# Patient Record
Sex: FEMALE | Race: WHITE | Employment: FULL TIME | URBAN - METROPOLITAN AREA
[De-identification: names, ages, dates, MRNs, and addresses within clinical notes are randomized per-mention and may not be internally consistent; named-entity substitution may affect disease eponyms.]

---

## 2017-04-26 PROBLEM — E66.01 MORBID OBESITY (HCC): Status: ACTIVE | Noted: 2017-04-26

## 2017-04-26 PROBLEM — M54.40 ACUTE LEFT-SIDED LOW BACK PAIN WITH SCIATICA: Status: ACTIVE | Noted: 2017-04-26

## 2017-04-27 PROBLEM — E55.9 VITAMIN D DEFICIENCY: Status: ACTIVE | Noted: 2017-04-27

## 2017-04-27 PROBLEM — E78.2 MIXED HYPERLIPIDEMIA: Status: ACTIVE | Noted: 2017-04-27

## 2017-08-29 PROBLEM — E16.1 HYPERINSULINEMIA: Status: ACTIVE | Noted: 2017-08-29

## 2018-03-22 ENCOUNTER — TELEPHONE (OUTPATIENT)
Dept: FAMILY MEDICINE CLINIC | Age: 38
End: 2018-03-22

## 2018-03-23 DIAGNOSIS — E16.1 HYPERINSULINEMIA: ICD-10-CM

## 2018-05-22 ENCOUNTER — OFFICE VISIT (OUTPATIENT)
Dept: FAMILY MEDICINE CLINIC | Age: 38
End: 2018-05-22
Payer: COMMERCIAL

## 2018-05-22 VITALS
TEMPERATURE: 98.5 F | HEART RATE: 68 BPM | HEIGHT: 72 IN | DIASTOLIC BLOOD PRESSURE: 84 MMHG | WEIGHT: 293 LBS | OXYGEN SATURATION: 99 % | SYSTOLIC BLOOD PRESSURE: 128 MMHG | BODY MASS INDEX: 39.68 KG/M2 | RESPIRATION RATE: 16 BRPM

## 2018-05-22 DIAGNOSIS — M79.89 LEG SWELLING: ICD-10-CM

## 2018-05-22 DIAGNOSIS — F32.A DEPRESSION, UNSPECIFIED DEPRESSION TYPE: Primary | ICD-10-CM

## 2018-05-22 DIAGNOSIS — M54.42 ACUTE LEFT-SIDED LOW BACK PAIN WITH LEFT-SIDED SCIATICA: ICD-10-CM

## 2018-05-22 DIAGNOSIS — F41.9 ANXIETY: ICD-10-CM

## 2018-05-22 PROCEDURE — G8427 DOCREV CUR MEDS BY ELIG CLIN: HCPCS | Performed by: NURSE PRACTITIONER

## 2018-05-22 PROCEDURE — 4004F PT TOBACCO SCREEN RCVD TLK: CPT | Performed by: NURSE PRACTITIONER

## 2018-05-22 PROCEDURE — 99213 OFFICE O/P EST LOW 20 MIN: CPT | Performed by: NURSE PRACTITIONER

## 2018-05-22 PROCEDURE — G8417 CALC BMI ABV UP PARAM F/U: HCPCS | Performed by: NURSE PRACTITIONER

## 2018-05-22 RX ORDER — BUSPIRONE HYDROCHLORIDE 5 MG/1
5 TABLET ORAL 3 TIMES DAILY PRN
Qty: 90 TABLET | Refills: 0 | Status: SHIPPED | OUTPATIENT
Start: 2018-05-22 | End: 2018-07-25 | Stop reason: SDUPTHER

## 2018-05-22 RX ORDER — FLUOXETINE HYDROCHLORIDE 20 MG/1
20 CAPSULE ORAL DAILY
COMMUNITY
End: 2018-05-22 | Stop reason: DRUGHIGH

## 2018-05-22 RX ORDER — FLUOXETINE HYDROCHLORIDE 40 MG/1
40 CAPSULE ORAL DAILY
Qty: 30 CAPSULE | Refills: 0 | Status: SHIPPED | OUTPATIENT
Start: 2018-05-22 | End: 2018-06-20 | Stop reason: SDUPTHER

## 2018-05-22 ASSESSMENT — ENCOUNTER SYMPTOMS
COUGH: 0
NAUSEA: 0
CONSTIPATION: 0
WHEEZING: 0
DIARRHEA: 0
VOMITING: 0
SHORTNESS OF BREATH: 0

## 2018-05-22 ASSESSMENT — PATIENT HEALTH QUESTIONNAIRE - PHQ9
SUM OF ALL RESPONSES TO PHQ QUESTIONS 1-9: 1
2. FEELING DOWN, DEPRESSED OR HOPELESS: 1
SUM OF ALL RESPONSES TO PHQ9 QUESTIONS 1 & 2: 1
1. LITTLE INTEREST OR PLEASURE IN DOING THINGS: 0

## 2018-07-25 DIAGNOSIS — F41.9 ANXIETY: ICD-10-CM

## 2018-07-26 RX ORDER — BUSPIRONE HYDROCHLORIDE 5 MG/1
TABLET ORAL
Qty: 90 TABLET | Refills: 0 | Status: SHIPPED | OUTPATIENT
Start: 2018-07-26 | End: 2018-09-06 | Stop reason: SDUPTHER

## 2018-09-06 DIAGNOSIS — F41.9 ANXIETY: ICD-10-CM

## 2018-09-07 RX ORDER — BUSPIRONE HYDROCHLORIDE 5 MG/1
TABLET ORAL
Qty: 90 TABLET | Refills: 0 | Status: SHIPPED | OUTPATIENT
Start: 2018-09-07 | End: 2018-11-14 | Stop reason: SDUPTHER

## 2018-11-06 DIAGNOSIS — F32.A DEPRESSION, UNSPECIFIED DEPRESSION TYPE: ICD-10-CM

## 2018-11-06 RX ORDER — FLUOXETINE HYDROCHLORIDE 40 MG/1
40 CAPSULE ORAL DAILY
Qty: 30 CAPSULE | Refills: 0 | Status: SHIPPED | OUTPATIENT
Start: 2018-11-06 | End: 2018-12-18 | Stop reason: SDUPTHER

## 2018-11-14 DIAGNOSIS — F41.9 ANXIETY: ICD-10-CM

## 2018-11-14 RX ORDER — BUSPIRONE HYDROCHLORIDE 5 MG/1
TABLET ORAL
Qty: 90 TABLET | Refills: 0 | Status: SHIPPED | OUTPATIENT
Start: 2018-11-14 | End: 2018-12-18 | Stop reason: SDUPTHER

## 2018-12-18 DIAGNOSIS — F41.9 ANXIETY: ICD-10-CM

## 2018-12-18 DIAGNOSIS — F32.A DEPRESSION, UNSPECIFIED DEPRESSION TYPE: ICD-10-CM

## 2018-12-18 RX ORDER — FLUOXETINE HYDROCHLORIDE 40 MG/1
40 CAPSULE ORAL DAILY
Qty: 30 CAPSULE | Refills: 0 | Status: SHIPPED | OUTPATIENT
Start: 2018-12-18 | End: 2019-01-16 | Stop reason: SDUPTHER

## 2018-12-18 RX ORDER — BUSPIRONE HYDROCHLORIDE 15 MG/1
TABLET ORAL
Qty: 30 TABLET | Refills: 0 | Status: SHIPPED | OUTPATIENT
Start: 2018-12-18 | End: 2019-01-14 | Stop reason: SDUPTHER

## 2018-12-19 ENCOUNTER — OFFICE VISIT (OUTPATIENT)
Dept: FAMILY MEDICINE CLINIC | Age: 38
End: 2018-12-19
Payer: COMMERCIAL

## 2018-12-19 VITALS
OXYGEN SATURATION: 99 % | HEART RATE: 81 BPM | BODY MASS INDEX: 39.68 KG/M2 | WEIGHT: 293 LBS | SYSTOLIC BLOOD PRESSURE: 126 MMHG | DIASTOLIC BLOOD PRESSURE: 74 MMHG | HEIGHT: 72 IN | RESPIRATION RATE: 16 BRPM | TEMPERATURE: 98 F

## 2018-12-19 DIAGNOSIS — B02.9 HERPES ZOSTER WITHOUT COMPLICATION: Primary | ICD-10-CM

## 2018-12-19 DIAGNOSIS — L30.9 ECZEMA, UNSPECIFIED TYPE: ICD-10-CM

## 2018-12-19 DIAGNOSIS — B36.9 FUNGAL DERMATITIS: ICD-10-CM

## 2018-12-19 PROCEDURE — G8484 FLU IMMUNIZE NO ADMIN: HCPCS | Performed by: NURSE PRACTITIONER

## 2018-12-19 PROCEDURE — G8417 CALC BMI ABV UP PARAM F/U: HCPCS | Performed by: NURSE PRACTITIONER

## 2018-12-19 PROCEDURE — 4004F PT TOBACCO SCREEN RCVD TLK: CPT | Performed by: NURSE PRACTITIONER

## 2018-12-19 PROCEDURE — G8427 DOCREV CUR MEDS BY ELIG CLIN: HCPCS | Performed by: NURSE PRACTITIONER

## 2018-12-19 PROCEDURE — 99214 OFFICE O/P EST MOD 30 MIN: CPT | Performed by: NURSE PRACTITIONER

## 2018-12-19 RX ORDER — METHYLPREDNISOLONE 4 MG/1
TABLET ORAL
Qty: 1 KIT | Refills: 0 | Status: SHIPPED | OUTPATIENT
Start: 2018-12-19 | End: 2019-04-23

## 2018-12-19 RX ORDER — KETOCONAZOLE 20 MG/G
CREAM TOPICAL
Qty: 30 G | Refills: 1 | Status: SHIPPED
Start: 2018-12-19 | End: 2020-03-10

## 2018-12-19 ASSESSMENT — ENCOUNTER SYMPTOMS
DIARRHEA: 0
SHORTNESS OF BREATH: 0
WHEEZING: 0
CONSTIPATION: 0
VOMITING: 0
NAUSEA: 0
COUGH: 0

## 2018-12-21 DIAGNOSIS — E16.1 HYPERINSULINEMIA: ICD-10-CM

## 2019-01-14 DIAGNOSIS — F41.9 ANXIETY: ICD-10-CM

## 2019-01-15 RX ORDER — BUSPIRONE HYDROCHLORIDE 15 MG/1
TABLET ORAL
Qty: 30 TABLET | Refills: 2 | Status: SHIPPED | OUTPATIENT
Start: 2019-01-15 | End: 2019-04-23 | Stop reason: SDUPTHER

## 2019-01-16 DIAGNOSIS — F32.A DEPRESSION, UNSPECIFIED DEPRESSION TYPE: ICD-10-CM

## 2019-01-17 RX ORDER — FLUOXETINE HYDROCHLORIDE 40 MG/1
40 CAPSULE ORAL DAILY
Qty: 30 CAPSULE | Refills: 0 | Status: SHIPPED | OUTPATIENT
Start: 2019-01-17 | End: 2019-02-19 | Stop reason: SDUPTHER

## 2019-02-06 ENCOUNTER — TELEPHONE (OUTPATIENT)
Dept: FAMILY MEDICINE CLINIC | Age: 39
End: 2019-02-06

## 2019-02-06 RX ORDER — FLUCONAZOLE 150 MG/1
150 TABLET ORAL ONCE
Qty: 1 TABLET | Refills: 1 | Status: SHIPPED | OUTPATIENT
Start: 2019-02-06 | End: 2019-02-06

## 2019-02-19 DIAGNOSIS — F32.A DEPRESSION, UNSPECIFIED DEPRESSION TYPE: ICD-10-CM

## 2019-02-19 RX ORDER — FLUOXETINE HYDROCHLORIDE 40 MG/1
40 CAPSULE ORAL DAILY
Qty: 30 CAPSULE | Refills: 2 | Status: SHIPPED | OUTPATIENT
Start: 2019-02-19 | End: 2019-07-05 | Stop reason: SDUPTHER

## 2019-04-23 ENCOUNTER — OFFICE VISIT (OUTPATIENT)
Dept: FAMILY MEDICINE CLINIC | Age: 39
End: 2019-04-23
Payer: COMMERCIAL

## 2019-04-23 VITALS
HEIGHT: 72 IN | HEART RATE: 71 BPM | SYSTOLIC BLOOD PRESSURE: 134 MMHG | DIASTOLIC BLOOD PRESSURE: 82 MMHG | TEMPERATURE: 97.9 F | OXYGEN SATURATION: 99 % | RESPIRATION RATE: 16 BRPM | WEIGHT: 293 LBS | BODY MASS INDEX: 39.68 KG/M2

## 2019-04-23 DIAGNOSIS — B02.9 HERPES ZOSTER WITHOUT COMPLICATION: Primary | ICD-10-CM

## 2019-04-23 DIAGNOSIS — F41.9 ANXIETY: ICD-10-CM

## 2019-04-23 PROCEDURE — 99213 OFFICE O/P EST LOW 20 MIN: CPT | Performed by: FAMILY MEDICINE

## 2019-04-23 PROCEDURE — G8417 CALC BMI ABV UP PARAM F/U: HCPCS | Performed by: FAMILY MEDICINE

## 2019-04-23 PROCEDURE — G8427 DOCREV CUR MEDS BY ELIG CLIN: HCPCS | Performed by: FAMILY MEDICINE

## 2019-04-23 PROCEDURE — 4004F PT TOBACCO SCREEN RCVD TLK: CPT | Performed by: FAMILY MEDICINE

## 2019-04-23 RX ORDER — PREDNISONE 5 MG/1
TABLET ORAL
Qty: 21 EACH | Refills: 0 | Status: SHIPPED | OUTPATIENT
Start: 2019-04-23 | End: 2019-05-14

## 2019-04-23 RX ORDER — BUSPIRONE HYDROCHLORIDE 15 MG/1
TABLET ORAL
Qty: 30 TABLET | Refills: 2 | Status: SHIPPED | OUTPATIENT
Start: 2019-04-23 | End: 2019-08-02 | Stop reason: SDUPTHER

## 2019-04-23 ASSESSMENT — ENCOUNTER SYMPTOMS
APNEA: 0
CHEST TIGHTNESS: 0
EYE ITCHING: 0
BACK PAIN: 0
EYE DISCHARGE: 0
EYE PAIN: 0

## 2019-04-23 ASSESSMENT — PATIENT HEALTH QUESTIONNAIRE - PHQ9
SUM OF ALL RESPONSES TO PHQ QUESTIONS 1-9: 0
2. FEELING DOWN, DEPRESSED OR HOPELESS: 0
1. LITTLE INTEREST OR PLEASURE IN DOING THINGS: 0
SUM OF ALL RESPONSES TO PHQ9 QUESTIONS 1 & 2: 0
SUM OF ALL RESPONSES TO PHQ QUESTIONS 1-9: 0

## 2019-05-14 ENCOUNTER — HOSPITAL ENCOUNTER (OUTPATIENT)
Age: 39
Discharge: HOME | End: 2019-05-16
Payer: COMMERCIAL

## 2019-05-14 ENCOUNTER — OFFICE VISIT (OUTPATIENT)
Dept: FAMILY MEDICINE CLINIC | Age: 39
End: 2019-05-14
Payer: COMMERCIAL

## 2019-05-14 VITALS
HEIGHT: 72 IN | HEART RATE: 68 BPM | BODY MASS INDEX: 39.68 KG/M2 | TEMPERATURE: 97.8 F | SYSTOLIC BLOOD PRESSURE: 138 MMHG | DIASTOLIC BLOOD PRESSURE: 82 MMHG | WEIGHT: 293 LBS | RESPIRATION RATE: 14 BRPM

## 2019-05-14 DIAGNOSIS — R79.89 ABNORMAL THYROID STIMULATING HORMONE LEVEL: Primary | ICD-10-CM

## 2019-05-14 DIAGNOSIS — R79.89 ABNORMAL THYROID STIMULATING HORMONE LEVEL: ICD-10-CM

## 2019-05-14 LAB
T4 FREE: 1.12 NG/DL (ref 0.93–1.7)
TSH SERPL DL<=0.05 MIU/L-ACNC: 4.23 UIU/ML (ref 0.27–4.2)

## 2019-05-14 PROCEDURE — 4004F PT TOBACCO SCREEN RCVD TLK: CPT | Performed by: NURSE PRACTITIONER

## 2019-05-14 PROCEDURE — 84443 ASSAY THYROID STIM HORMONE: CPT

## 2019-05-14 PROCEDURE — 86800 THYROGLOBULIN ANTIBODY: CPT

## 2019-05-14 PROCEDURE — G8417 CALC BMI ABV UP PARAM F/U: HCPCS | Performed by: NURSE PRACTITIONER

## 2019-05-14 PROCEDURE — 99213 OFFICE O/P EST LOW 20 MIN: CPT | Performed by: NURSE PRACTITIONER

## 2019-05-14 PROCEDURE — 86376 MICROSOMAL ANTIBODY EACH: CPT

## 2019-05-14 PROCEDURE — 84439 ASSAY OF FREE THYROXINE: CPT

## 2019-05-14 PROCEDURE — G8427 DOCREV CUR MEDS BY ELIG CLIN: HCPCS | Performed by: NURSE PRACTITIONER

## 2019-05-14 ASSESSMENT — ENCOUNTER SYMPTOMS
NAUSEA: 1
SHORTNESS OF BREATH: 0
WHEEZING: 0
COUGH: 0
DIARRHEA: 0
VOMITING: 0
CONSTIPATION: 0

## 2019-05-14 NOTE — PROGRESS NOTES
HPI:  Patient comes intoday for   Chief Complaint   Patient presents with    Results     here to go over thyroid labs   . TSH was 6.2 on labs checked by GYN at [facility].  Results are scanned under media    Prior to Visit Medications    Medication Sig Taking? Authorizing Provider   busPIRone (BUSPAR) 15 MG tablet TAKE 1/3 TABLET BY MOUTH THREE TIMES DAILY AS NEEDED FOR ANXIETY Yes Pito Avila,    FLUoxetine (PROZAC) 40 MG capsule TAKE 1 CAPSULE BY MOUTH DAILY Yes MARY Shukla CNP   metFORMIN (GLUCOPHAGE) 500 MG tablet TAKE 1 TABLET BY MOUTH DAILY WITH BREAKFAST Yes MARY Shukla CNP   ketoconazole (NIZORAL) 2 % cream Apply topically daily. Yes MARY Shukla CNP   Crisaborole (EUCRISA) 2 % OINT Apply 1 applicator topically 2 times daily Yes MARY Shukla CNP   Cholecalciferol (VITAMIN D3) 5000 units CAPS TAKE 1 CAPSULE BY MOUTH DAILY Yes MARY Shukla CNP   fluticasone (FLONASE) 50 MCG/ACT nasal spray 2 sprays by Nasal route daily Yes Pankaj Jackson   cetirizine (ZYRTEC ALLERGY) 10 MG tablet Take 1 tablet by mouth daily Yes MARY Shukla CNP   aspirin 81 MG tablet Take 81 mg by mouth daily Yes Historical Provider, MD         No Known Allergies      Review of Systems  Review of Systems   Constitutional: Positive for fatigue. Negative for activity change, appetite change and unexpected weight change (fluctuates). Respiratory: Negative for cough, shortness of breath and wheezing. Cardiovascular: Positive for palpitations. Negative for chest pain. Gastrointestinal: Positive for nausea (intermittently in the mornings). Negative for constipation, diarrhea and vomiting. Genitourinary: Positive for menstrual problem (break through bleeding). Neurological: Negative for weakness, light-headedness and headaches. Psychiatric/Behavioral: Positive for dysphoric mood. The patient is nervous/anxious.           VS:  /82   Pulse 68   Temp 97.8 °F

## 2019-05-16 LAB
THYROGLOBULIN AB: <0.9 IU/ML (ref 0–4)
THYROID PEROXIDASE (TPO) ABS: 15.7 IU/ML (ref 0–9)

## 2019-05-17 DIAGNOSIS — E06.3 HYPOTHYROIDISM DUE TO HASHIMOTO'S THYROIDITIS: Primary | ICD-10-CM

## 2019-05-17 DIAGNOSIS — E03.8 HYPOTHYROIDISM DUE TO HASHIMOTO'S THYROIDITIS: Primary | ICD-10-CM

## 2019-05-17 RX ORDER — LEVOTHYROXINE SODIUM 0.03 MG/1
25 TABLET ORAL DAILY
Qty: 90 TABLET | Refills: 1 | Status: SHIPPED | OUTPATIENT
Start: 2019-05-17 | End: 2019-12-19

## 2019-07-05 DIAGNOSIS — F32.A DEPRESSION, UNSPECIFIED DEPRESSION TYPE: ICD-10-CM

## 2019-07-06 RX ORDER — FLUOXETINE HYDROCHLORIDE 40 MG/1
40 CAPSULE ORAL DAILY
Qty: 30 CAPSULE | Refills: 0 | Status: SHIPPED | OUTPATIENT
Start: 2019-07-06 | End: 2019-08-02 | Stop reason: SDUPTHER

## 2019-08-02 DIAGNOSIS — F41.9 ANXIETY: ICD-10-CM

## 2019-08-02 DIAGNOSIS — F32.A DEPRESSION, UNSPECIFIED DEPRESSION TYPE: ICD-10-CM

## 2019-08-03 RX ORDER — FLUOXETINE HYDROCHLORIDE 40 MG/1
40 CAPSULE ORAL DAILY
Qty: 30 CAPSULE | Refills: 0 | Status: SHIPPED | OUTPATIENT
Start: 2019-08-03 | End: 2019-09-11 | Stop reason: SDUPTHER

## 2019-08-03 RX ORDER — BUSPIRONE HYDROCHLORIDE 15 MG/1
TABLET ORAL
Qty: 30 TABLET | Refills: 0 | Status: SHIPPED | OUTPATIENT
Start: 2019-08-03 | End: 2019-12-06 | Stop reason: SDUPTHER

## 2019-08-29 ENCOUNTER — TELEPHONE (OUTPATIENT)
Dept: FAMILY MEDICINE CLINIC | Age: 39
End: 2019-08-29

## 2019-09-11 DIAGNOSIS — F32.A DEPRESSION, UNSPECIFIED DEPRESSION TYPE: ICD-10-CM

## 2019-09-11 RX ORDER — FLUOXETINE HYDROCHLORIDE 40 MG/1
40 CAPSULE ORAL DAILY
Qty: 30 CAPSULE | Refills: 0 | Status: SHIPPED | OUTPATIENT
Start: 2019-09-11 | End: 2019-10-11 | Stop reason: SDUPTHER

## 2019-09-17 ENCOUNTER — TELEPHONE (OUTPATIENT)
Dept: FAMILY MEDICINE CLINIC | Age: 39
End: 2019-09-17

## 2019-09-24 ENCOUNTER — HOSPITAL ENCOUNTER (OUTPATIENT)
Age: 39
Discharge: HOME | End: 2019-09-24
Payer: COMMERCIAL

## 2019-09-24 DIAGNOSIS — E03.8 HYPOTHYROIDISM DUE TO HASHIMOTO'S THYROIDITIS: ICD-10-CM

## 2019-09-24 DIAGNOSIS — E06.3 HYPOTHYROIDISM DUE TO HASHIMOTO'S THYROIDITIS: ICD-10-CM

## 2019-09-24 LAB
T4 FREE: 1.14 NG/DL (ref 0.93–1.7)
TSH SERPL DL<=0.05 MIU/L-ACNC: 2.64 UIU/ML (ref 0.27–4.2)

## 2019-09-24 PROCEDURE — 84439 ASSAY OF FREE THYROXINE: CPT

## 2019-09-24 PROCEDURE — 84443 ASSAY THYROID STIM HORMONE: CPT

## 2019-09-24 PROCEDURE — 36415 COLL VENOUS BLD VENIPUNCTURE: CPT

## 2019-10-11 DIAGNOSIS — F32.A DEPRESSION, UNSPECIFIED DEPRESSION TYPE: ICD-10-CM

## 2019-10-14 RX ORDER — FLUOXETINE HYDROCHLORIDE 40 MG/1
40 CAPSULE ORAL DAILY
Qty: 30 CAPSULE | Refills: 0 | Status: SHIPPED | OUTPATIENT
Start: 2019-10-14 | End: 2019-11-12 | Stop reason: SDUPTHER

## 2019-11-12 DIAGNOSIS — F32.A DEPRESSION, UNSPECIFIED DEPRESSION TYPE: ICD-10-CM

## 2019-11-12 RX ORDER — FLUOXETINE HYDROCHLORIDE 40 MG/1
40 CAPSULE ORAL DAILY
Qty: 30 CAPSULE | Refills: 0 | Status: SHIPPED | OUTPATIENT
Start: 2019-11-12 | End: 2020-01-05 | Stop reason: DRUGHIGH

## 2019-12-06 DIAGNOSIS — F41.9 ANXIETY: ICD-10-CM

## 2019-12-06 RX ORDER — BUSPIRONE HYDROCHLORIDE 15 MG/1
15 TABLET ORAL DAILY
Qty: 14 TABLET | Refills: 0 | Status: SHIPPED
Start: 2019-12-06 | End: 2020-09-11 | Stop reason: SDUPTHER

## 2019-12-18 DIAGNOSIS — E06.3 HYPOTHYROIDISM DUE TO HASHIMOTO'S THYROIDITIS: ICD-10-CM

## 2019-12-18 DIAGNOSIS — E03.8 HYPOTHYROIDISM DUE TO HASHIMOTO'S THYROIDITIS: ICD-10-CM

## 2019-12-19 RX ORDER — LEVOTHYROXINE SODIUM 0.03 MG/1
TABLET ORAL
Qty: 14 TABLET | Refills: 0 | Status: SHIPPED | OUTPATIENT
Start: 2019-12-19 | End: 2019-12-30 | Stop reason: DRUGHIGH

## 2019-12-30 ENCOUNTER — TELEPHONE (OUTPATIENT)
Dept: FAMILY MEDICINE CLINIC | Age: 39
End: 2019-12-30

## 2019-12-30 RX ORDER — LEVOTHYROXINE SODIUM 0.05 MG/1
50 TABLET ORAL DAILY
Qty: 30 TABLET | Refills: 0 | Status: SHIPPED | OUTPATIENT
Start: 2019-12-30 | End: 2020-01-27

## 2020-01-05 RX ORDER — FLUOXETINE HYDROCHLORIDE 20 MG/1
20 CAPSULE ORAL DAILY
Qty: 30 CAPSULE | Refills: 0 | Status: SHIPPED
Start: 2020-01-05 | End: 2020-02-06 | Stop reason: SDUPTHER

## 2020-01-08 LAB — TSH SERPL DL<=0.05 MIU/L-ACNC: 4.32 UIU/ML

## 2020-01-27 RX ORDER — LEVOTHYROXINE SODIUM 0.05 MG/1
50 TABLET ORAL DAILY
Qty: 30 TABLET | Refills: 0 | Status: SHIPPED
Start: 2020-01-27 | End: 2020-02-06 | Stop reason: SDUPTHER

## 2020-02-06 RX ORDER — LEVOTHYROXINE SODIUM 0.05 MG/1
50 TABLET ORAL DAILY
Qty: 30 TABLET | Refills: 0 | Status: SHIPPED
Start: 2020-02-06 | End: 2020-02-25 | Stop reason: SDUPTHER

## 2020-02-06 RX ORDER — FLUOXETINE HYDROCHLORIDE 20 MG/1
20 CAPSULE ORAL DAILY
Qty: 30 CAPSULE | Refills: 0 | Status: SHIPPED
Start: 2020-02-06 | End: 2020-03-10

## 2020-02-08 ENCOUNTER — HOSPITAL ENCOUNTER (OUTPATIENT)
Dept: ULTRASOUND IMAGING | Age: 40
Discharge: HOME | End: 2020-02-08
Payer: COMMERCIAL

## 2020-02-08 ENCOUNTER — HOSPITAL ENCOUNTER (OUTPATIENT)
Dept: ULTRASOUND IMAGING | Age: 40
End: 2020-02-08
Payer: COMMERCIAL

## 2020-02-08 PROCEDURE — 76770 US EXAM ABDO BACK WALL COMP: CPT

## 2020-02-25 RX ORDER — LEVOTHYROXINE SODIUM 0.05 MG/1
50 TABLET ORAL DAILY
Qty: 10 TABLET | Refills: 0 | Status: SHIPPED
Start: 2020-02-25 | End: 2020-03-10 | Stop reason: SDUPTHER

## 2020-03-10 ENCOUNTER — OFFICE VISIT (OUTPATIENT)
Dept: FAMILY MEDICINE CLINIC | Age: 40
End: 2020-03-10
Payer: COMMERCIAL

## 2020-03-10 ENCOUNTER — HOSPITAL ENCOUNTER (OUTPATIENT)
Age: 40
Discharge: HOME | End: 2020-03-12
Payer: COMMERCIAL

## 2020-03-10 VITALS
HEIGHT: 72 IN | RESPIRATION RATE: 14 BRPM | HEART RATE: 88 BPM | TEMPERATURE: 98 F | OXYGEN SATURATION: 95 % | BODY MASS INDEX: 39.68 KG/M2 | WEIGHT: 293 LBS | DIASTOLIC BLOOD PRESSURE: 92 MMHG | SYSTOLIC BLOOD PRESSURE: 134 MMHG

## 2020-03-10 PROCEDURE — 4004F PT TOBACCO SCREEN RCVD TLK: CPT | Performed by: NURSE PRACTITIONER

## 2020-03-10 PROCEDURE — 99214 OFFICE O/P EST MOD 30 MIN: CPT | Performed by: NURSE PRACTITIONER

## 2020-03-10 PROCEDURE — G8427 DOCREV CUR MEDS BY ELIG CLIN: HCPCS | Performed by: NURSE PRACTITIONER

## 2020-03-10 PROCEDURE — 84439 ASSAY OF FREE THYROXINE: CPT

## 2020-03-10 PROCEDURE — G8484 FLU IMMUNIZE NO ADMIN: HCPCS | Performed by: NURSE PRACTITIONER

## 2020-03-10 PROCEDURE — G8417 CALC BMI ABV UP PARAM F/U: HCPCS | Performed by: NURSE PRACTITIONER

## 2020-03-10 PROCEDURE — 84443 ASSAY THYROID STIM HORMONE: CPT

## 2020-03-10 RX ORDER — LEVOTHYROXINE SODIUM 0.05 MG/1
50 TABLET ORAL DAILY
Qty: 30 TABLET | Refills: 2 | Status: SHIPPED
Start: 2020-03-10 | End: 2020-07-07

## 2020-03-10 RX ORDER — FLUOXETINE 10 MG/1
10 CAPSULE ORAL DAILY
Qty: 21 CAPSULE | Refills: 0 | Status: SHIPPED
Start: 2020-03-10 | End: 2020-06-10

## 2020-03-10 RX ORDER — CRISABOROLE 20 MG/G
1 OINTMENT TOPICAL 2 TIMES DAILY
Qty: 1 TUBE | Refills: 0 | Status: SHIPPED
Start: 2020-03-10 | End: 2022-04-19 | Stop reason: ALTCHOICE

## 2020-03-10 ASSESSMENT — PATIENT HEALTH QUESTIONNAIRE - PHQ9
SUM OF ALL RESPONSES TO PHQ9 QUESTIONS 1 & 2: 1
1. LITTLE INTEREST OR PLEASURE IN DOING THINGS: 1
SUM OF ALL RESPONSES TO PHQ QUESTIONS 1-9: 1
2. FEELING DOWN, DEPRESSED OR HOPELESS: 0
SUM OF ALL RESPONSES TO PHQ QUESTIONS 1-9: 1

## 2020-03-10 ASSESSMENT — ENCOUNTER SYMPTOMS
VOMITING: 0
NAUSEA: 0
COUGH: 0
WHEEZING: 0
CONSTIPATION: 0
SHORTNESS OF BREATH: 1
DIARRHEA: 0

## 2020-03-10 NOTE — PROGRESS NOTES
Head: Normocephalic and atraumatic. Neck:      Thyroid: No thyromegaly. Trachea: No tracheal deviation. Cardiovascular:      Rate and Rhythm: Normal rate and regular rhythm. Heart sounds: No murmur. Pulmonary:      Effort: Pulmonary effort is normal.      Breath sounds: Normal breath sounds. No wheezing or rales. Chest:      Chest wall: No tenderness. Abdominal:      General: Bowel sounds are normal.      Palpations: Abdomen is soft. Tenderness: There is no abdominal tenderness. Lymphadenopathy:      Cervical: No cervical adenopathy. Skin:     General: Skin is warm and dry. Findings: Rash (erythematous maculopapular rash to face) present. Neurological:      Mental Status: She is alert and oriented to person, place, and time. Psychiatric:         Behavior: Behavior normal.           Assessment/Plan:  Jimi was seen today for thyroid problem and hypertension. Diagnoses and all orders for this visit:    Hypothyroidism due to Hashimoto's thyroiditis  -     levothyroxine (SYNTHROID) 50 MCG tablet; Take 1 tablet by mouth daily  -     TSH without Reflex; Future  -     T4, Free; Future    Eczema, unspecified type  -     Crisaborole (EUCRISA) 2 % OINT; Apply 1 applicator topically 2 times daily  -The current medical regimen is effective;  continue present plan and medications. Anxiety  -     FLUoxetine (PROZAC) 10 MG capsule; Take 1 capsule by mouth daily  -will wean to one capsule daily for 2 weeks, then one every other day for one week, then D/C  -contact office with any difficulty weaning    Depression, unspecified depression type  -     FLUoxetine (PROZAC) 10 MG capsule;  Take 1 capsule by mouth daily  -as above    Pregnant state, incidental  -continue prenatal care, monitor BP and thyroid levels closely        Greater than 25  Minutes was spent with patient and more than 50% of the time was spent face to facecounseling and educating regarding diagnoses

## 2020-03-11 LAB
T4 FREE: 1.17 NG/DL (ref 0.93–1.7)
TSH SERPL DL<=0.05 MIU/L-ACNC: 3.33 UIU/ML (ref 0.27–4.2)

## 2020-04-10 ENCOUNTER — TELEPHONE (OUTPATIENT)
Dept: OBGYN CLINIC | Age: 40
End: 2020-04-10

## 2020-04-10 ENCOUNTER — INITIAL PRENATAL (OUTPATIENT)
Dept: OBGYN CLINIC | Age: 40
End: 2020-04-10
Payer: COMMERCIAL

## 2020-04-10 VITALS
SYSTOLIC BLOOD PRESSURE: 103 MMHG | DIASTOLIC BLOOD PRESSURE: 66 MMHG | WEIGHT: 293 LBS | BODY MASS INDEX: 52.66 KG/M2 | TEMPERATURE: 98.2 F | HEART RATE: 82 BPM

## 2020-04-10 PROBLEM — O09.529 AMA (ADVANCED MATERNAL AGE) MULTIGRAVIDA 35+: Status: ACTIVE | Noted: 2020-04-10

## 2020-04-10 PROBLEM — Z95.828 STATUS POST INSERTION OF ILIAC ARTERY STENT: Status: ACTIVE | Noted: 2020-04-10

## 2020-04-10 PROBLEM — Z3A.21 21 WEEKS GESTATION OF PREGNANCY: Status: ACTIVE | Noted: 2020-04-10

## 2020-04-10 PROBLEM — O99.280 HYPOTHYROID IN PREGNANCY, ANTEPARTUM: Status: ACTIVE | Noted: 2020-04-10

## 2020-04-10 PROBLEM — O99.210 OBESITY AFFECTING PREGNANCY: Status: ACTIVE | Noted: 2020-04-10

## 2020-04-10 PROBLEM — O16.9 HYPERTENSION AFFECTING PREGNANCY, ANTEPARTUM: Status: ACTIVE | Noted: 2020-04-10

## 2020-04-10 PROBLEM — F17.200 SMOKING: Status: ACTIVE | Noted: 2020-04-10

## 2020-04-10 PROBLEM — D72.829 ELEVATED WHITE BLOOD CELL COUNT: Status: ACTIVE | Noted: 2020-04-10

## 2020-04-10 PROBLEM — O12.10 PROTEINURIA AFFECTING PREGNANCY: Status: ACTIVE | Noted: 2020-04-10

## 2020-04-10 PROBLEM — E66.01 MORBID OBESITY (HCC): Status: RESOLVED | Noted: 2017-04-26 | Resolved: 2020-04-10

## 2020-04-10 PROBLEM — E03.9 HYPOTHYROID IN PREGNANCY, ANTEPARTUM: Status: ACTIVE | Noted: 2020-04-10

## 2020-04-10 PROCEDURE — 76805 OB US >/= 14 WKS SNGL FETUS: CPT | Performed by: OBSTETRICS & GYNECOLOGY

## 2020-04-10 PROCEDURE — 99205 OFFICE O/P NEW HI 60 MIN: CPT | Performed by: OBSTETRICS & GYNECOLOGY

## 2020-04-10 PROCEDURE — 99203 OFFICE O/P NEW LOW 30 MIN: CPT

## 2020-04-10 RX ORDER — LABETALOL 100 MG/1
100 TABLET, FILM COATED ORAL DAILY
COMMUNITY
End: 2021-03-25 | Stop reason: SDUPTHER

## 2020-04-10 NOTE — PATIENT INSTRUCTIONS
Version: 12.4  © 4894-5366 Healthwise, Incorporated. Care instructions adapted under license by [facility] ([facility]). If you have questions about a medical condition or this instruction, always ask your healthcare professional. [address] any warranty or liability for your use of this information.

## 2020-04-10 NOTE — LETTER
 Proteinuria affecting pregnancy 04/10/2020     Overview Note:     24 hour urine protein performed- 474 mg. She has recurrent UTI- normal renal ultrasound  Cr 1.14  The increased risks were discussed including worsening renal function, need for dialysis, hypertensive disorders. Nephrology consultation is recommended.  Elevated white blood cell count 04/10/2020     Overview Note:     Patient had recent elevated WBC count of 17.1. She reports that this was  Not during UTI or other infection. Dr. Jason Méndez has referred to Hematology. Await their recommendations.  Hypertension affecting pregnancy, antepartum 04/10/2020     Overview Note:     I discussed with the patient concerns regarding hypertension in pregnancy and the increased risk of adverse pregnancy outcomes including but not limited to fetal growth restriction, placental abruption, prematurity and increased risk of stillbirth. I discussed with her that there is also an increased risk of developing superimposed preeclampsia. I also discussed with the patient the goals of hypertension control during pregnancy and medications that can be safely used during pregnancy. We recommend increased monitoring with more frequent visits, serial ultrasounds to monitor growth and  testing starting no later than 32 weeks gestation. I also recommended that she have baseline urine protein creatinine ratio to evaluate her renal status as well as CBC and chemistry panel. These labs should be repeated each trimester or more frequently as clinically indicated. She should also have an baseline EKG. If there are abnormalities in the EKG she may need to have an echo of her heart.  In addition, the [place] International Paper (2014) has recommended that low-dose aspirin 81 mg be initiated between 12 and 28 weeks of gestation to reduce the occurrence of preeclampsia,  birth, and fetal growth restriction in women at increased risk for

## 2020-04-10 NOTE — PROGRESS NOTES
Hyperinsulemia was elevated few years ago, was on metformin, currently HbgA1C  All normal  Compressed iliac veins, currently stents bilateral, swell always, worst currently  Denies headache or blurred vision  Denies bleeding, cramping or leakage of fluid  States baby is moving, little, \"sleeps a lot\",m should notice movements do start to increase, moves more at night  Ketone=negative  Call your obstetrician for:    Bleeding, leakage of fluid, abdominal tenderness, headaches, burred vision or  epigastric pain or decreased fetal movement or increased in urinary frequency.    Call if any questions or concerns  Prior to discharge from office patient given information as to how to give lovenox sub-cutaneously  Patient placed on medication at today's vivit

## 2020-04-13 NOTE — PROGRESS NOTES
OINT Apply 1 applicator topically 2 times daily 1 Tube 0    FLUoxetine (PROZAC) 10 MG capsule Take 1 capsule by mouth daily 21 capsule 0    busPIRone (BUSPAR) 15 MG tablet Take 15 mg by mouth daily 14 tablet 0    aspirin 81 MG tablet Take 81 mg by mouth daily         ALLERGY:   No Known Allergies    REVIEW OF SYSTEMS:     Review of Systems   All other systems reviewed and are negative. PHYSICAL EXAM:  VITAL SIGNS: /66   Pulse 82   Temp 98.2 °F (36.8 °C) (Oral)   Wt (!) 388 lb 4 oz (176.1 kg)   BMI 52.66 kg/m²     Physical Exam  Constitutional:       Appearance: She is obese. Musculoskeletal:         General: No tenderness. Right lower leg: Edema present. Left lower leg: Edema present. Vitals signs and nursing note reviewed. IMAGING:  Please see ultrasound report for full details. LABS:    Hospital Outpatient Visit on 03/10/2020   Component Date Value Ref Range Status    T4 Free 03/10/2020 1.17  0.93 - 1.70 ng/dL Final    TSH 03/10/2020 3.330  0.270 - 4.200 uIU/mL Final   Abstract on 03/10/2020   Component Date Value Ref Range Status    TSH 2020 4.320  uIU/mL Final   Hospital Outpatient Visit on 2019   Component Date Value Ref Range Status    T4 Free 2019 1.14  0.93 - 1.70 ng/dL Final    TSH 2019 2.640  0.270 - 4.200 uIU/mL Final   Hospital Outpatient Visit on 2019   Component Date Value Ref Range Status    T4 Free 2019 1.12  0.93 - 1.70 ng/dL Final    TSH 2019 4.230* 0.270 - 4.200 uIU/mL Final    THYROID PEROXIDASE (TPO) ABS 2019 15.7* 0.0 - 9.0 IU/mL Final    Thyroglobulin Ab 2019 <0.9  0.0 - 4.0 IU/mL Final         IMPRESSION:   Alek Jacobson is a 42 y.o. female  with the following concerns:  .      RECOMMENDATIONS:  Patient Active Problem List    Diagnosis Date Noted    21 weeks gestation of pregnancy 04/10/2020    AMA (advanced maternal age) multigravida 35+ 04/10/2020     Overview Note:     At this visit, we discussed that there is an approximately 3% background chance of having a child with a major birth defect, intellectual disability, or genetic disorder. The increasing chance of numerical chromosomal abnormalities with increasing maternal age was reviewed. Based on her age at delivery, there is an approximately 1% chance for this pregnancy to have a numerical chromosome abnormality. Low risk cell free DNA aneuploidy screening. Declined invasive testing  Genetic counseling is available should she desire.  Obesity affecting pregnancy 04/10/2020     Overview Note:       We reviewed that obesity complicating pregnancy is associated with increased maternal and fetal risks including: gestational diabetes, hypertensive disorders, iatrogenic  delivery, dysfunctional labor, postterm pregnancy, large for gestational age infant, shoulder dystocia, obstructive sleep apnea, postpartum hemorrhage, stillbirth, fetal anomalies,  delivery and postcesarean complications. RECOMMENDATIONS:   Dating and viability ultrasound to establish gestational age, level 2 anatomy ultrasound around 20 weeks, and serial growth every 4 weeks starting at 23 weeks   First trimester screening for diabetes with a 1 hour GCT. If < 140 mg/dL, repeat glucola screening at 24-28 weeks   Nutrition consultation is recommended.  Reviewed diet, exercise, and weight gain. [facility] of medicine recommend weight gain in pregnancy: Obese (all classes) > 30:  total weight gain 11-20 lbs, 0.5 lb per week in the second and third trimester.  Low dose aspirin is recommended.     Anesthesia consultation   Plan for adequate equipment for delivery that can accommodate maternal weight    After  delivery, provide adequate thromboprophylaxis    If  delivery, surgical technique needs to be modified for adequate exposure and postpartum care should be modified to reduce the risk of obesity-associated 04/10/2020     Overview Note:     I discussed with the patient concerns regarding hypertension in pregnancy and the increased risk of adverse pregnancy outcomes including but not limited to fetal growth restriction, placental abruption, prematurity and increased risk of stillbirth. I discussed with her that there is also an increased risk of developing superimposed preeclampsia. I also discussed with the patient the goals of hypertension control during pregnancy and medications that can be safely used during pregnancy. We recommend increased monitoring with more frequent visits, serial ultrasounds to monitor growth and  testing starting no later than 32 weeks gestation. I also recommended that she have baseline urine protein creatinine ratio to evaluate her renal status as well as CBC and chemistry panel. These labs should be repeated each trimester or more frequently as clinically indicated. She should also have an baseline EKG. If there are abnormalities in the EKG she may need to have an echo of her heart. In addition, the [place] International Paper (2014) has recommended that low-dose aspirin 81 mg be initiated between 12 and 28 weeks of gestation to reduce the occurrence of preeclampsia,  birth, and fetal growth restriction in women at increased risk for preeclampsia. A thorough discussion of risk benefits and alternatives of management was performed with the patient. She stated understanding. BP normal today. Continue labetalol 100 mg daily      Hypothyroid in pregnancy, antepartum 04/10/2020     Overview Note:     I discussed with the patient that poorly controlled hypothyroidism can lead to increased risk for  morbidity and mortality. There can be increased risk for the development of gestational hypertension and preeclampsia. There can be increased risk for placental abruption, fetal distress, low birth weight, and postpartum hemorrhage.   Poorly controlled hypothyroidism can also lead to neurologic and cognitive impairment in the baby. There is also risk of fetal hypothyroidism with resulting fetal goiter. Treatment goals are to adjust thyroid replacement to maintain the maternal serum TSH within the trimester-specific reference ranges. Anatomical survey with MFM is recommended. In addition,  I would recommend serial ultrasounds to monitor for growth restriction.  surveillance would be as clinically indicated. Continue Synthroid 50 mcg daily.  Smoking 04/10/2020     Overview Note:     Strongly recommended smoking cessation. Discussed risks including death.  Hyperinsulinemia 2017    Vitamin D deficiency 2017    Mixed hyperlipidemia 2017    Acute left-sided low back pain with sciatica 2017            The total patient time of the visit was 60 minutes, of which was greater than 50% of the time was spent counseling and coordinating care.

## 2020-05-11 ENCOUNTER — ROUTINE PRENATAL (OUTPATIENT)
Dept: OBGYN CLINIC | Age: 40
End: 2020-05-11
Payer: COMMERCIAL

## 2020-05-11 VITALS
HEART RATE: 86 BPM | SYSTOLIC BLOOD PRESSURE: 118 MMHG | WEIGHT: 293 LBS | TEMPERATURE: 98.2 F | DIASTOLIC BLOOD PRESSURE: 74 MMHG | BODY MASS INDEX: 54.71 KG/M2

## 2020-05-11 PROBLEM — Z3A.26 26 WEEKS GESTATION OF PREGNANCY: Status: ACTIVE | Noted: 2020-05-11

## 2020-05-11 PROBLEM — Z3A.21 21 WEEKS GESTATION OF PREGNANCY: Status: RESOLVED | Noted: 2020-04-10 | Resolved: 2020-05-11

## 2020-05-11 LAB
GLUCOSE URINE, POC: NEGATIVE
PROTEIN UA: NEGATIVE

## 2020-05-11 PROCEDURE — 99213 OFFICE O/P EST LOW 20 MIN: CPT | Performed by: OBSTETRICS & GYNECOLOGY

## 2020-05-11 PROCEDURE — 81002 URINALYSIS NONAUTO W/O SCOPE: CPT | Performed by: OBSTETRICS & GYNECOLOGY

## 2020-05-11 PROCEDURE — 76816 OB US FOLLOW-UP PER FETUS: CPT | Performed by: OBSTETRICS & GYNECOLOGY

## 2020-05-11 NOTE — PATIENT INSTRUCTIONS
sometimes follow orgasm. Learn about  labor  · Watch for signs of  labor. You may be going into labor if:  ? You have menstrual-like cramps, with or without nausea. ? You have about 6 or more contractions in 1 hour, even after you have had a glass of water and are resting. ? You have a low, dull backache that does not go away when you change your position. ? You have pain or pressure in your pelvis that comes and goes in a pattern. ? You have intestinal cramping or flu-like symptoms, with or without diarrhea.  ? You notice an increase or change in your vaginal discharge. Discharge may be heavy, mucus-like, watery, or streaked with blood. ? Your water breaks. · If you think you have  labor:  ? Drink 2 or 3 glasses of water or juice. Not drinking enough fluids can cause contractions. ? Stop what you are doing, and empty your bladder. Then lie down on your left side for at least 1 hour. ? While lying on your side, find your breast bone. Put your fingers in the soft spot just below it. Move your fingers down toward your belly button to find the top of your uterus. Check to see if it is tight. ? Contractions can be weak or strong. Record your contractions for an hour. Time a contraction from the start of one contraction to the start of the next one.  ? Single or several strong contractions without a pattern are called Horton-Rene contractions. They are practice contractions but not the start of labor. They often stop if you change what you are doing. ? Call your doctor if you have regular contractions. Where can you learn more? Go to [email or web address] and sign in to your Glanse account. Enter Z323 in the [facility] box to learn more about \"Weeks 26 to 30 of Your Pregnancy: Care Instructions. \"     If you do not have an account, please click on the \"Sign Up Now\" link.   Current as of: May 29, 2019Content Version: 12.4  © 7458-4139 Healthwise, the rib cage and above the waist on either side of the back. ? Blood in your urine. Watch closely for changes in your health, and be sure to contact your doctor if:  · You have vaginal discharge that smells bad. · You have skin changes, such as:  ? A rash. ? Itching. ? Yellow color to your skin. · You have other concerns about your pregnancy. If you have labor signs at 37 weeks or more  If you have signs of labor at 37 weeks or more, your doctor may tell you to call when your labor becomes more active. Symptoms of active labor include:  · Contractions that are regular. · Contractions that are less than 5 minutes apart. · Contractions that are hard to talk through. Follow-up care is a key part of your treatment and safety. Be sure to make and go to all appointments, and call your doctor if you are having problems. It's also a good idea to know your test results and keep a list of the medicines you take. Where can you learn more? Go to [email or web address]://chpereyna.[email or web address] org and sign in to your Livelens account. Enter  in the [facility] box to learn more about \"Learning About When to Call Your Doctor During Pregnancy (After 20 Weeks). \"     If you do not have an account, please click on the \"Sign Up Now\" link. Current as of: May 29, 2019Content Version: 12.4  © 4059-6589 Healthwise, Incorporated. Care instructions adapted under license by [facility] ([facility]). If you have questions about a medical condition or this instruction, always ask your healthcare professional. [place] [address] any warranty or liability for your use of this information. Patient Education        Counting Your Baby's Kicks: Care Instructions  Your Care Instructions    Counting your baby's kicks is one way your doctor can tell that your baby is healthy. Most women--especially in a first pregnancy--feel their baby move for the first time between 16 and 22 weeks.  The movement may feel like flutters ask your healthcare professional. [place] [address] any warranty or liability for your use of this information.

## 2020-05-25 ENCOUNTER — HOSPITAL ENCOUNTER (OUTPATIENT)
Age: 40
Discharge: HOME | End: 2020-05-25
Payer: COMMERCIAL

## 2020-05-25 LAB
ABO/RH: NORMAL
ANTIBODY SCREEN: NORMAL
RHIG LOT NUMBER: NORMAL

## 2020-05-25 PROCEDURE — 96372 THER/PROPH/DIAG INJ SC/IM: CPT

## 2020-05-25 PROCEDURE — 86900 BLOOD TYPING SEROLOGIC ABO: CPT

## 2020-05-25 PROCEDURE — 86850 RBC ANTIBODY SCREEN: CPT

## 2020-05-25 PROCEDURE — 6360000002 HC RX W HCPCS: Performed by: LEGAL MEDICINE

## 2020-05-25 PROCEDURE — 86901 BLOOD TYPING SEROLOGIC RH(D): CPT

## 2020-05-25 PROCEDURE — 36415 COLL VENOUS BLD VENIPUNCTURE: CPT

## 2020-05-25 RX ADMIN — HUMAN RHO(D) IMMUNE GLOBULIN 300 MCG: 300 INJECTION, SOLUTION INTRAMUSCULAR at 12:32

## 2020-06-10 ENCOUNTER — ROUTINE PRENATAL (OUTPATIENT)
Dept: OBGYN CLINIC | Age: 40
End: 2020-06-10
Payer: COMMERCIAL

## 2020-06-10 VITALS
DIASTOLIC BLOOD PRESSURE: 60 MMHG | BODY MASS INDEX: 54.59 KG/M2 | WEIGHT: 293 LBS | HEART RATE: 98 BPM | TEMPERATURE: 97.6 F | SYSTOLIC BLOOD PRESSURE: 92 MMHG

## 2020-06-10 PROBLEM — Z3A.30 30 WEEKS GESTATION OF PREGNANCY: Status: ACTIVE | Noted: 2020-06-10

## 2020-06-10 PROBLEM — E55.9 VITAMIN D DEFICIENCY: Status: RESOLVED | Noted: 2017-04-27 | Resolved: 2020-06-10

## 2020-06-10 PROBLEM — Z3A.26 26 WEEKS GESTATION OF PREGNANCY: Status: RESOLVED | Noted: 2020-05-11 | Resolved: 2020-06-10

## 2020-06-10 PROBLEM — E16.1 HYPERINSULINEMIA: Status: RESOLVED | Noted: 2017-08-29 | Resolved: 2020-06-10

## 2020-06-10 PROBLEM — M54.40 ACUTE LEFT-SIDED LOW BACK PAIN WITH SCIATICA: Status: RESOLVED | Noted: 2017-04-26 | Resolved: 2020-06-10

## 2020-06-10 PROBLEM — E78.2 MIXED HYPERLIPIDEMIA: Status: RESOLVED | Noted: 2017-04-27 | Resolved: 2020-06-10

## 2020-06-10 PROBLEM — O24.415 GESTATIONAL DIABETES MELLITUS (GDM) IN THIRD TRIMESTER CONTROLLED ON ORAL HYPOGLYCEMIC DRUG: Status: ACTIVE | Noted: 2020-06-10

## 2020-06-10 PROCEDURE — 99211 OFF/OP EST MAY X REQ PHY/QHP: CPT

## 2020-06-10 PROCEDURE — 81002 URINALYSIS NONAUTO W/O SCOPE: CPT | Performed by: OBSTETRICS & GYNECOLOGY

## 2020-06-10 PROCEDURE — 99213 OFFICE O/P EST LOW 20 MIN: CPT | Performed by: OBSTETRICS & GYNECOLOGY

## 2020-06-10 PROCEDURE — 76816 OB US FOLLOW-UP PER FETUS: CPT | Performed by: OBSTETRICS & GYNECOLOGY

## 2020-06-10 PROCEDURE — 99213 OFFICE O/P EST LOW 20 MIN: CPT

## 2020-06-10 NOTE — PROGRESS NOTES
uIU/mL Final         IMPRESSION:   Tahir Charles is a 47 y.o. female :     RECOMMENDATIONS:  Patient Active Problem List    Diagnosis Date Noted    30 weeks gestation of pregnancy 06/10/2020    Gestational diabetes mellitus (GDM) in third trimester controlled on oral hypoglycemic drug 06/10/2020     Overview Note:     6/10/1010:  - Reviewed her blood glucose levels:   Fasting levels at target 20% of the time   2 hour PP are 80% of the time at target  - The risks associated with GDM were explained   - Maternal: preeclampsia.  delivery, polyhydramnios,  and/or operative delivery   - Fetal: Macrosomia, birth trauma, and minor  symptom  - I increased her Metformin to 1000 mg twice daily.  - She will be back after 1 week to reevaluate he blood glucose levels  - Post partum OGTT is recommended at 6 weeks PP  - Recommend:    1. US every 4 weeks for growth   2. BPP twice weekly after 32 weeks   3. Deliver at 40 to 38 weeks secondary to chronic hypertension  on Labetalol weeks        AMA (advanced maternal age) multigravida 35+ 04/10/2020     Overview Note:     At this visit, we discussed that there is an approximately 3% background chance of having a child with a major birth defect, intellectual disability, or genetic disorder. The increasing chance of numerical chromosomal abnormalities with increasing maternal age was reviewed. Based on her age at delivery, there is an approximately 1% chance for this pregnancy to have a numerical chromosome abnormality. Low risk cell free DNA aneuploidy screening. Declined invasive testing  Genetic counseling is available should she desire.      6/10/2020:  Reviewed      Obesity affecting pregnancy 04/10/2020     Overview Note:       We reviewed that obesity complicating pregnancy is associated with increased maternal and fetal risks including: gestational diabetes, hypertensive disorders, iatrogenic  delivery, dysfunctional labor, postterm pregnancy, large including death. The total patient time of the visit was 20 minutes, of which was greater than 50% of the time was spent counseling and coordinating care.     Margot Knutson MD

## 2020-06-10 NOTE — LETTER
[address] [facility] FETAL MEDICINE  [address] [address] [address]   Dept: [phone]  Loc: [phone]  Alina Mahan MD                                           [facility] Consultation Letter     6/10/20     Sharon Brown MD     Re: Patient: Micah Tuttle  YOB: 1980    MR Number: [id number] Date of Visit: 6/10/2020     Dear Dr. Jose E Bonilla had the pleasure of seeing your patient, Micah Tuttle, in consultation in the [facility] Fetal Medicine Department of [facility] ([facility]). Abnormal GTT, reviewed diabetic education with patient  States baby is moving, denies bleeding, leakage of fluid or contractions  Legs swollen but has vascular issues  Denies headaches or blurred vision  Metformin changed to 2000mg day, 1000mg am and pm  Will see vascular doctor as previously ordered. Call your obstetrician for:    Bleeding, leakage of fluid, abdominal tenderness, headaches, burred vision or  epigastric pain or decreased fetal movement or increased in urinary frequency.    Call if any questions or concerns    DOS: 6/10/20     [facility] MATERNAL FETAL MEDICINE      MATERNAL-FETAL MEDICINE CONSULT    Referring/Requesting Provider: Sarmad Aparicio MD  [facility], [address] [place] [address]       CHIEF COMPLAINT:     HISTORY OF PRESENT ILLNESS:   Maureen Edwards is a 47 y.o. female  at 27w4d who presents for ultrasound and consultation regarding  Gestational Diabetes  Chronic hypertension  Iliac arterial stents    OB HISTORY:  OB History    Para Term  AB Living   3 2 2     2   SAB TAB Ectopic Molar Multiple Live Births             2      # Outcome Date GA Lbr [place]/2nd Weight Sex Delivery Anes PTL Lv   3 Current            2 Term 08 40w0d  6 lb 10 oz (3.005 kg) M Vag-Spont  N SHEILA   1 Term 03 40w0d  7 lb 10 oz (3.459 kg) M Vag-Spont  N SHEILA       PAST MEDICAL HISTORY:  Past Medical History:   Diagnosis Date    Abnormal Pap smear of cervix Vitals signs and nursing note reviewed. BP 92/60   Pulse 98   Temp 97.6 °F (36.4 °C) (Temporal)   Wt (!) 402 lb 8 oz (182.6 kg)   BMI 54.59 kg/m²     FHT: Positive    IMAGING:  Please see ultrasound report for full details. LABS:    Hospital Outpatient Visit on 2020   Component Date Value Ref Range Status    ABO/Rh 2020 O NEG   Final    Antibody Screen 2020 NEG   Final    RHIG LOT NUMBER 2020 RhImmuneGlobulin    [id number]          issued       1 vial  20 12:15   Final   Routine Prenatal on 2020   Component Date Value Ref Range Status    Glucose, UA POC 2020 negative   Final    Protein, UA 2020 Negative  Negative Final   Hospital Outpatient Visit on 03/10/2020   Component Date Value Ref Range Status    T4 Free 03/10/2020 1.17  0.93 - 1.70 ng/dL Final    TSH 03/10/2020 3.330  0.270 - 4.200 uIU/mL Final   Abstract on 03/10/2020   Component Date Value Ref Range Status    TSH 2020 4.320  uIU/mL Final   Hospital Outpatient Visit on 2019   Component Date Value Ref Range Status    T4 Free 2019 1.14  0.93 - 1.70 ng/dL Final    TSH 2019 2.640  0.270 - 4.200 uIU/mL Final         IMPRESSION:   Arlin Nunez is a 47 y.o. female :     RECOMMENDATIONS:  Patient Active Problem List    Diagnosis Date Noted    30 weeks gestation of pregnancy 06/10/2020    Gestational diabetes mellitus (GDM) in third trimester controlled on oral hypoglycemic drug 06/10/2020     Overview Note:     6/10/1010:  - Reviewed her blood glucose levels:   Fasting levels at target 20% of the time   2 hour PP are 80% of the time at target  - The risks associated with GDM were explained   - Maternal: preeclampsia.   delivery, polyhydramnios,  and/or operative delivery   - Fetal: Macrosomia, birth trauma, and minor  symptom  - I increased her Metformin to 1000 mg twice daily.  - She will be back after 1 week to reevaluate he blood glucose levels ? Anesthesia consultation  ? Plan for adequate equipment for delivery that can accommodate maternal weight   ? After  delivery, provide adequate thromboprophylaxis   ? If  delivery, surgical technique needs to be modified for adequate exposure and postpartum care should be modified to reduce the risk of obesity-associated complications  ? Lactation consultation and encourage breastfeeding  ? I recommended an intrauterine contraception (Mirena) due to efficacy and protective affect against uterine cancer    Gastric bypass should be considered postpartum for this patient with multiple comorbidities     6/10/1010:  Reviewed        Status post insertion of iliac artery stent 04/10/2020     Overview Note:     Patient reports that she had bilateral iliac stents placed due to peripheral vascular disease in 2016. She has not followed with Vascular Surgery and records are not available for review. She reports these were placed after significant swelling, pain and a rash were present in both lower extremitites  She currently takes low dose aspirin  There is a paucity of data regarding bilateral stents and pregnancy. Pregnancy is a time of increased propensity for clotting, and in combination with obesity and theoretic clot at the stent, Lovenox 40 mg BID was ordered. Referral to Vascular Surgery for urgent appointment to discuss risks, management during pregnancy. Maternal echocardiogram is also recommended given significant peripheral vascular disease, obesity and longstanding hypertension. Interruption of pregnancy is not an option for this patient. She plans to pursue pregnancy despite signficant maternal and fetal risks. 6/10/2020: She will see vascular  On baby aspirin and on prophylactic Heparin  Recommend PP heparin x 6 weeks          Proteinuria affecting pregnancy 04/10/2020     Overview Note:     24 hour urine protein performed- 474 mg.

## 2020-06-16 ENCOUNTER — OFFICE VISIT (OUTPATIENT)
Dept: VASCULAR SURGERY | Age: 40
End: 2020-06-16
Payer: COMMERCIAL

## 2020-06-16 VITALS
RESPIRATION RATE: 18 BRPM | HEIGHT: 72 IN | DIASTOLIC BLOOD PRESSURE: 86 MMHG | HEART RATE: 80 BPM | WEIGHT: 293 LBS | BODY MASS INDEX: 39.68 KG/M2 | SYSTOLIC BLOOD PRESSURE: 150 MMHG

## 2020-06-16 PROBLEM — R60.0 BILATERAL EDEMA OF LOWER EXTREMITY: Status: ACTIVE | Noted: 2020-06-16

## 2020-06-16 PROCEDURE — G8417 CALC BMI ABV UP PARAM F/U: HCPCS | Performed by: SURGERY

## 2020-06-16 PROCEDURE — 99213 OFFICE O/P EST LOW 20 MIN: CPT | Performed by: SURGERY

## 2020-06-16 PROCEDURE — G8427 DOCREV CUR MEDS BY ELIG CLIN: HCPCS | Performed by: SURGERY

## 2020-06-16 PROCEDURE — 4004F PT TOBACCO SCREEN RCVD TLK: CPT | Performed by: SURGERY

## 2020-06-16 NOTE — PROGRESS NOTES
Vascular Surgery Outpatient Consultation      Chief Complaint   Patient presents with   [facility] Consultation     new pt. leg swelling  21 wks. gestation       Reason for Consult: Leg swelling    Requesting Physician:  Dr. Sangeeta Fernandez:                The patient is a 47 y.o. female who is referred for evaluation of leg swelling. She states a history of chronic leg swelling and underwent bilateral iliac vein stenting by Dr. Gilmar boyle at [facility] in 2016. She was not sure why she had the procedures. She states that her \"veins were compressed\". She denies diagnosis of may Thurner's syndrome. She denies history of DVT, venous insufficiency, or venous ulcerations. She is currently 21 weeks pregnant. Her weight has significantly increased. Past Medical History:        Diagnosis Date    Abnormal Pap smear of cervix     CAD (coronary artery disease)     SVT at time    Chronic back pain     Degenerative disc disease at L5-S1 level     Depression     Edema     Gestational diabetes mellitus (GDM) in third trimester controlled on oral hypoglycemic drug 6/10/2020    Hypertension     with just this pregnancies    Rh incompatibility     Rh sensitized     Thyroid disease      Past Surgical History:        Procedure Laterality Date    BACK SURGERY      LEEP      VEIN SURGERY Bilateral     stents     Current Medications:   Prior to Admission medications    Medication Sig Start Date End Date Taking?  Authorizing Provider   metFORMIN (GLUCOPHAGE) 500 MG tablet Take 500 mg by mouth 2 times daily (with meals) With meals and a snacks   Today change to 2000mg daily  1000mg am and pm   Yes Historical Provider, MD   Prenatal Vit-Fe Fumarate-FA (PRENATAL VITAMINS PO) Take 1 tablet by mouth daily   Yes Historical Provider, MD   labetalol (NORMODYNE) 100 MG tablet Take 100 mg by mouth daily   Yes Historical Provider, MD   enoxaparin (LOVENOX) 40 MG/0.4ML injection Inject 40 mg

## 2020-06-18 ENCOUNTER — ROUTINE PRENATAL (OUTPATIENT)
Dept: OBGYN CLINIC | Age: 40
End: 2020-06-18
Payer: COMMERCIAL

## 2020-06-18 VITALS
HEART RATE: 101 BPM | TEMPERATURE: 96.9 F | BODY MASS INDEX: 54.11 KG/M2 | DIASTOLIC BLOOD PRESSURE: 78 MMHG | WEIGHT: 293 LBS | SYSTOLIC BLOOD PRESSURE: 120 MMHG

## 2020-06-18 PROBLEM — Z3A.30 30 WEEKS GESTATION OF PREGNANCY: Status: RESOLVED | Noted: 2020-06-10 | Resolved: 2020-06-18

## 2020-06-18 PROCEDURE — 99213 OFFICE O/P EST LOW 20 MIN: CPT | Performed by: OBSTETRICS & GYNECOLOGY

## 2020-06-18 PROCEDURE — 76819 FETAL BIOPHYS PROFIL W/O NST: CPT | Performed by: OBSTETRICS & GYNECOLOGY

## 2020-06-18 PROCEDURE — 99213 OFFICE O/P EST LOW 20 MIN: CPT

## 2020-06-18 NOTE — PROGRESS NOTES
DIABETES AND PREGNANCY PROGRAM COMANAGEMENT    Referring/Requesting Provider: Jacinta Rankin MD   PCP: Fransisco Arroyo, APRN - CNP    CHIEF COMPLAINT: GDMA  T2DM  T1DM    HISTORY OF PRESENT ILLNESS:   Deysi Ren is a 47 y.o. Derryl Jason at [address] with [address] on Metformin. Blood glucose record was reviewed. Hyperglycemia is present fasting. She has rare postprandial excursions due to food choices. Jimi denies headache, visual changes, abdominal pain, nausea, vomiting, swelling. She has no OB complaints. Her leg swelling is unchanged. She has not completed a maternal echo and declines one at this time. She denies SOB, palpitations, chest pain/pressure.        OB History    Para Term  AB Living   3 2 2     2   SAB TAB Ectopic Molar Multiple Live Births             2      # Outcome Date GA Lbr Orlando/2nd Weight Sex Delivery Anes PTL Lv   3 Current            2 Term 08 40w0d  6 lb 10 oz (3.005 kg) M Vag-Spont  N SHEILA   1 Term 03 40w0d  7 lb 10 oz (3.459 kg) M Vag-Spont  N SHEILA     a  Past Medical History:   Diagnosis Date    Abnormal Pap smear of cervix     CAD (coronary artery disease)     SVT at time    Chronic back pain     Degenerative disc disease at L5-S1 level     Depression     Edema     Gestational diabetes mellitus (GDM) in third trimester controlled on oral hypoglycemic drug 6/10/2020    Hypertension     with just this pregnancies    Rh incompatibility     Rh sensitized     Thyroid disease        Past Surgical History:   Procedure Laterality Date    BACK SURGERY      LEEP      VEIN SURGERY Bilateral     stents       Current Outpatient Medications   Medication Sig Dispense Refill    metFORMIN (GLUCOPHAGE) 500 MG tablet Take 1 tablet by mouth 4 times daily (before meals and nightly) 500 mg with meals tid  Then 1000 mg @ night 120 tablet 0    Prenatal Vit-Fe Fumarate-FA (PRENATAL VITAMINS PO) Take 1 tablet by mouth daily      labetalol (NORMODYNE) 100 MG tablet Take 100 mg by mouth lower extremity 2020    AMA (advanced maternal age) multigravida 35+ 04/10/2020     Overview Note:     There is an approximately 3% background chance of having a child with a major birth defect, intellectual disability, or genetic disorder. The increasing chance of numerical chromosomal abnormalities with increasing maternal age was reviewed. Based on her age at delivery, there is an approximately 1% chance for this pregnancy to have a numerical chromosome abnormality. Low risk cell free DNA aneuploidy screening. Declined invasive testing and additional genetic counseling.  Obesity affecting pregnancy 04/10/2020     Overview Note:     Obesity complicating pregnancy is associated with increased maternal and fetal risks including: gestational diabetes, hypertensive disorders, iatrogenic  delivery, dysfunctional labor, postterm pregnancy, large for gestational age infant, shoulder dystocia, obstructive sleep apnea, postpartum hemorrhage, stillbirth, fetal anomalies,  delivery and postcesarean complications.  Nutrition consultation is recommended.  Reviewed diet, exercise, and weight gain. [place] of medicine recommend weight gain in pregnancy: Obese (all classes) > 30:  total weight gain 11-20 lbs, 0.5 lb per week in the second and third trimester.  Low dose aspirin is recommended.     Anesthesia consultation   Plan for adequate equipment for delivery that can accommodate maternal weight    After  delivery, provide adequate thromboprophylaxis    If  delivery, surgical technique needs to be modified for adequate exposure and postpartum care should be modified to reduce the risk of obesity-associated complications   Lactation consultation and encourage breastfeeding   I recommended an intrauterine contraception (Mirena) due to efficacy and protective affect against uterine cancer   Gastric bypass should be considered postpartum for this patient with testing starting no later than 32 weeks gestation. I also recommended that she have baseline urine protein creatinine ratio to evaluate her renal status as well as CBC and chemistry panel. These labs should be repeated each trimester or more frequently as clinically indicated. She should also have an baseline EKG. If there are abnormalities in the EKG she may need to have an echo of her heart. In addition, the [place] International Paper (2014) has recommended that low-dose aspirin 81 mg be initiated between 12 and 28 weeks of gestation to reduce the occurrence of preeclampsia,  birth, and fetal growth restriction in women at increased risk for preeclampsia. A thorough discussion of risk benefits and alternatives of management was performed with the patient. She stated understanding. BP normal today. Continue labetalol 100 mg daily          Hypothyroid in pregnancy, antepartum 04/10/2020     Overview Note:     - MFM consult completed  - Continue Synthroid 50 mcg daily.    - OB to order TSH every trimester, titrate synthroid to maintain normal TSH      Smoking 04/10/2020     Overview Note:     Strongly recommended smoking cessation. Discussed risks including death. The total patient time of the visit was 15 minutes, of which was greater than 50% of the time was spent counseling and coordinating care.

## 2020-06-18 NOTE — LETTER
[address]   Dept: [address] [place] [address] Street: [phone]  Darcie Bernheim, DO                                           [facility] Consultation Letter     20     Elizabeth Levine MD     Re: Patient: Hilario Acosta  YOB: 1980    MR Number: [id number] Date of Visit: 2020     Dear Dr. Teresita Ulrich had the pleasure of seeing your patient, Hilario Acosta, in consultation in the [facility] Fetal Medicine Department of [facility]). DIABETES AND PREGNANCY PROGRAM COMANAGEMENT    Referring/Requesting Provider: Elizabeth Levine MD   PCP: MARY Fierro CNP    CHIEF COMPLAINT: GDMA  T2DM  T1DM    HISTORY OF PRESENT ILLNESS:   Dyan Dale is a 47 y.o.  at [address] with [address] on Metformin. Blood glucose record was reviewed. Hyperglycemia is present fasting. She has rare postprandial excursions due to food choices. Jimi denies headache, visual changes, abdominal pain, nausea, vomiting, swelling. She has no OB complaints. Her leg swelling is unchanged. She has not completed a maternal echo and declines one at this time. She denies SOB, palpitations, chest pain/pressure.        OB History    Para Term  AB Living   3 2 2     2   SAB TAB Ectopic Molar Multiple Live Births             2      # Outcome Date GA Lbr Orlando/2nd Weight Sex Delivery Anes PTL Lv   3 Current            2 Term 08 40w0d  6 lb 10 oz (3.005 kg) M Vag-Spont  N SHEILA   1 Term 03 40w0d  7 lb 10 oz (3.459 kg) M Vag-Spont  N SHEILA     a  Past Medical History:   Diagnosis Date    Abnormal Pap smear of cervix     CAD (coronary artery disease)     SVT at time    Chronic back pain     Degenerative disc disease at L5-S1 level     Depression     Edema     Gestational diabetes mellitus (GDM) in third trimester controlled on oral hypoglycemic drug 6/10/2020    Hypertension     with just this pregnancies    Rh incompatibility     Rh sensitized  Thyroid disease        Past Surgical History:   Procedure Laterality Date    BACK SURGERY      LEEP      VEIN SURGERY Bilateral     stents       Current Outpatient Medications   Medication Sig Dispense Refill    metFORMIN (GLUCOPHAGE) 500 MG tablet Take 1 tablet by mouth 4 times daily (before meals and nightly) 500 mg with meals tid  Then 1000 mg @ night 120 tablet 0    Prenatal Vit-Fe Fumarate-FA (PRENATAL VITAMINS PO) Take 1 tablet by mouth daily      labetalol (NORMODYNE) 100 MG tablet Take 100 mg by mouth daily      enoxaparin (LOVENOX) 40 MG/0.4ML injection Inject 40 mg subcutaneous BID 30 Syringe 3    levothyroxine (SYNTHROID) 50 MCG tablet Take 1 tablet by mouth daily 30 tablet 2    Crisaborole (EUCRISA) 2 % OINT Apply 1 applicator topically 2 times daily 1 Tube 0    busPIRone (BUSPAR) 15 MG tablet Take 15 mg by mouth daily 14 tablet 0    aspirin 81 MG tablet Take 81 mg by mouth daily       No current facility-administered medications for this visit. No Known Allergies    PHYSICAL EXAM:  VITAL SIGNS: /78   Pulse 101   Temp 96.9 °F (36.1 °C) (Temporal)   Wt (!) 399 lb (181 kg)   BMI 54.11 kg/m²    AAOx3, NAD  Resp effort normal  LE swelling bilaterally especially below the knees, negative Abeba's    IMAGING:  See report    IMPRESSION AND RECOMMENDATIONS:  Jimi is a   Patient Active Problem List    Diagnosis Date Noted    Gestational diabetes mellitus (GDM) in third trimester controlled on oral hypoglycemic drug 06/10/2020     Priority: High     Overview Note:     - Metformin taking irregularly. - We reviewed the option of discontinuing Meformin and switching to insulin. She would like to give Metformin another week to see if she can have controlled BG on Metformin.  - Her fasting BG are . - We reviewed the maximum dose of immediate release Metformin is 2550 mg per day. Her metformin is 500 mg.  We reviewed the importance of taking 5 ? Plan for adequate equipment for delivery that can accommodate maternal weight   ? After  delivery, provide adequate thromboprophylaxis   ? If  delivery, surgical technique needs to be modified for adequate exposure and postpartum care should be modified to reduce the risk of obesity-associated complications  ? Lactation consultation and encourage breastfeeding  ? I recommended an intrauterine contraception (Mirena) due to efficacy and protective affect against uterine cancer  ? Gastric bypass should be considered postpartum for this patient with multiple comorbidities           Status post insertion of iliac artery stent 04/10/2020     Overview Note:     Patient reports that she had bilateral iliac stents placed due to peripheral vascular disease in 2016. She currently takes low dose aspirin and Lovenox. Referred to Vascular Surgery and no additional recommendations at this time. Postpartum weight loss of 50 lbs and follow-up planned for December. Recommend PP heparin x 6 weeks  Maternal echocardiogram is also recommended given significant peripheral vascular disease, obesity and longstanding hypertension. Jimi reports she had a normal echo in the past and declines repeat echo this pregnancy.  Proteinuria affecting pregnancy 04/10/2020     Overview Note:     24 hour urine protein performed- 474 mg. She has recurrent UTI- normal renal ultrasound  Cr 1.14  The increased risks were discussed including worsening renal function, need for dialysis, hypertensive disorders. Nephrology consultation is recommended.  Elevated white blood cell count 04/10/2020     Overview Note:     Patient had recent elevated WBC count of 17.1. She reports that this was  Not during UTI or other infection. Dr. Alia Baumann has referred to Hematology. Await their recommendations.         Hypertension affecting pregnancy, antepartum 04/10/2020     Overview Note: I discussed with the patient concerns regarding hypertension in pregnancy and the increased risk of adverse pregnancy outcomes including but not limited to fetal growth restriction, placental abruption, prematurity and increased risk of stillbirth. I discussed with her that there is also an increased risk of developing superimposed preeclampsia. I also discussed with the patient the goals of hypertension control during pregnancy and medications that can be safely used during pregnancy. We recommend increased monitoring with more frequent visits, serial ultrasounds to monitor growth and  testing starting no later than 32 weeks gestation. I also recommended that she have baseline urine protein creatinine ratio to evaluate her renal status as well as CBC and chemistry panel. These labs should be repeated each trimester or more frequently as clinically indicated. She should also have an baseline EKG. If there are abnormalities in the EKG she may need to have an echo of her heart. In addition, the [place] International Paper (2014) has recommended that low-dose aspirin 81 mg be initiated between 12 and 28 weeks of gestation to reduce the occurrence of preeclampsia,  birth, and fetal growth restriction in women at increased risk for preeclampsia. A thorough discussion of risk benefits and alternatives of management was performed with the patient. She stated understanding. BP normal today. Continue labetalol 100 mg daily          Hypothyroid in pregnancy, antepartum 04/10/2020     Overview Note:     - MFM consult completed  - Continue Synthroid 50 mcg daily.    - OB to order TSH every trimester, titrate synthroid to maintain normal TSH      Smoking 04/10/2020     Overview Note:     Strongly recommended smoking cessation. Discussed risks including death.               The total patient time of the visit was 15 minutes, of which was greater

## 2020-06-18 NOTE — PROGRESS NOTES
Saw vascular, states everything  is ok, not concerning  States baby moving, doing daily kick counts  Denies bleeding, leakage of fluid or contractions  Diabetic log scan to chart  Call your obstetrician for:    Bleeding, leakage of fluid, abdominal tenderness, headaches, burred vision or  epigastric pain or decreased fetal movement or increased in urinary frequency.    Call if any questions or concerns

## 2020-06-30 ENCOUNTER — ROUTINE PRENATAL (OUTPATIENT)
Dept: OBGYN CLINIC | Age: 40
End: 2020-06-30
Payer: COMMERCIAL

## 2020-06-30 VITALS
TEMPERATURE: 97.1 F | WEIGHT: 293 LBS | DIASTOLIC BLOOD PRESSURE: 72 MMHG | HEART RATE: 106 BPM | SYSTOLIC BLOOD PRESSURE: 110 MMHG | BODY MASS INDEX: 54.71 KG/M2

## 2020-06-30 PROBLEM — Z3A.33 33 WEEKS GESTATION OF PREGNANCY: Status: ACTIVE | Noted: 2020-06-30

## 2020-06-30 LAB
GLUCOSE URINE, POC: NEGATIVE
PROTEIN UA: POSITIVE

## 2020-06-30 PROCEDURE — 81002 URINALYSIS NONAUTO W/O SCOPE: CPT | Performed by: OBSTETRICS & GYNECOLOGY

## 2020-06-30 PROCEDURE — 76819 FETAL BIOPHYS PROFIL W/O NST: CPT | Performed by: OBSTETRICS & GYNECOLOGY

## 2020-06-30 PROCEDURE — 99213 OFFICE O/P EST LOW 20 MIN: CPT | Performed by: OBSTETRICS & GYNECOLOGY

## 2020-06-30 NOTE — PROGRESS NOTES
Baby is moving, doing daily kick counts  Denies bleeding, leakage of fluid or contractions  Denies headaches or blurred vision  Legs swollen to PVD  Call your obstetrician for:    Bleeding, leakage of fluid, abdominal tenderness, headaches, burred vision or  epigastric pain or decreased fetal movement or increased in urinary frequency.    Call if any questions or concerns
(GLUCOPHAGE) 500 MG tablet Take 1 tablet by mouth 4 times daily (before meals and nightly) 500 mg with meals tid  Then 1000 mg @ night 120 tablet 0    Prenatal Vit-Fe Fumarate-FA (PRENATAL VITAMINS PO) Take 1 tablet by mouth daily      labetalol (NORMODYNE) 100 MG tablet Take 100 mg by mouth daily      enoxaparin (LOVENOX) 40 MG/0.4ML injection Inject 40 mg subcutaneous BID 30 Syringe 3    levothyroxine (SYNTHROID) 50 MCG tablet Take 1 tablet by mouth daily 30 tablet 2    Crisaborole (EUCRISA) 2 % OINT Apply 1 applicator topically 2 times daily 1 Tube 0    busPIRone (BUSPAR) 15 MG tablet Take 15 mg by mouth daily 14 tablet 0    aspirin 81 MG tablet Take 81 mg by mouth daily       No current facility-administered medications for this visit. No Known Allergies    PHYSICAL EXAM:  VITAL SIGNS: /72   Pulse 106   Temp 97.1 °F (36.2 °C) (Temporal)   Wt (!) 403 lb 6.4 oz (183 kg)   BMI 54.71 kg/m²   AAOx3, NAD    IMAGING: BPP 8/8       IMPRESSION AND RECOMMENDATIONS:  Lawrence Watson is a [id number]  Patient Active Problem List    Diagnosis Date Noted    33 weeks gestation of pregnancy 06/30/2020    Bilateral edema of lower extremity 06/16/2020    Gestational diabetes mellitus (GDM) in third trimester controlled on oral hypoglycemic drug 06/10/2020     Overview Note:     - Metformin taking irregularly. - We reviewed the option of discontinuing Meformin and switching to insulin. She would like to give Metformin another week to see if she can have controlled BG on Metformin.  - Her fasting BG are . - We reviewed the maximum dose of immediate release Metformin is 2550 mg per day. Her metformin is 500 mg. We reviewed the importance of taking 5 tabs per day and not to exceed this. She will take 2 tabs (1000 mg at bedtime) and 1 tab before breakfast, lunch and dinner for a total daily dose of 2500mg.   - If BG are not at goal next week, will switch to insulin for the remainder of pregnancy.   - Post partum

## 2020-06-30 NOTE — PATIENT INSTRUCTIONS
problems. It's also a good idea to know your test results and keep a list of the medicines you take. How do you count fetal kicks? · A common method of checking your baby's movement is to count the number of kicks or moves you feel in 1 hour. Ten movements (such as kicks, flutters, or rolls) in 1 hour are normal. Some doctors suggest that you count in the morning until you get to 10 movements. Then you can quit for that day and start again the next day. · Pick your baby's most active time of day to count. This may be any time from morning to evening. · If you do not feel 10 movements in an hour, your baby may be sleeping. Wait for the next hour and count again. When should you call for help? Call your doctor now or seek immediate medical care if:  · You noticed that your baby has stopped moving or is moving much less than normal.  Watch closely for changes in your health, and be sure to contact your doctor if you have any problems. Where can you learn more? Go to https://Estoreify.Investicare. org and sign in to your [employer] account. Enter T485 in the [employer] box to learn more about \"Counting Your Baby's Kicks: Care Instructions. \"     If you do not have an account, please click on the \"Sign Up Now\" link. Current as of: February 11, 2020               Content Version: 12.5  © 5460-9721 Healthwise, Incorporated. Care instructions adapted under license by [facility] [address] If you have questions about a medical condition or this instruction, always ask your healthcare professional. [place] [address] any warranty or liability for your use of this information.

## 2020-07-06 LAB
GLUCOSE URINE, POC: NEGATIVE
PROTEIN UA: POSITIVE

## 2020-07-07 ENCOUNTER — TELEPHONE (OUTPATIENT)
Dept: OBGYN CLINIC | Age: 40
End: 2020-07-07

## 2020-07-07 ENCOUNTER — ROUTINE PRENATAL (OUTPATIENT)
Dept: OBGYN CLINIC | Age: 40
End: 2020-07-07
Payer: COMMERCIAL

## 2020-07-07 VITALS
BODY MASS INDEX: 53.73 KG/M2 | SYSTOLIC BLOOD PRESSURE: 123 MMHG | DIASTOLIC BLOOD PRESSURE: 87 MMHG | WEIGHT: 293 LBS | TEMPERATURE: 97.1 F | HEART RATE: 110 BPM

## 2020-07-07 PROBLEM — Z3A.34 34 WEEKS GESTATION OF PREGNANCY: Status: ACTIVE | Noted: 2020-07-07

## 2020-07-07 PROBLEM — Z3A.33 33 WEEKS GESTATION OF PREGNANCY: Status: RESOLVED | Noted: 2020-06-30 | Resolved: 2020-07-07

## 2020-07-07 LAB
GLUCOSE URINE, POC: NEGATIVE
PROTEIN UA: NEGATIVE

## 2020-07-07 PROCEDURE — 81002 URINALYSIS NONAUTO W/O SCOPE: CPT | Performed by: OBSTETRICS & GYNECOLOGY

## 2020-07-07 PROCEDURE — 99213 OFFICE O/P EST LOW 20 MIN: CPT | Performed by: OBSTETRICS & GYNECOLOGY

## 2020-07-07 PROCEDURE — 76819 FETAL BIOPHYS PROFIL W/O NST: CPT | Performed by: OBSTETRICS & GYNECOLOGY

## 2020-07-07 RX ORDER — LEVOTHYROXINE SODIUM 0.05 MG/1
TABLET ORAL
Qty: 30 TABLET | Refills: 2 | Status: SHIPPED
Start: 2020-07-07 | End: 2020-10-05 | Stop reason: SDUPTHER

## 2020-07-07 NOTE — PROGRESS NOTES
States baby is active Denies bleeding leakage of fluid or contractions. Do kick counts daily  Good to do same time of day for the same amount of time  Should have 10 kick /1 hour, if less: decrease in fetal movement call OB. If not able to get OB, go directly to L&D due to decreased fetal movement  Reviewed diabetic education with patient as started. Discussed diet as well as testing blood sugars per doctors orders. Blood sugar levels to be maintained explained, fasting as well as post meals, discussed insulin administration,Patient doing very well! Pharmacy [facility] called aware of new orders. Questions answered and patient verbalized understanding. Call your obstetrician for:    Bleeding, leakage of fluid, abdominal tenderness, headaches, burred vision or  epigastric pain or decreased fetal movement or increased in urinary frequency.    Call if any questions or concerns

## 2020-07-07 NOTE — PROGRESS NOTES
DIABETES AND PREGNANCY PROGRAM COMANAGEMENT    Referring/Requesting Provider: Elaina Barrera MD   PCP: Lenore Tomlin, APRN - CNP    CHIEF COMPLAINT: GDMA2      HISTORY OF PRESENT ILLNESS:   Brandyn Payan is a 47 y.o.  at 34w1d with Metformin 500mg with meals and 1000mg at night.    Blood glucose record was reviewed. Continued elevated fastings open to insulin start at hs if needed today.   We again addressed delivery timing for 37-38 weeks given her chronic hypertension and multiple co-morbidities     OB History    Para Term  AB Living   3 2 2     2   SAB TAB Ectopic Molar Multiple Live Births             2      # Outcome Date GA Lbr [place]/2nd Weight Sex Delivery Anes PTL Lv   3 Current            2 Term 08 40w0d  6 lb 10 oz (3.005 kg) M Vag-Spont  N SHEILA   1 Term 03 40w0d  7 lb 10 oz (3.459 kg) M Vag-Spont  N SHEILA     a  Past Medical History:   Diagnosis Date    Abnormal Pap smear of cervix     CAD (coronary artery disease)     SVT at time    Chronic back pain     Degenerative disc disease at L5-S1 level     Depression     Edema     Gestational diabetes mellitus (GDM) in third trimester controlled on oral hypoglycemic drug 6/10/2020    Hypertension     with just this pregnancies    Rh incompatibility     Rh sensitized     Thyroid disease        Past Surgical History:   Procedure Laterality Date    BACK SURGERY      LEEP      VEIN SURGERY Bilateral     stents       Current Outpatient Medications   Medication Sig Dispense Refill    insulin NPH (HUMULIN N;NOVOLIN N) 100 UNIT/ML injection vial Inject 10 Units into the skin nightly      metFORMIN (GLUCOPHAGE) 500 MG tablet Take 1 tablet by mouth 4 times daily (before meals and nightly) 500 mg with meals tid  Then 1000 mg @ night 120 tablet 0    Prenatal Vit-Fe Fumarate-FA (PRENATAL VITAMINS PO) Take 1 tablet by mouth daily      labetalol (NORMODYNE) 100 MG tablet Take 100 mg by mouth daily      enoxaparin (LOVENOX) 40 MG/0.4ML injection Inject 40 mg subcutaneous BID 30 Syringe 3    levothyroxine (SYNTHROID) 50 MCG tablet Take 1 tablet by mouth daily 30 tablet 2    Crisaborole (EUCRISA) 2 % OINT Apply 1 applicator topically 2 times daily 1 Tube 0    busPIRone (BUSPAR) 15 MG tablet Take 15 mg by mouth daily 14 tablet 0    aspirin 81 MG tablet Take 81 mg by mouth daily       No current facility-administered medications for this visit. No Known Allergies    PHYSICAL EXAM:  VITAL SIGNS: /87   Pulse 110   Temp 97.1 °F (36.2 °C) (Temporal)   Wt (!) 396 lb 3.2 oz (179.7 kg)   BMI 53.73 kg/m²   AAOx3, NAD    IMAGING:  BPP 8/8 and BREECH! IMPRESSION AND RECOMMENDATIONS:  Kylah Zazueta is a   Patient Active Problem List    Diagnosis Date Noted    34 weeks gestation of pregnancy 07/07/2020    Bilateral edema of lower extremity 06/16/2020    Gestational diabetes mellitus (GDM) in third trimester controlled on oral hypoglycemic drug 06/10/2020     Overview Note:     - Metformin taking regularly on 7/7/20 and reports good compliance!   - We reviewed the option of discontinuing Meformin and switching to insulin. Very reluctant, but understands the rationale behind why insulin may control fastings   - Her fasting BG are . - We reviewed the maximum dose of immediate release Metformin is 2550 mg per day. Her metformin is 500 mg. We reviewed the importance of taking 5 tabs per day and not to exceed this.  She will take 2 tabs (1000 mg at bedtime) and 1 tab before breakfast, lunch and dinner for a total daily dose of 2500mg.   - FSBS of fasting continued elevation, reluctant to take insulin but was able to discuss NPH at night 10 units and she will call in the next 48 hours if continued elevation discussed increases, continue Metformin given her current GA of 34 1/7 weeks GA on 7/7/20   - Post partum OGTT is recommended at 4-6 weeks PP  - We reviewed the benefits of breastfeeding.  - Recommend:    1. US every 4 weeks for growth   2.  fetal testing twice weekly after 32 weeks   3. Deliver at 40 to 38 weeks secondary to chronic hypertension on Labetalol, with other multiple medical co-morbidities         AMA (advanced maternal age) multigravida 35+ 04/10/2020     Overview Note:     There is an approximately 3% background chance of having a child with a major birth defect, intellectual disability, or genetic disorder. The increasing chance of numerical chromosomal abnormalities with increasing maternal age was reviewed. Based on her age at delivery, there is an approximately 1% chance for this pregnancy to have a numerical chromosome abnormality. Low risk cell free DNA aneuploidy screening. Declined invasive testing and additional genetic counseling.  Obesity affecting pregnancy 04/10/2020     Overview Note:     Obesity complicating pregnancy is associated with increased maternal and fetal risks including: gestational diabetes, hypertensive disorders, iatrogenic  delivery, dysfunctional labor, postterm pregnancy, large for gestational age infant, shoulder dystocia, obstructive sleep apnea, postpartum hemorrhage, stillbirth, fetal anomalies,  delivery and postcesarean complications.  Nutrition consultation is recommended.  Reviewed diet, exercise, and weight gain. [place] of medicine recommend weight gain in pregnancy: Obese (all classes) > 30:  total weight gain 11-20 lbs, 0.5 lb per week in the second and third trimester.  Low dose aspirin is recommended.     Anesthesia consultation   Plan for adequate equipment for delivery that can accommodate maternal weight    After  delivery, provide adequate thromboprophylaxis    If  delivery, surgical technique needs to be modified for adequate exposure and postpartum care should be modified to reduce the risk of obesity-associated complications   Lactation consultation and encourage breastfeeding   I recommended an intrauterine contraception (Mirena) due to efficacy and protective affect against uterine cancer   Gastric bypass should be considered postpartum for this patient with multiple comorbidities           Status post insertion of iliac artery stent 04/10/2020     Overview Note:     Patient reports that she had bilateral iliac stents placed due to peripheral vascular disease in 2016. She currently takes low dose aspirin and Lovenox. Referred to Vascular Surgery and no additional recommendations at this time. Postpartum weight loss of 50 lbs and follow-up planned for December. Recommend PP heparin x 6 weeks  Maternal echocardiogram is also recommended given significant peripheral vascular disease, obesity and longstanding hypertension. Jimi reports she had a normal echo in the past and declines repeat echo this pregnancy. Open today to referral 6/30/20             Proteinuria affecting pregnancy 04/10/2020     Overview Note:     24 hour urine protein performed- 474 mg. She has recurrent UTI- normal renal ultrasound  Cr 1.14  The increased risks were discussed including worsening renal function, need for dialysis, hypertensive disorders. Nephrology consultation is recommended.  Elevated white blood cell count 04/10/2020     Overview Note:     Patient had recent elevated WBC count of 17.1. She reports that this was  Not during UTI or other infection. Dr. Mena Leader has referred to Hematology. Did not keep appointment   Await their recommendations.  Hypertension affecting pregnancy, antepartum 04/10/2020     Overview Note:     I discussed with the patient concerns regarding hypertension in pregnancy and the increased risk of adverse pregnancy outcomes including but not limited to fetal growth restriction, placental abruption, prematurity and increased risk of stillbirth. I discussed with her that there is also an increased risk of developing superimposed preeclampsia.  I also discussed with the patient

## 2020-07-07 NOTE — PATIENT INSTRUCTIONS
Patient Education        Weeks 34 to 39 of Your Pregnancy: Care Instructions  Your Care Instructions     By now, your baby and your belly have grown quite large. It is almost time to give birth. Your baby's lungs are almost ready to breathe air. The bones in your baby's head are now firm enough to protect it, but soft enough to move down through the birth canal.  You may feel excited, happy, anxious, or scared. You may wonder how you will know if you are in labor or what to expect during labor. Try to be flexible in your expectations of the birth. Because each birth is different, there is no way to know exactly what childbirth will be like for you. This care sheet will help you know what to expect and how to prepare. This may make your childbirth easier. If you haven't already had the Tdap shot during this pregnancy, talk to your doctor about getting it. It will help protect your  against pertussis infection. In the 36th week, most women have a test for group B streptococcus (GBS). GBS is a common bacteria that can live in the vagina and rectum. It can make your baby sick after birth. If you test positive, you will get antibiotics during labor. The medicine will keep your baby from getting the bacteria. Follow-up care is a key part of your treatment and safety. Be sure to make and go to all appointments, and call your doctor if you are having problems. It's also a good idea to know your test results and keep a list of the medicines you take. How can you care for yourself at home? Learn about pain relief choices  · Pain is different for every woman. Talk with your doctor about your feelings about pain. · You can choose from several types of pain relief. These include medicine or breathing techniques, as well as comfort measures. You can use more than one option. · If you choose to have pain medicine during labor, talk to your doctor about your options.  Some medicines lower anxiety and help with some of the pain. Others make your lower body numb so that you won't feel pain. · Be sure to tell your doctor about your pain medicine choice before you start labor or very early in your labor. You may be able to change your mind as labor progresses. · Rarely, a woman is put to sleep by medicine given through a mask or an IV. Labor and delivery  · The first stage of labor has three parts: early, active, and transition. ? Most women have early labor at home. You can stay busy or rest, eat light snacks, drink clear fluids, and start counting contractions. ? When talking during a contraction gets hard, you may be moving to active labor. During active labor, you should head for the hospital if you are not there already. ? You are in active labor when contractions come every 3 to 4 minutes and last about 60 seconds. Your cervix is opening more rapidly. ? If your water breaks, contractions will come faster and stronger. ? During transition, your cervix is stretching, and contractions are coming more rapidly. ? You may want to push, but your cervix might not be ready. Your doctor will tell you when to push. · The second stage starts when your cervix is completely opened and you are ready to push. ? Contractions are very strong to push the baby down the birth canal.  ? You will feel the urge to push. You may feel like you need to have a bowel movement. ? You may be coached to push with contractions. These contractions will be very strong, but you will not have them as often. You can get a little rest between contractions. ? You may be emotional and irritable. You may not be aware of what is going on around you.  ? One last push, and your baby is born. · The third stage is when a few more contractions push out the placenta. This may take 30 minutes or less. · The fourth stage is the welcome recovery. You may feel overwhelmed with emotions and exhausted but alert. This is a good time to start breastfeeding.   [facility] [place].)  · You think that you may be in labor. This means that you've had at least 6 contractions in an hour. · You notice that your baby has stopped moving or is moving much less than normal.  · You have symptoms of a urinary tract infection. These may include:  ? Pain or burning when you urinate. ? A frequent need to urinate without being able to pass much urine. ? Pain in the flank, which is just below the rib cage and above the waist on either side of the back. ? Blood in your urine. Watch closely for changes in your health, and be sure to contact your doctor if:  · You have vaginal discharge that smells bad. · You have skin changes, such as:  ? A rash. ? Itching. ? Yellow color to your skin. · You have other concerns about your pregnancy. If you have labor signs at 37 weeks or more  If you have signs of labor at 37 weeks or more, your doctor may tell you to call when your labor becomes more active. Symptoms of active labor include:  · Contractions that are regular. · Contractions that are less than 5 minutes apart. · Contractions that are hard to talk through. Follow-up care is a key part of your treatment and safety. Be sure to make and go to all appointments, and call your doctor if you are having problems. It's also a good idea to know your test results and keep a list of the medicines you take. Where can you learn more? Go to https://Supponormckay.RotoPop. org and sign in to your [employer] account. Enter  in the [facility] box to learn more about \"Learning About When to Call Your Doctor During Pregnancy (After 20 Weeks). \"     If you do not have an account, please click on the \"Sign Up Now\" link. Current as of: February 11, 2020               Content Version: 12.5  © 2684-5247 Healthwise, Incorporated. Care instructions adapted under license by [facility] [place] ([facility]).  If you have questions about a medical condition or this instruction, always ask your healthcare professional.

## 2020-07-07 NOTE — LETTER
[address] [facility] FETAL MEDICINE  [address] [address] [address]   Dept: [phone]  Loc: [phone]  Hamilton Elizabeth MD                                           [facility] Consultation Letter     20     Sara Hankins MD     Re: Patient: Sharee Bonilla  YOB: 1980    MR Number: [id number] Date of Visit: 2020     Dear Dr. Sukhwinder Manning had the pleasure of seeing your patient, Sharee Bonilla, in consultation in the [facility] Fetal Medicine Department of [facility]). DIABETES AND PREGNANCY PROGRAM COMANAGEMENT    Referring/Requesting Provider: Sara Hankins MD   PCP: MARY Shaffer - CNP    CHIEF COMPLAINT: GDMA2      HISTORY OF PRESENT ILLNESS:   Chris Lombardo is a 47 y.o.  at 34w1d with Metformin 500mg with meals and 1000mg at night.    Blood glucose record was reviewed. Continued elevated fastings open to insulin start at hs if needed today.   We again addressed delivery timing for 37-38 weeks given her chronic hypertension and multiple co-morbidities     OB History    Para Term  AB Living   3 2 2     2   SAB TAB Ectopic Molar Multiple Live Births             2      # Outcome Date GA Lbr [place]/2nd Weight Sex Delivery Anes PTL Lv   3 Current            2 Term 08 40w0d  6 lb 10 oz (3.005 kg) M Vag-Spont  N SHEILA   1 Term 03 40w0d  7 lb 10 oz (3.459 kg) M Vag-Spont  N SHEILA     a  Past Medical History:   Diagnosis Date    Abnormal Pap smear of cervix     CAD (coronary artery disease)     SVT at time    Chronic back pain     Degenerative disc disease at L5-S1 level     Depression     Edema     Gestational diabetes mellitus (GDM) in third trimester controlled on oral hypoglycemic drug 6/10/2020    Hypertension     with just this pregnancies    Rh incompatibility     Rh sensitized     Thyroid disease        Past Surgical History:   Procedure Laterality Date    BACK SURGERY      LEEP      VEIN SURGERY Bilateral     stents Current Outpatient Medications   Medication Sig Dispense Refill    insulin NPH (HUMULIN N;NOVOLIN N) 100 UNIT/ML injection vial Inject 10 Units into the skin nightly      metFORMIN (GLUCOPHAGE) 500 MG tablet Take 1 tablet by mouth 4 times daily (before meals and nightly) 500 mg with meals tid  Then 1000 mg @ night 120 tablet 0    Prenatal Vit-Fe Fumarate-FA (PRENATAL VITAMINS PO) Take 1 tablet by mouth daily      labetalol (NORMODYNE) 100 MG tablet Take 100 mg by mouth daily      enoxaparin (LOVENOX) 40 MG/0.4ML injection Inject 40 mg subcutaneous BID 30 Syringe 3    levothyroxine (SYNTHROID) 50 MCG tablet Take 1 tablet by mouth daily 30 tablet 2    Crisaborole (EUCRISA) 2 % OINT Apply 1 applicator topically 2 times daily 1 Tube 0    busPIRone (BUSPAR) 15 MG tablet Take 15 mg by mouth daily 14 tablet 0    aspirin 81 MG tablet Take 81 mg by mouth daily       No current facility-administered medications for this visit. No Known Allergies    PHYSICAL EXAM:  VITAL SIGNS: /87   Pulse 110   Temp 97.1 °F (36.2 °C) (Temporal)   Wt (!) 396 lb 3.2 oz (179.7 kg)   BMI 53.73 kg/m²    AAOx3, NAD    IMAGING:  BPP 8/8 and BREECH! IMPRESSION AND RECOMMENDATIONS:  Kandi Doss is a   Patient Active Problem List    Diagnosis Date Noted    34 weeks gestation of pregnancy 07/07/2020    Bilateral edema of lower extremity 06/16/2020    Gestational diabetes mellitus (GDM) in third trimester controlled on oral hypoglycemic drug 06/10/2020     Overview Note:     - Metformin taking regularly on 7/7/20 and reports good compliance!   - We reviewed the option of discontinuing Meformin and switching to insulin. Very reluctant, but understands the rationale behind why insulin may control fastings   - Her fasting BG are . - We reviewed the maximum dose of immediate release Metformin is 2550 mg per day. Her metformin is 500 mg.  We reviewed the importance of taking 5 tabs per day and not to exceed this. She will take 2 tabs (1000 mg at bedtime) and 1 tab before breakfast, lunch and dinner for a total daily dose of 2500mg.   - FSBS of fasting continued elevation, reluctant to take insulin but was able to discuss NPH at night 10 units and she will call in the next 48 hours if continued elevation discussed increases, continue Metformin given her current GA of 34 1/7 weeks GA on 20   - Post partum OGTT is recommended at 4-6 weeks PP  - We reviewed the benefits of breastfeeding.  - Recommend:    1. US every 4 weeks for growth   2.  fetal testing twice weekly after 32 weeks   3. Deliver at 40 to 38 weeks secondary to chronic hypertension on Labetalol, with other multiple medical co-morbidities         AMA (advanced maternal age) multigravida 35+ 04/10/2020     Overview Note:     There is an approximately 3% background chance of having a child with a major birth defect, intellectual disability, or genetic disorder. The increasing chance of numerical chromosomal abnormalities with increasing maternal age was reviewed. Based on her age at delivery, there is an approximately 1% chance for this pregnancy to have a numerical chromosome abnormality. Low risk cell free DNA aneuploidy screening. Declined invasive testing and additional genetic counseling.  Obesity affecting pregnancy 04/10/2020     Overview Note:     Obesity complicating pregnancy is associated with increased maternal and fetal risks including: gestational diabetes, hypertensive disorders, iatrogenic  delivery, dysfunctional labor, postterm pregnancy, large for gestational age infant, shoulder dystocia, obstructive sleep apnea, postpartum hemorrhage, stillbirth, fetal anomalies,  delivery and postcesarean complications. ? Nutrition consultation is recommended. ? Reviewed diet, exercise, and weight gain.   [place] of medicine - OB to order TSH every trimester, titrate synthroid to maintain normal TSH      Smoking 04/10/2020     Overview Note:     Strongly recommended smoking cessation. Discussed risks including death. The total patient time of the visit was 15 minutes, of which was greater than 50% of the time was spent counseling and coordinating care. Thank you for allowing us to participate in the care of your patient. Should you or the family have any questions or concerns, please do not hesitate to contact us.     Sincerely,    Petros Blackmon MD

## 2020-07-08 ENCOUNTER — TELEPHONE (OUTPATIENT)
Dept: OBGYN CLINIC | Age: 40
End: 2020-07-08

## 2020-07-08 NOTE — TELEPHONE ENCOUNTER
Talked with Jimi, she knows medication will be clear. She knows to take 10 units of lantus each night ( no more) she will call if fasting Blood sugars are too low. Fasting to be about 75-90 at this point running about 115 currently without insulin. Jimi repeated instructions back, verbalized understanding and will call Monday with blood sugar results. Instructed to call if any issues or concerns, verbalized understanding!

## 2020-07-13 ENCOUNTER — TELEPHONE (OUTPATIENT)
Dept: OBGYN CLINIC | Age: 40
End: 2020-07-13

## 2020-07-13 NOTE — TELEPHONE ENCOUNTER
Called with fasting blood sugars, which I scanned into chart and reported to DR Jen Escalante, who increased Lantus insulin at night to 14 units. Jimi called , informed and verbalized understanding.

## 2020-07-14 ENCOUNTER — TELEPHONE (OUTPATIENT)
Dept: OBGYN CLINIC | Age: 40
End: 2020-07-14

## 2020-07-16 ENCOUNTER — TELEPHONE (OUTPATIENT)
Dept: OBGYN CLINIC | Age: 40
End: 2020-07-16

## 2020-07-16 ENCOUNTER — ROUTINE PRENATAL (OUTPATIENT)
Dept: OBGYN CLINIC | Age: 40
End: 2020-07-16
Payer: COMMERCIAL

## 2020-07-16 VITALS
WEIGHT: 293 LBS | TEMPERATURE: 97 F | BODY MASS INDEX: 53.9 KG/M2 | DIASTOLIC BLOOD PRESSURE: 73 MMHG | SYSTOLIC BLOOD PRESSURE: 115 MMHG | HEART RATE: 89 BPM

## 2020-07-16 PROBLEM — Z3A.35 35 WEEKS GESTATION OF PREGNANCY: Status: ACTIVE | Noted: 2020-07-16

## 2020-07-16 LAB
ACETONE URINE: NORMAL
GLUCOSE URINE, POC: NEGATIVE
KETONES, POC: NORMAL
PROTEIN UA: ABNORMAL

## 2020-07-16 PROCEDURE — 81002 URINALYSIS NONAUTO W/O SCOPE: CPT | Performed by: OBSTETRICS & GYNECOLOGY

## 2020-07-16 PROCEDURE — 76819 FETAL BIOPHYS PROFIL W/O NST: CPT | Performed by: OBSTETRICS & GYNECOLOGY

## 2020-07-16 PROCEDURE — 99213 OFFICE O/P EST LOW 20 MIN: CPT | Performed by: OBSTETRICS & GYNECOLOGY

## 2020-07-16 PROCEDURE — 76816 OB US FOLLOW-UP PER FETUS: CPT | Performed by: OBSTETRICS & GYNECOLOGY

## 2020-07-16 NOTE — PROGRESS NOTES
Reviewed diabetic log up Lantus to 18 units  C section date of July 27th!!!  Ketone trace  States baby is moving, denies bleeding, leakage of fluid or contractions  Do kick counts daily  Good to do same time of day for the same amount of time  Should have 10 kick /1 hour, if less: decrease in fetal movement call OB. If not able to get OB, go directly to L&D due to decreased fetal movement  Call your obstetrician for:    Bleeding, leakage of fluid, abdominal tenderness, headaches, burred vision or  epigastric pain or decreased fetal movement or increased in urinary frequency.    Call if any questions or concerns

## 2020-07-16 NOTE — LETTER
[facility] FETAL MEDICINE  [address] [facility]. [address] [address]   Dept: [phone]  Loc: [phone]  Radha Calhoun MD                                           [facility] Consultation Letter     20     Stacey Kilgore MD     Re: Patient: Isidro Cash  YOB: 1980    MR Number: [id number] Date of Visit: 2020     Dear Dr. Christina Maldonado had the pleasure of seeing your patient, Isidro Cash, in consultation in the [facility] Fetal Medicine Department of [employer]. DIABETES AND PREGNANCY PROGRAM COMANAGEMENT    Referring/Requesting Provider: Stacey Kilgore MD   PCP: MARY Simms - RASHMI    CHIEF COMPLAINT: GDMA2    HISTORY OF PRESENT ILLNESS:   Jonatan Ross is a 47 y.o. Berniece Baldy at 35w3d with Bianca Harrison on Metformin and Insulin now at night, increased to 18 units today. Blood glucose record was reviewed. Hyperglycemia is present with about 50% of fastings, better since starting the insulin. She is in good spirits and excited for delivery which is scheduled for . FOB is here today as well no questions.      OB History    Para Term  AB Living   3 2 2     2   SAB TAB Ectopic Molar Multiple Live Births             2      # Outcome Date GA Lbr [place]/2nd Weight Sex Delivery Anes PTL Lv   3 Current            2 Term 08 40w0d  6 lb 10 oz (3.005 kg) M Vag-Spont  N SHEILA   1 Term 03 40w0d  7 lb 10 oz (3.459 kg) M Vag-Spont  N SHEILA     a  Past Medical History:   Diagnosis Date    Abnormal Pap smear of cervix     CAD (coronary artery disease)     SVT at time    Chronic back pain     Degenerative disc disease at L5-S1 level     Depression     Edema     Gestational diabetes mellitus (GDM) in third trimester controlled on oral hypoglycemic drug 6/10/2020    Hypertension     with just this pregnancies    Rh incompatibility     Rh negative      Thyroid disease        Past Surgical History:   Procedure Laterality Date    BACK SURGERY      LEEP  VEIN SURGERY Bilateral     stents       Current Outpatient Medications   Medication Sig Dispense Refill    enoxaparin (LOVENOX) 40 MG/0.4ML injection Inject 0.4 mLs into the skin 2 times daily for 30 doses 30 Syringe 3    levothyroxine (SYNTHROID) 50 MCG tablet take 1 tablet by mouth once daily 30 tablet 2    metFORMIN (GLUCOPHAGE) 500 MG tablet Take 1 tablet by mouth 4 times daily (before meals and nightly) 500 mg with meals tid  Then 1000 mg @ night 120 tablet 0    Prenatal Vit-Fe Fumarate-FA (PRENATAL VITAMINS PO) Take 1 tablet by mouth daily      labetalol (NORMODYNE) 100 MG tablet Take 100 mg by mouth daily      Crisaborole (EUCRISA) 2 % OINT Apply 1 applicator topically 2 times daily 1 Tube 0    busPIRone (BUSPAR) 15 MG tablet Take 15 mg by mouth daily 14 tablet 0    aspirin 81 MG tablet Take 81 mg by mouth daily      insulin NPH (HUMULIN N) 100 UNIT/ML injection vial Inject at hs prior to bedtime with snack 10-20 units (Patient taking differently: 14 Units nightly Inject at hs prior to bedtime with snack 10-20 units) 1 vial 2    Insulin Syringes, Disposable, U-100 1 ML MISC 4 times daily. 100 each 2     No current facility-administered medications for this visit.          No Known Allergies    PHYSICAL EXAM:  VITAL SIGNS: /73   Pulse 89   Temp 97 °F (36.1 °C) (Temporal)   Wt (!) 397 lb 6.4 oz (180.3 kg)   BMI 53.90 kg/m²   AAOx3, NAD    IMAGING: see report continued breech presentation, anterior placenta and BPP 8/8 with normal growth at the 81st percentile today       IMPRESSION AND RECOMMENDATIONS:  Dora Garcia is a 47 y.o.  at 30w2d with GDMA2  Patient Active Problem List    Diagnosis Date Noted    35 weeks gestation of pregnancy 2020    Normal growth and  testing today      Bilateral edema of lower extremity 2020    Gestational diabetes mellitus (GDM) in third trimester controlled on oral hypoglycemic drug 06/10/2020     Overview Note: Obesity complicating pregnancy is associated with increased maternal and fetal risks including: gestational diabetes, hypertensive disorders, iatrogenic  delivery, dysfunctional labor, postterm pregnancy, large for gestational age infant, shoulder dystocia, obstructive sleep apnea, postpartum hemorrhage, stillbirth, fetal anomalies,  delivery and postcesarean complications. ? Nutrition consultation is recommended. ? Reviewed diet, exercise, and weight gain. [place] of medicine recommend weight gain in pregnancy: Obese (all classes) > 30:  total weight gain 11-20 lbs, 0.5 lb per week in the second and third trimester. ? Low dose aspirin is recommended. ? Anesthesia consultation  ? Plan for adequate equipment for delivery that can accommodate maternal weight   ? After  delivery, provide adequate thromboprophylaxis   ? If  delivery, surgical technique needs to be modified for adequate exposure and postpartum care should be modified to reduce the risk of obesity-associated complications  ? Lactation consultation and encourage breastfeeding  ? I recommended an intrauterine contraception (Mirena) due to efficacy and protective affect against uterine cancer  ? Gastric bypass should be considered postpartum for this patient with multiple comorbidities           Status post insertion of iliac artery stent 04/10/2020     Overview Note:     Patient reports that she had bilateral iliac stents placed due to peripheral vascular disease in 2016. She currently takes low dose aspirin and Lovenox. Referred to Vascular Surgery and no additional recommendations at this time. Postpartum weight loss of 50 lbs and follow-up planned for December. Recommend PP heparin x 6 weeks  Maternal echocardiogram is also recommended given significant peripheral vascular disease, obesity and longstanding hypertension. Jimi reports she had a normal echo in the past and declines repeat echo this pregnancy. Open today to referral 20             Proteinuria affecting pregnancy 04/10/2020     Overview Note:     24 hour urine protein performed- 474 mg. She has recurrent UTI- normal renal ultrasound  Cr 1.14  The increased risks were discussed including worsening renal function, need for dialysis, hypertensive disorders. Nephrology consultation is recommended.  Elevated white blood cell count 04/10/2020     Overview Note:     Patient had recent elevated WBC count of 17.1. She reports that this was  Not during UTI or other infection. Dr. Enedina Swan has referred to Hematology. Did not keep appointment   Await their recommendations.  Hypertension affecting pregnancy, antepartum 04/10/2020     Overview Note:     I discussed with the patient concerns regarding hypertension in pregnancy and the increased risk of adverse pregnancy outcomes including but not limited to fetal growth restriction, placental abruption, prematurity and increased risk of stillbirth. I discussed with her that there is also an increased risk of developing superimposed preeclampsia. I also discussed with the patient the goals of hypertension control during pregnancy and medications that can be safely used during pregnancy. We recommend increased monitoring with more frequent visits, serial ultrasounds to monitor growth and  testing starting no later than 32 weeks gestation. I also recommended that she have baseline urine protein creatinine ratio to evaluate her renal status as well as CBC and chemistry panel. These labs should be repeated each trimester or more frequently as clinically indicated. She should also have an baseline EKG. If there are abnormalities in the EKG she may need to have an echo of her heart.  In addition, the [place] [employer] (2014) has recommended that low-dose aspirin 81 mg be initiated between 12 and 28 weeks of gestation to reduce the occurrence of preeclampsia,  birth, and fetal growth restriction in women at increased risk for preeclampsia. A thorough discussion of risk benefits and alternatives of management was performed with the patient. She stated understanding. BP normal today. Continue labetalol 100 mg daily          Hypothyroid in pregnancy, antepartum 04/10/2020     Overview Note:     - MFM consult completed  - Continue Synthroid 50 mcg daily.    - OB to order TSH every trimester, titrate synthroid to maintain normal TSH      Smoking 04/10/2020     Overview Note:     Strongly recommended smoking cessation. Discussed risks including death. 1. Scheduled  at 37 weeks GA reviewed when to take her last dose of Lovenox and ASA daily, stop 24 hour prior to scheduled case   2. Continued aggressive FSBS monitoring and would increase her night time insulin as needed prior to delivery. She will be returning in one week for review  3. Nicotine patch while inpatient given tobacco usage   4. SCD intraoperatively and postoperatively  5. Restart Lovenox 40mg daily postpartum within 12 hours of delivery if bleeding is stable for total 6 weeks postpartum and defer to Dr. Candida Lara to manage postpartum Lovenox. She was not on any medications for VTE prior to pregnancy. Consideration of hematology consult as previously recommended given strong family history of concerns, defer to Dr. Teena Fajardo regarding this as well. 6. Follow FSBS after delivery and check fasting and one hour postprandial if fasting > 120 or postprandial > 180 consideration of going back on Metformin 500mg BID   7. Will need 2 hour GTT if off all medications at 6 weeks postpartum       The total patient time of the visit was 15 minutes, of which was greater than 50% of the time was spent counseling and coordinating care. Thank you for allowing us to participate in the care of your patient.  Should you or the family have any questions or concerns, please do not hesitate to contact us.     Sincerely,    Dl Rollins MD

## 2020-07-16 NOTE — PATIENT INSTRUCTIONS
Baby[employer] K[employer]: Care Instructions. \"     If you do not have an account, please click on the \"Sign Up Now\" link. Current as of: 2020               Content Version: 12.5   [email or web address]. Care instructions adapted under license by [facility] ([facility]). If you have questions about a medical condition or this instruction, always ask your healthcare professional. [place] [address] any warranty or liability for your use of this information. Patient Education        Weeks 34 to 39 of Your Pregnancy: Care Instructions  Your Care Instructions     By now, your baby and your belly have grown quite large. It is almost time to give birth. Your baby's lungs are almost ready to breathe air. The bones in your baby's head are now firm enough to protect it, but soft enough to move down through the birth canal.  You may feel excited, happy, anxious, or scared. You may wonder how you will know if you are in labor or what to expect during labor. Try to be flexible in your expectations of the birth. Because each birth is different, there is no way to know exactly what childbirth will be like for you. This care sheet will help you know what to expect and how to prepare. This may make your childbirth easier. If you haven't already had the Tdap shot during this pregnancy, talk to your doctor about getting it. It will help protect your  against pertussis infection. In the 36th week, most women have a test for group B streptococcus (GBS). GBS is a common bacteria that can live in the vagina and rectum. It can make your baby sick after birth. If you test positive, you will get antibiotics during labor. The medicine will keep your baby from getting the bacteria. Follow-up care is a key part of your treatment and safety. Be sure to make and go to all appointments, and call your doctor if you are having problems.  It's also a good idea to know your test results and keep a list of the medicines you take. How can you care for yourself at home? Learn about pain relief choices  · Pain is different for every woman. Talk with your doctor about your feelings about pain. · You can choose from several types of pain relief. These include medicine or breathing techniques, as well as comfort measures. You can use more than one option. · If you choose to have pain medicine during labor, talk to your doctor about your options. Some medicines lower anxiety and help with some of the pain. Others make your lower body numb so that you won't feel pain. · Be sure to tell your doctor about your pain medicine choice before you start labor or very early in your labor. You may be able to change your mind as labor progresses. · Rarely, a woman is put to sleep by medicine given through a mask or an IV. Labor and delivery  · The first stage of labor has three parts: early, active, and transition. ? Most women have early labor at home. You can stay busy or rest, eat light snacks, drink clear fluids, and start counting contractions. ? When talking during a contraction gets hard, you may be moving to active labor. During active labor, you should head for the hospital if you are not there already. ? You are in active labor when contractions come every 3 to 4 minutes and last about 60 seconds. Your cervix is opening more rapidly. ? If your water breaks, contractions will come faster and stronger. ? During transition, your cervix is stretching, and contractions are coming more rapidly. ? You may want to push, but your cervix might not be ready. Your doctor will tell you when to push. · The second stage starts when your cervix is completely opened and you are ready to push. ? Contractions are very strong to push the baby down the birth canal.  ? You will feel the urge to push. You may feel like you need to have a bowel movement. ? You may be coached to push with contractions.  These contractions will be very strong, but you will not have them as often. You can get a little rest between contractions. ? You may be emotional and irritable. You may not be aware of what is going on around you.  ? One last push, and your baby is born. · The third stage is when a few more contractions push out the placenta. This may take 30 minutes or less. · The fourth stage is the welcome recovery. You may feel overwhelmed with emotions and exhausted but alert. This is a good time to start breastfeeding. Where can you learn more? Go to https://[employer] Solutionspepiceweb.[employer]. org and sign in to your [employer] account. Enter V924 in the [employer] box to learn more about \"Weeks 34 to 36 of Your Pregnancy: Care Instructions. \"     If you do not have an account, please click on the \"Sign Up Now\" link. Current as of: February 11, 2020               Content Version: 12.5  © 9951-4409 [employer]. Care instructions adapted under license by [facility] ([facility]). If you have questions about a medical condition or this instruction, always ask your healthcare professional. [place] [address] any warranty or liability for your use of this information. Patient Education        Learning About When to Call Your Doctor During Pregnancy (After 20 Weeks)  Your Care Instructions  It's common to have concerns about what might be a problem during pregnancy. Although most pregnant women don't have any serious problems, it's important to know when to call your doctor if you have certain symptoms or signs of labor. These are general suggestions. Your doctor may give you some more information about when to call. When to call your doctor (after 20 weeks)  Call 911 anytime you think you may need emergency care. For example, call if:  · You have severe vaginal bleeding. · You have sudden, severe pain in your belly. · You passed out (lost consciousness). · You have a seizure. · You see or feel the umbilical cord.   · You think you are about to deliver your baby and can't make it safely to the hospital.  Call your doctor now or seek immediate medical care if:  · You have vaginal bleeding. · You have belly pain. · You have a fever. · You have symptoms of preeclampsia, such as:  ? Sudden swelling of your face, hands, or feet. ? New vision problems (such as dimness, blurring, or seeing spots). ? A severe headache. · You have a sudden release of fluid from your vagina. (You think your water broke.)  · You think that you may be in labor. This means that you've had at least 6 contractions in an hour. · You notice that your baby has stopped moving or is moving much less than normal.  · You have symptoms of a urinary tract infection. These may include:  ? Pain or burning when you urinate. ? A frequent need to urinate without being able to pass much urine. ? Pain in the flank, which is just below the rib cage and above the waist on either side of the back. ? Blood in your urine. Watch closely for changes in your health, and be sure to contact your doctor if:  · You have vaginal discharge that smells bad. · You have skin changes, such as:  ? A rash. ? Itching. ? Yellow color to your skin. · You have other concerns about your pregnancy. If you have labor signs at 37 weeks or more  If you have signs of labor at 37 weeks or more, your doctor may tell you to call when your labor becomes more active. Symptoms of active labor include:  · Contractions that are regular. · Contractions that are less than 5 minutes apart. · Contractions that are hard to talk through. Follow-up care is a key part of your treatment and safety. Be sure to make and go to all appointments, and call your doctor if you are having problems. It's also a good idea to know your test results and keep a list of the medicines you take. Where can you learn more? Go to https://lamar.[employer]. org and sign in to your [employer] account.  Enter  in the Search Health Information box to learn more about \"Learning About When to Call Your Doctor During Pregnancy (After 20 Weeks). \"     If you do not have an account, please click on the \"Sign Up Now\" link. Current as of: February 11, 2020               Content Version: 12.5  © 3021-6274 [employer]. Care instructions adapted under license by [facility] ([facility]). If you have questions about a medical condition or this instruction, always ask your healthcare professional. [address] any warranty or liability for your use of this information.

## 2020-07-16 NOTE — PROGRESS NOTES
DIABETES AND PREGNANCY PROGRAM COMANAGEMENT    Referring/Requesting Provider: Conor Marrero MD   PCP: Nelly Rao, APRN - CNP    CHIEF COMPLAINT: GDMA2    HISTORY OF PRESENT ILLNESS:   Salma Long is a 47 y.o. Lyndsay Sam at 35w3d with Amaryllis Sermons on Metformin and Insulin now at night, increased to 18 units today. Blood glucose record was reviewed. Hyperglycemia is present with about 50% of fastings, better since starting the insulin. She is in good spirits and excited for delivery which is scheduled for . FOB is here today as well no questions.      OB History    Para Term  AB Living   3 2 2     2   SAB TAB Ectopic Molar Multiple Live Births             2      # Outcome Date GA Lbr [place]/2nd Weight Sex Delivery Anes PTL Lv   3 Current            2 Term 08 40w0d  6 lb 10 oz (3.005 kg) M Vag-Spont  N SHEILA   1 Term 03 40w0d  7 lb 10 oz (3.459 kg) M Vag-Spont  N SHEILA     a  Past Medical History:   Diagnosis Date    Abnormal Pap smear of cervix     CAD (coronary artery disease)     SVT at time    Chronic back pain     Degenerative disc disease at L5-S1 level     Depression     Edema     Gestational diabetes mellitus (GDM) in third trimester controlled on oral hypoglycemic drug 6/10/2020    Hypertension     with just this pregnancies    Rh incompatibility     Rh negative      Thyroid disease        Past Surgical History:   Procedure Laterality Date    BACK SURGERY      LEEP      VEIN SURGERY Bilateral     stents       Current Outpatient Medications   Medication Sig Dispense Refill    enoxaparin (LOVENOX) 40 MG/0.4ML injection Inject 0.4 mLs into the skin 2 times daily for 30 doses 30 Syringe 3    levothyroxine (SYNTHROID) 50 MCG tablet take 1 tablet by mouth once daily 30 tablet 2    metFORMIN (GLUCOPHAGE) 500 MG tablet Take 1 tablet by mouth 4 times daily (before meals and nightly) 500 mg with meals tid  Then 1000 mg @ night 120 tablet 0    Prenatal Vit-Fe Fumarate-FA (PRENATAL VITAMINS PO) Take 1 tablet by mouth daily      labetalol (NORMODYNE) 100 MG tablet Take 100 mg by mouth daily      Crisaborole (EUCRISA) 2 % OINT Apply 1 applicator topically 2 times daily 1 Tube 0    busPIRone (BUSPAR) 15 MG tablet Take 15 mg by mouth daily 14 tablet 0    aspirin 81 MG tablet Take 81 mg by mouth daily      insulin NPH (HUMULIN N) 100 UNIT/ML injection vial Inject at hs prior to bedtime with snack 10-20 units (Patient taking differently: 14 Units nightly Inject at hs prior to bedtime with snack 10-20 units) 1 vial 2    Insulin Syringes, Disposable, U-100 1 ML MISC 4 times daily. 100 each 2     No current facility-administered medications for this visit. No Known Allergies    PHYSICAL EXAM:  VITAL SIGNS: /73   Pulse 89   Temp 97 °F (36.1 °C) (Temporal)   Wt (!) 397 lb 6.4 oz (180.3 kg)   BMI 53.90 kg/m²   AAOx3, NAD    IMAGING: see report continued breech presentation, anterior placenta and BPP  with normal growth at the 81st percentile today       IMPRESSION AND RECOMMENDATIONS:  Kelly Domínguez is a 47 y.o.  at 30w2d with GDMA2  Patient Active Problem List    Diagnosis Date Noted    35 weeks gestation of pregnancy 2020    Normal growth and  testing today      Bilateral edema of lower extremity 2020    Gestational diabetes mellitus (GDM) in third trimester controlled on oral hypoglycemic drug 06/10/2020     Overview Note:     - Metformin taking regularly on 20 and reports good compliance!   - We reviewed the option of discontinuing Meformin and switching to insulin. Very reluctant, but understands the rationale behind why insulin may control fastings   - Her fasting BG are . - We reviewed the maximum dose of immediate release Metformin is 2550 mg per day. Her metformin is 500 mg. We reviewed the importance of taking 5 tabs per day and not to exceed this.  She will take 2 tabs (1000 mg at bedtime) and 1 tab before breakfast, lunch and dinner for a total daily dose of 2500mg.   - Insulin at night recommended to continue at 18 units at night, will call if continued fasting value elevation. continue Metformin given her current GA of 34 1/7 weeks GA on 20   - Post partum OGTT is recommended at 4-6 weeks PP and I also recommend checking fasting and one hour pp after  if fasting > 120 and overall postprandial > 180 needs to go back on Metformin 500mg BID   - We reviewed the benefits of breastfeeding.  - Recommend:    1. US every 4 weeks for growth   2.  fetal testing twice weekly after 32 weeks   3. Deliver at 40 to 38 weeks secondary to chronic hypertension on Labetalol, with other multiple medical co-morbidities         AMA (advanced maternal age) multigravida 35+ 04/10/2020     Overview Note:     There is an approximately 3% background chance of having a child with a major birth defect, intellectual disability, or genetic disorder. The increasing chance of numerical chromosomal abnormalities with increasing maternal age was reviewed. Based on her age at delivery, there is an approximately 1% chance for this pregnancy to have a numerical chromosome abnormality. Low risk cell free DNA aneuploidy screening. Declined invasive testing and additional genetic counseling.  Obesity affecting pregnancy 04/10/2020     Overview Note:     Obesity complicating pregnancy is associated with increased maternal and fetal risks including: gestational diabetes, hypertensive disorders, iatrogenic  delivery, dysfunctional labor, postterm pregnancy, large for gestational age infant, shoulder dystocia, obstructive sleep apnea, postpartum hemorrhage, stillbirth, fetal anomalies,  delivery and postcesarean complications.  Nutrition consultation is recommended.  Reviewed diet, exercise, and weight gain.   [place] of [facility] recommend weight gain in pregnancy: Obese (all classes) > 30:  total weight gain 11-20 lbs, 0.5 lb per week in the second and third trimester.  Low dose aspirin is recommended.  Anesthesia consultation   Plan for adequate equipment for delivery that can accommodate maternal weight    After  delivery, provide adequate thromboprophylaxis    If  delivery, surgical technique needs to be modified for adequate exposure and postpartum care should be modified to reduce the risk of obesity-associated complications   Lactation consultation and encourage breastfeeding   I recommended an intrauterine contraception (Mirena) due to efficacy and protective affect against uterine cancer   Gastric bypass should be considered postpartum for this patient with multiple comorbidities           Status post insertion of iliac artery stent 04/10/2020     Overview Note:     Patient reports that she had bilateral iliac stents placed due to peripheral vascular disease in 2016. She currently takes low dose aspirin and Lovenox. Referred to Vascular Surgery and no additional recommendations at this time. Postpartum weight loss of 50 lbs and follow-up planned for December. Recommend PP heparin x 6 weeks  Maternal echocardiogram is also recommended given significant peripheral vascular disease, obesity and longstanding hypertension. Jimi reports she had a normal echo in the past and declines repeat echo this pregnancy. Open today to referral 20             Proteinuria affecting pregnancy 04/10/2020     Overview Note:     24 hour urine protein performed- 474 mg. She has recurrent UTI- normal renal ultrasound  Cr 1.14  The increased risks were discussed including worsening renal function, need for dialysis, hypertensive disorders. Nephrology consultation is recommended.  Elevated white blood cell count 04/10/2020     Overview Note:     Patient had recent elevated WBC count of 17.1. She reports that this was  Not during UTI or other infection. Dr. Jorge Luis Wheeler has referred to Hematology.   Did not keep appointment   Await their recommendations.  Hypertension affecting pregnancy, antepartum 04/10/2020     Overview Note:     I discussed with the patient concerns regarding hypertension in pregnancy and the increased risk of adverse pregnancy outcomes including but not limited to fetal growth restriction, placental abruption, prematurity and increased risk of stillbirth. I discussed with her that there is also an increased risk of developing superimposed preeclampsia. I also discussed with the patient the goals of hypertension control during pregnancy and medications that can be safely used during pregnancy. We recommend increased monitoring with more frequent visits, serial ultrasounds to monitor growth and  testing starting no later than 32 weeks gestation. I also recommended that she have baseline urine protein creatinine ratio to evaluate her renal status as well as CBC and chemistry panel. These labs should be repeated each trimester or more frequently as clinically indicated. She should also have an baseline EKG. If there are abnormalities in the EKG she may need to have an echo of her heart. In addition, the [facility] International Paper (2014) has recommended that low-dose aspirin 81 mg be initiated between 12 and 28 weeks of gestation to reduce the occurrence of preeclampsia,  birth, and fetal growth restriction in women at increased risk for preeclampsia. A thorough discussion of risk benefits and alternatives of management was performed with the patient. She stated understanding. BP normal today. Continue labetalol 100 mg daily          Hypothyroid in pregnancy, antepartum 04/10/2020     Overview Note:     - MFM consult completed  - Continue Synthroid 50 mcg daily.    - OB to order TSH every trimester, titrate synthroid to maintain normal TSH      Smoking 04/10/2020     Overview Note:     Strongly recommended smoking cessation.   Discussed risks including death.        1. Scheduled  at 37 weeks GA reviewed when to take her last dose of Lovenox and ASA daily, stop 24 hour prior to scheduled case   2. Continued aggressive FSBS monitoring and would increase her night time insulin as needed prior to delivery. She will be returning in one week for review  3. Nicotine patch while inpatient given tobacco usage   4. SCD intraoperatively and postoperatively  5. Restart Lovenox 40mg daily postpartum within 12 hours of delivery if bleeding is stable for total 6 weeks postpartum and defer to Dr. Pollo Vasquez to manage postpartum Lovenox. She was not on any medications for VTE prior to pregnancy. Consideration of hematology consult as previously recommended given strong family history of concerns, defer to Dr. Eliz Armas regarding this as well. 6. Follow FSBS after delivery and check fasting and one hour postprandial if fasting > 120 or postprandial > 180 consideration of going back on Metformin 500mg BID   7. Will need 2 hour GTT if off all medications at 6 weeks postpartum       The total patient time of the visit was 15 minutes, of which was greater than 50% of the time was spent counseling and coordinating care.

## 2020-07-20 ENCOUNTER — ROUTINE PRENATAL (OUTPATIENT)
Dept: OBGYN CLINIC | Age: 40
End: 2020-07-20
Payer: COMMERCIAL

## 2020-07-20 VITALS
SYSTOLIC BLOOD PRESSURE: 115 MMHG | WEIGHT: 293 LBS | TEMPERATURE: 96.4 F | BODY MASS INDEX: 54.28 KG/M2 | HEART RATE: 92 BPM | DIASTOLIC BLOOD PRESSURE: 72 MMHG

## 2020-07-20 PROBLEM — Z3A.36 36 WEEKS GESTATION OF PREGNANCY: Status: ACTIVE | Noted: 2020-07-20

## 2020-07-20 LAB
ACETONE URINE: NORMAL
GLUCOSE URINE, POC: NEGATIVE
KETONES, POC: NEGATIVE
PROTEIN UA: NEGATIVE

## 2020-07-20 PROCEDURE — 81002 URINALYSIS NONAUTO W/O SCOPE: CPT | Performed by: OBSTETRICS & GYNECOLOGY

## 2020-07-20 PROCEDURE — 99213 OFFICE O/P EST LOW 20 MIN: CPT | Performed by: OBSTETRICS & GYNECOLOGY

## 2020-07-20 PROCEDURE — 76819 FETAL BIOPHYS PROFIL W/O NST: CPT | Performed by: OBSTETRICS & GYNECOLOGY

## 2020-07-20 NOTE — LETTER
[facility]  [address] [address]   Dept: [phone]  Loc: [phone]  Jacob Romano MD                                           [facility] Consultation Letter     20         Re: Patient: Reina Hartley  YOB: 1980    MR Number: [id number] Date of Visit: 2020     Dear Dr. Nely Montero,      I had the pleasure of seeing your patient, Reina Hartley, in consultation in the [facility] Fetal Medicine Department of [facility] ([facility]). DIABETES AND PREGNANCY PROGRAM COMANAGEMENT    Referring/Requesting Provider: Nely Montero  PCP: MARY Plummer - CNP    CHIEF COMPLAINT: GDMA2    HISTORY OF PRESENT ILLNESS:   Hank Vargas is a 47 y.o. Eran Sat at 36w0d with GDMA2 on NPH and Metformin. Blood glucose record was reviewed. Hyperglycemia is present > 50% of fasting values. Remainder of values are normal.  She denies complaints today and is overall feeling well. Fetus is active.       OB History    Para Term  AB Living   3 2 2     2   SAB TAB Ectopic Molar Multiple Live Births             2      # Outcome Date GA Lbr [place]/2nd Weight Sex Delivery Anes PTL Lv   3 Current            2 Term 08 40w0d  6 lb 10 oz (3.005 kg) M Vag-Spont  N SHEILA   1 Term 03 40w0d  7 lb 10 oz (3.459 kg) M Vag-Spont  N SHEILA       Past Medical History:   Diagnosis Date    Abnormal Pap smear of cervix     CAD (coronary artery disease)     SVT at time    Chronic back pain     Degenerative disc disease at L5-S1 level     Depression     Edema     Gestational diabetes mellitus (GDM) in third trimester controlled on oral hypoglycemic drug 6/10/2020    Hypertension     with just this pregnancies    Rh incompatibility     Rh sensitized     Thyroid disease        Past Surgical History:   Procedure Laterality Date    BACK SURGERY      LEEP      VEIN SURGERY Bilateral     stents       Medications:  See list    No Known Allergies    PHYSICAL EXAM: VITAL SIGNS: /72   Pulse 92   Temp 96.4 °F (35.8 °C) (Temporal)   Wt (!) 400 lb 3.2 oz (181.5 kg)   BMI 54.28 kg/m²   AAOx3, NAD    IMAGING:  BPP 8/8, Breech    IMPRESSION AND RECOMMENDATIONS:  Jenelle Hammans is a   Patient Active Problem List    Diagnosis Date Noted    36 weeks gestation of pregnancy 2020    Bilateral edema of lower extremity 2020    Gestational diabetes mellitus (GDM) in third trimester controlled on oral hypoglycemic drug 06/10/2020     Overview Note:     - Continue Metformin  - Increase NPH to 20 units qhs  - Post partum OGTT is recommended at 4-6 weeks PP  - We reviewed the benefits of breastfeeding.  - Recommend:    1. US every 4 weeks for growth   2.  fetal testing twice weekly after 32 weeks   3. Deliver at 37 weeks secondary to diabetes (improved but still suboptimal control), chronic hypertension on Labetalol, with other multiple medical co-morbidities         AMA (advanced maternal age) multigravida 35+ 04/10/2020     Overview Note:     There is an approximately 3% background chance of having a child with a major birth defect, intellectual disability, or genetic disorder. The increasing chance of numerical chromosomal abnormalities with increasing maternal age was reviewed. Based on her age at delivery, there is an approximately 1% chance for this pregnancy to have a numerical chromosome abnormality. Low risk cell free DNA aneuploidy screening. Declined invasive testing and additional genetic counseling.         Obesity affecting pregnancy 04/10/2020     Overview Note:     Obesity complicating pregnancy is associated with increased maternal and fetal risks including: gestational diabetes, hypertensive disorders, iatrogenic  delivery, dysfunctional labor, postterm pregnancy, large for gestational age infant, shoulder dystocia, obstructive sleep apnea, postpartum hemorrhage, stillbirth, fetal anomalies,  delivery and postcesarean complications. ? Nutrition consultation is recommended. ? Reviewed diet, exercise, and weight gain. [place] of medicine recommend weight gain in pregnancy: Obese (all classes) > 30:  total weight gain 11-20 lbs, 0.5 lb per week in the second and third trimester. ? Low dose aspirin is recommended. ? Anesthesia consultation  ? Plan for adequate equipment for delivery that can accommodate maternal weight   ? After  delivery, provide adequate thromboprophylaxis   ? If  delivery, surgical technique needs to be modified for adequate exposure and postpartum care should be modified to reduce the risk of obesity-associated complications  ? Lactation consultation and encourage breastfeeding  ? I recommended an intrauterine contraception (Mirena) due to efficacy and protective affect against uterine cancer  ? Gastric bypass should be considered postpartum for this patient with multiple comorbidities             Status post insertion of iliac artery stent 04/10/2020     Overview Note:     Patient reports that she had bilateral iliac stents placed due to peripheral vascular disease in . She currently takes low dose aspirin and Lovenox. Referred to Vascular Surgery and no additional recommendations at this time. Postpartum weight loss of 50 lbs and follow-up planned for December. Recommend PP heparin x 6 weeks  Maternal echocardiogram is also recommended given significant peripheral vascular disease, obesity and longstanding hypertension. Jimi reports she had a normal echo in the past and declines repeat echo this pregnancy. Open today to referral 20             Proteinuria affecting pregnancy 04/10/2020     Overview Note:     24 hour urine protein performed- 474 mg. She has recurrent UTI- normal renal ultrasound  Cr 1.14  The increased risks were discussed including worsening renal function, need for dialysis, hypertensive disorders. Nephrology consultation is recommended.  Elevated white blood cell count 04/10/2020     Overview Note:     Patient had recent elevated WBC count of 17.1. She reports that this was  Not during UTI or other infection. Dr. Vanita West has referred to Hematology. Did not keep appointment   Await their recommendations.  Hypertension affecting pregnancy, antepartum 04/10/2020     Overview Note:     I discussed with the patient concerns regarding hypertension in pregnancy and the increased risk of adverse pregnancy outcomes including but not limited to fetal growth restriction, placental abruption, prematurity and increased risk of stillbirth. I discussed with her that there is also an increased risk of developing superimposed preeclampsia. I also discussed with the patient the goals of hypertension control during pregnancy and medications that can be safely used during pregnancy. We recommend increased monitoring with more frequent visits, serial ultrasounds to monitor growth and  testing starting no later than 32 weeks gestation. I also recommended that she have baseline urine protein creatinine ratio to evaluate her renal status as well as CBC and chemistry panel. These labs should be repeated each trimester or more frequently as clinically indicated. She should also have an baseline EKG. If there are abnormalities in the EKG she may need to have an echo of her heart. In addition, the [place] [employer] Paper (2014) has recommended that low-dose aspirin 81 mg be initiated between 12 and 28 weeks of gestation to reduce the occurrence of preeclampsia,  birth, and fetal growth restriction in women at increased risk for preeclampsia. A thorough discussion of risk benefits and alternatives of management was performed with the patient. She stated understanding. BP normal today. Continue labetalol 100 mg daily    Preeclampsia precautions reviewed.  Hypothyroid in pregnancy, antepartum 04/10/2020     Overview Note:     - MFM consult completed  - Continue Synthroid 50 mcg daily.    - OB to order TSH every trimester, titrate synthroid to maintain normal TSH     Patient asymptomatic      Smoking 04/10/2020     Overview Note:     Strongly recommended smoking cessation. Discussed risks including death. The total patient time of the visit was 15 minutes, of which was greater than 50% of the time was spent counseling and coordinating care. Thank you for allowing us to participate in the care of your patient. Should you or the family have any questions or concerns, please do not hesitate to contact us.     Sincerely,    Keiko Shipman MD

## 2020-07-20 NOTE — PATIENT INSTRUCTIONS
Patient Education        Counting Your Baby's Kicks: Care Instructions  Your Care Instructions     Counting your baby's kicks is one way your doctor can tell that your baby is healthy. Most women--especially in a first pregnancy--feel their baby move for the first time between 16 and 22 weeks. The movement may feel like flutters rather than kicks. Your baby may move more at certain times of the day. When you are active, you may notice less kicking than when you are resting. At your prenatal visits, your doctor will ask whether the baby is active. In your last trimester, your doctor may ask you to count the number of times you feel your baby move. Follow-up care is a key part of your treatment and safety. Be sure to make and go to all appointments, and call your doctor if you are having problems. It's also a good idea to know your test results and keep a list of the medicines you take. How do you count fetal kicks? · A common method of checking your baby's movement is to count the number of kicks or moves you feel in 1 hour. Ten movements (such as kicks, flutters, or rolls) in 1 hour are normal. Some doctors suggest that you count in the morning until you get to 10 movements. Then you can quit for that day and start again the next day. · Pick your baby's most active time of day to count. This may be any time from morning to evening. · If you do not feel 10 movements in an hour, your baby may be sleeping. Wait for the next hour and count again. When should you call for help? Call your doctor now or seek immediate medical care if:  · You noticed that your baby has stopped moving or is moving much less than normal.  Watch closely for changes in your health, and be sure to contact your doctor if you have any problems. Where can you learn more? Go to https://chmckay.AppsFlyer. org and sign in to your [employer] account.  Enter F955 in the BiometryCloud box to learn more about \"Counting Your Baby's Kicks: Care Instructions. \"     If you do not have an account, please click on the \"Sign Up Now\" link. Current as of: February 11, 2020               Content Version: 12.5  © 2006-2020 Healthwise, Incorporated. Care instructions adapted under license by [facility] ([facility]). If you have questions about a medical condition or this instruction, always ask your healthcare professional. [place] [address] any warranty or liability for your use of this information. Patient Education        Week 37 of Your Pregnancy: Care Instructions  Your Care Instructions     You are near the end of your pregnancy--and you're probably pretty uncomfortable. It may be harder to walk around. Lying down probably isn't comfortable either. You may have trouble getting to sleep or staying asleep. Most women deliver their babies between 40 and 41 weeks. This is a good time to think about packing a bag for the hospital with items you'll need. Then you'll be ready when labor starts. Follow-up care is a key part of your treatment and safety. Be sure to make and go to all appointments, and call your doctor if you are having problems. It's also a good idea to know your test results and keep a list of the medicines you take. How can you care for yourself at home? Learn about breastfeeding  · Breastfeeding is best for your baby and good for you. · Breast milk has antibodies to help your baby fight infections. · Mothers who breastfeed often lose weight faster, because making milk burns calories. · Learning the best ways to hold your baby will make breastfeeding easier. · Let your partner bathe and diaper the baby to keep your partner from feeling left out. Snuggle together when you breastfeed. · You may want to learn how to use a breast pump and store your milk. · If you choose to bottle feed, make the feeding feel like breastfeeding so you can bond with your baby. Always hold your baby and the bottle.  Do not prop bottles or let your baby fall asleep with a bottle. Learn about crying  · It is common for babies to cry for 1 to 3 hours a day. Some cry more, some cry less. · Babies don't cry to make you upset or because you are a bad parent. · Crying is how your baby communicates. Your baby may be hungry; have gas; need a diaper change; or feel cold, warm, tired, lonely, or tense. Sometimes babies cry for unknown reasons. · If you respond to your baby's needs, he or she will learn to trust you. · Try to stay calm when your baby cries. Your baby may get more upset if he or she senses that you are upset. Know how to care for your   · Your baby's umbilical cord stump will drop off on its own, usually between 1 and 2 weeks. To care for your baby's umbilical cord area:  ? Clean the area at the bottom of the cord 2 or 3 times a day. ? Pay special attention to the area where the cord attaches to the skin. ? Keep the diaper folded below the cord. ? Use a damp washcloth or cotton ball to sponge bathe your baby until the stump has come off. · Your baby's first dark stool is called meconium. After the meconium is passed, your baby will develop his or her own bowel pattern. ? Some babies, especially  babies, have several bowel movements a day. Others have one or two a day, or one every 2 to 3 days. ?  babies often have loose, yellow stools. Formula-fed babies have more formed stools. ? If your baby's stools look like little pellets, he or she is constipated. After 2 days of constipation, call your baby's doctor. · If your baby will be circumcised, you can care for him at home. ? Gently rinse his penis with warm water after every diaper change. Do not try to remove the film that forms on the penis. This film will go away on its own. Pat dry. ? Put petroleum ointment, such as Vaseline, on the area of the diaper that will touch your baby's penis.  This will keep the diaper from sticking to your baby.  ? Ask the doctor about giving your baby acetaminophen (Tylenol) for pain. Where can you learn more? Go to https://chpepiceweb.EcoEridania. org and sign in to your [employer] account. Enter 68 [id number] in the StarNet Interactive box to learn more about \"Week 37 of Your Pregnancy: Care Instructions. \"     If you do not have an account, please click on the \"Sign Up Now\" link. Current as of: 2020               Content Version: 12.5   [employer]. Care instructions adapted under license by  [facility]. If you have questions about a medical condition or this instruction, always ask your healthcare professional. [place] [address] any warranty or liability for your use of this information. Patient Education        Weeks 34 to 39 of Your Pregnancy: Care Instructions  Your Care Instructions     By now, your baby and your belly have grown quite large. It is almost time to give birth. Your baby's lungs are almost ready to breathe air. The bones in your baby's head are now firm enough to protect it, but soft enough to move down through the birth canal.  You may feel excited, happy, anxious, or scared. You may wonder how you will know if you are in labor or what to expect during labor. Try to be flexible in your expectations of the birth. Because each birth is different, there is no way to know exactly what childbirth will be like for you. This care sheet will help you know what to expect and how to prepare. This may make your childbirth easier. If you haven't already had the Tdap shot during this pregnancy, talk to your doctor about getting it. It will help protect your  against pertussis infection. In the 36th week, most women have a test for group B streptococcus (GBS). GBS is a common bacteria that can live in the vagina and rectum. It can make your baby sick after birth. If you test positive, you will get antibiotics during labor.  The medicine will keep your baby from getting the bacteria. Follow-up care is a key part of your treatment and safety. Be sure to make and go to all appointments, and call your doctor if you are having problems. It's also a good idea to know your test results and keep a list of the medicines you take. How can you care for yourself at home? Learn about pain relief choices  · Pain is different for every woman. Talk with your doctor about your feelings about pain. · You can choose from several types of pain relief. These include medicine or breathing techniques, as well as comfort measures. You can use more than one option. · If you choose to have pain medicine during labor, talk to your doctor about your options. Some medicines lower anxiety and help with some of the pain. Others make your lower body numb so that you won't feel pain. · Be sure to tell your doctor about your pain medicine choice before you start labor or very early in your labor. You may be able to change your mind as labor progresses. · Rarely, a woman is put to sleep by medicine given through a mask or an IV. Labor and delivery  · The first stage of labor has three parts: early, active, and transition. ? Most women have early labor at home. You can stay busy or rest, eat light snacks, drink clear fluids, and start counting contractions. ? When talking during a contraction gets hard, you may be moving to active labor. During active labor, you should head for the hospital if you are not there already. ? You are in active labor when contractions come every 3 to 4 minutes and last about 60 seconds. Your cervix is opening more rapidly. ? If your water breaks, contractions will come faster and stronger. ? During transition, your cervix is stretching, and contractions are coming more rapidly. ? You may want to push, but your cervix might not be ready. Your doctor will tell you when to push.   · The second stage starts when your cervix is completely opened and you are ready to push. ? Contractions are very strong to push the baby down the birth canal.  ? You will feel the urge to push. You may feel like you need to have a bowel movement. ? You may be coached to push with contractions. These contractions will be very strong, but you will not have them as often. You can get a little rest between contractions. ? You may be emotional and irritable. You may not be aware of what is going on around you.  ? One last push, and your baby is born. · The third stage is when a few more contractions push out the placenta. This may take 30 minutes or less. · The fourth stage is the welcome recovery. You may feel overwhelmed with emotions and exhausted but alert. This is a good time to start breastfeeding. Where can you learn more? Go to https://chmckay.ClearSaleing. org and sign in to your [employer] account. Enter O039 in the Zoe Majeste box to learn more about \"Weeks 34 to 36 of Your Pregnancy: Care Instructions. \"     If you do not have an account, please click on the \"Sign Up Now\" link. Current as of: February 11, 2020               Content Version: 12.5  © 2261-3131 Healthwise, Synker. Care instructions adapted under license by [facility] ([facility]). If you have questions about a medical condition or this instruction, always ask your healthcare professional. [place] [address] any warranty or liability for your use of this information. Patient Education        Learning About When to Call Your Doctor During Pregnancy (After 20 Weeks)  Your Care Instructions  It's common to have concerns about what might be a problem during pregnancy. Although most pregnant women don't have any serious problems, it's important to know when to call your doctor if you have certain symptoms or signs of labor. These are general suggestions. Your doctor may give you some more information about when to call.   When to call your doctor (after 20 weeks)  Call 911 anytime you think you may need emergency care. For example, call if:  · You have severe vaginal bleeding. · You have sudden, severe pain in your belly. · You passed out (lost consciousness). · You have a seizure. · You see or feel the umbilical cord. · You think you are about to deliver your baby and can't make it safely to the hospital.  Call your doctor now or seek immediate medical care if:  · You have vaginal bleeding. · You have belly pain. · You have a fever. · You have symptoms of preeclampsia, such as:  ? Sudden swelling of your face, hands, or feet. ? New vision problems (such as dimness, blurring, or seeing spots). ? A severe headache. · You have a sudden release of fluid from your vagina. (You think your water broke.)  · You think that you may be in labor. This means that you've had at least 6 contractions in an hour. · You notice that your baby has stopped moving or is moving much less than normal.  · You have symptoms of a urinary tract infection. These may include:  ? Pain or burning when you urinate. ? A frequent need to urinate without being able to pass much urine. ? Pain in the flank, which is just below the rib cage and above the waist on either side of the back. ? Blood in your urine. Watch closely for changes in your health, and be sure to contact your doctor if:  · You have vaginal discharge that smells bad. · You have skin changes, such as:  ? A rash. ? Itching. ? Yellow color to your skin. · You have other concerns about your pregnancy. If you have labor signs at 37 weeks or more  If you have signs of labor at 37 weeks or more, your doctor may tell you to call when your labor becomes more active. Symptoms of active labor include:  · Contractions that are regular. · Contractions that are less than 5 minutes apart. · Contractions that are hard to talk through. Follow-up care is a key part of your treatment and safety.  Be sure to make and go to all appointments, and call your doctor if you are having problems. It's also a good idea to know your test results and keep a list of the medicines you take. Where can you learn more? Go to https://DataSiftpejoséeb.Green Vision Systems. org and sign in to your [email or web address] account. Enter  in the [facility] box to learn more about \"Learning About When to Call Your Doctor During Pregnancy (After 20 Weeks). \"     If you do not have an account, please click on the \"Sign Up Now\" link. Current as of: February 11, 2020               Content Version: 12.5  © 0239-0080 Healthwise, Incorporated. Care instructions adapted under license by [address] If you have questions about a medical condition or this instruction, always ask your healthcare professional. [address] any warranty or liability for your use of this information.

## 2020-07-20 NOTE — PROGRESS NOTES
States baby is active Denies bleeding leakage of fluid or contractions. Diabetic, with PVD  Ketone negative  Do kick counts daily  Good to do same time of day for the same amount of time  Should have 10 kick /1 hour, if less: decrease in fetal movement call OB. If not able to get OB, go directly to L&D due to decreased fetal movement  Call your obstetrician for:    Bleeding, leakage of fluid, abdominal tenderness, headaches, burred vision or  epigastric pain or decreased fetal movement or increased in urinary frequency.    Call if any questions or concerns
fetal growth restriction, placental abruption, prematurity and increased risk of stillbirth. I discussed with her that there is also an increased risk of developing superimposed preeclampsia. I also discussed with the patient the goals of hypertension control during pregnancy and medications that can be safely used during pregnancy. We recommend increased monitoring with more frequent visits, serial ultrasounds to monitor growth and  testing starting no later than 32 weeks gestation. I also recommended that she have baseline urine protein creatinine ratio to evaluate her renal status as well as CBC and chemistry panel. These labs should be repeated each trimester or more frequently as clinically indicated. She should also have an baseline EKG. If there are abnormalities in the EKG she may need to have an echo of her heart. In addition, the [place] International Paper (2014) has recommended that low-dose aspirin 81 mg be initiated between 12 and 28 weeks of gestation to reduce the occurrence of preeclampsia,  birth, and fetal growth restriction in women at increased risk for preeclampsia. A thorough discussion of risk benefits and alternatives of management was performed with the patient. She stated understanding. BP normal today. Continue labetalol 100 mg daily    Preeclampsia precautions reviewed.  Hypothyroid in pregnancy, antepartum 04/10/2020     Overview Note:     - MFM consult completed  - Continue Synthroid 50 mcg daily.    - OB to order TSH every trimester, titrate synthroid to maintain normal TSH     Patient asymptomatic      Smoking 04/10/2020     Overview Note:     Strongly recommended smoking cessation. Discussed risks including death. The total patient time of the visit was 15 minutes, of which was greater than 50% of the time was spent counseling and coordinating care.

## 2020-07-25 LAB

## 2020-08-10 ENCOUNTER — OFFICE VISIT (OUTPATIENT)
Dept: FAMILY MEDICINE CLINIC | Age: 40
End: 2020-08-10
Payer: COMMERCIAL

## 2020-08-10 VITALS
DIASTOLIC BLOOD PRESSURE: 84 MMHG | RESPIRATION RATE: 14 BRPM | SYSTOLIC BLOOD PRESSURE: 128 MMHG | BODY MASS INDEX: 39.68 KG/M2 | WEIGHT: 293 LBS | TEMPERATURE: 96.4 F | HEART RATE: 78 BPM | OXYGEN SATURATION: 99 % | HEIGHT: 72 IN

## 2020-08-10 PROCEDURE — 99214 OFFICE O/P EST MOD 30 MIN: CPT | Performed by: NURSE PRACTITIONER

## 2020-08-10 PROCEDURE — 4004F PT TOBACCO SCREEN RCVD TLK: CPT | Performed by: NURSE PRACTITIONER

## 2020-08-10 PROCEDURE — G8427 DOCREV CUR MEDS BY ELIG CLIN: HCPCS | Performed by: NURSE PRACTITIONER

## 2020-08-10 PROCEDURE — G8417 CALC BMI ABV UP PARAM F/U: HCPCS | Performed by: NURSE PRACTITIONER

## 2020-08-10 RX ORDER — FLUOXETINE 10 MG/1
10 CAPSULE ORAL DAILY
Qty: 30 CAPSULE | Refills: 0 | Status: SHIPPED
Start: 2020-08-10 | End: 2020-09-11 | Stop reason: DRUGHIGH

## 2020-08-10 RX ORDER — FUROSEMIDE 20 MG/1
20 TABLET ORAL EVERY OTHER DAY
Qty: 15 TABLET | Refills: 0 | Status: SHIPPED
Start: 2020-08-10 | End: 2020-11-20

## 2020-08-10 ASSESSMENT — ENCOUNTER SYMPTOMS
CONSTIPATION: 1
WHEEZING: 0
VOMITING: 0
DIARRHEA: 0
SHORTNESS OF BREATH: 0
NAUSEA: 0
COUGH: 0
CHEST TIGHTNESS: 0

## 2020-08-10 NOTE — PROGRESS NOTES
HPI:  Patient comes intoday for   Chief Complaint   Patient presents with    Rash     started with a rash on lower left leg which has spread all over leg. itches horribly. eucrisa not helping at all.  Swelling     Ceserean 2 weeks ago. both legs elevated Has been trying to keep feet elevated. is on doxycycline for incision infection and not sure if that caused swelling. Did see vascular at 7 months pregnant    . Complains of anxiety and depression. She was previously on prozac with good results but it was stopped during her last trimester of pregnancy. She would like to restart the prozac. Not breastfeeding. Complains of rash to left lower leg that is red and itchy. Rash started 2 months ago and is getting worse. Has used eucrisa and cortisone cream with no relief. Complains of swelling to lower legs. She does have stents in iliac veins. Did see vascular surgeon at 7 months pregnant. She did have a  2 weeks. Denies SOB. Prior to Visit Medications    Medication Sig Taking? Authorizing Provider   Enoxaparin Sodium (LOVENOX IJ) Inject as directed Yes Historical Provider, MD   mupirocin (BACTROBAN) 2 % ointment Apply topically 2 times daily. Yes MARY Hwathorne CNP   furosemide (LASIX) 20 MG tablet Take 1 tablet by mouth every other day Yes MARY Hawthorne CNP   FLUoxetine (PROZAC) 10 MG capsule Take 1 capsule by mouth daily Yes MARY Hawthorne CNP   levothyroxine (SYNTHROID) 50 MCG tablet take 1 tablet by mouth once daily Yes MARY Hawthorne CNP   Insulin Syringes, Disposable, U-100 1 ML MISC 4 times daily.  Yes Della Gould MD   metFORMIN (GLUCOPHAGE) 500 MG tablet Take 1 tablet by mouth 4 times daily (before meals and nightly) 500 mg with meals tid  Then 1000 mg @ night Yes Kelvin Pedroza,    Prenatal Vit-Fe Fumarate-FA (PRENATAL VITAMINS PO) Take 1 tablet by mouth daily Yes Historical Provider, MD   labetalol (NORMODYNE) 100 MG tablet Take 100 mg by mouth daily Yes Historical Provider, MD   Crisaborole (EUCRISA) 2 % OINT Apply 1 applicator topically 2 times daily Yes Татьяна Jones APRN - CNP   busPIRone (BUSPAR) 15 MG tablet Take 15 mg by mouth daily Yes MARY Shah - CNP   aspirin 81 MG tablet Take 81 mg by mouth daily Yes Historical Provider, MD   insulin NPH (HUMULIN N) 100 UNIT/ML injection vial 20 Units nightly Inject at hs prior to bedtime with snack 10-20 units  Patient not taking: Reported on 8/10/2020  Jacob Romano MD         No Known Allergies      Review of Systems  Review of Systems   Constitutional: Negative for chills, diaphoresis and fever. Respiratory: Negative for cough, chest tightness, shortness of breath and wheezing. Cardiovascular: Positive for leg swelling. Negative for chest pain and palpitations. Gastrointestinal: Positive for constipation. Negative for diarrhea, nausea and vomiting. Skin: Positive for rash. Neurological: Negative for weakness, light-headedness and headaches. Psychiatric/Behavioral: Positive for dysphoric mood. Negative for suicidal ideas. The patient is nervous/anxious. VS:  /84   Pulse 78   Temp 96.4 °F (35.8 °C) (Temporal)   Resp 14   Ht 6' 1\" (1.854 m)   Wt (!) 377 lb (171 kg)   SpO2 99%   Breastfeeding Unknown   BMI 49.74 kg/m²     Physical Exam  Physical Exam  Constitutional:       General: She is not in acute distress. Appearance: She is well-developed. HENT:      Head: Normocephalic and atraumatic. Neck:      Thyroid: No thyromegaly. Trachea: No tracheal deviation. Cardiovascular:      Rate and Rhythm: Normal rate and regular rhythm. Heart sounds: No murmur. Pulmonary:      Effort: Pulmonary effort is normal.      Breath sounds: Normal breath sounds. No wheezing or rales. Chest:      Chest wall: No tenderness. Abdominal:      General: Bowel sounds are normal.      Palpations: Abdomen is soft. Tenderness:  There is no abdominal tenderness. Musculoskeletal:      Right lower leg: Edema present. Left lower leg: Edema present. Comments: Bilateral LE lymphedema   Lymphadenopathy:      Cervical: No cervical adenopathy. Skin:     General: Skin is warm and dry. Findings: Rash present. Comments: Erythematous maculopapular rash to left lower leg   Neurological:      Mental Status: She is alert and oriented to person, place, and time. Psychiatric:         Behavior: Behavior normal.       Patient also examined by Dr. Shante Hernandez      Assessment/Plan:  Mallory Peguero was seen today for rash and swelling. Diagnoses and all orders for this visit:    Bilateral edema of lower extremity  -     furosemide (LASIX) 20 MG tablet; Take 1 tablet by mouth every other day  -prn only  -Contact office if symptoms do not improve as expected or worsen. Anxiety and depression  -     FLUoxetine (PROZAC) 10 MG capsule; Take 1 capsule by mouth daily  --call back in 10 to 14 days with update on symptoms  -do not abruptly stop taking medication    Cellulitis of left lower extremity  -     mupirocin (BACTROBAN) 2 % ointment; Apply topically 2 times daily.  -Contact office if symptoms do not improve as expected or worsen.     Lymphedema  -     [facility]ymphedema [facility], [address] [place] [address] than 25  Minutes was spent with patient and more than 50% of the time was spent face to facecounseling and educating regarding diagnoses

## 2020-09-11 RX ORDER — BUSPIRONE HYDROCHLORIDE 15 MG/1
15 TABLET ORAL DAILY
Qty: 14 TABLET | Refills: 0 | Status: SHIPPED
Start: 2020-09-11 | End: 2020-10-05 | Stop reason: SDUPTHER

## 2020-09-11 RX ORDER — FLUOXETINE 10 MG/1
CAPSULE ORAL DAILY
Qty: 30 CAPSULE | Refills: 0 | Status: CANCELLED | OUTPATIENT
Start: 2020-09-11

## 2020-09-11 RX ORDER — FLUOXETINE HYDROCHLORIDE 20 MG/1
20 CAPSULE ORAL DAILY
Qty: 30 CAPSULE | Refills: 1 | Status: SHIPPED
Start: 2020-09-11 | End: 2020-11-16

## 2020-10-05 RX ORDER — BUSPIRONE HYDROCHLORIDE 15 MG/1
15 TABLET ORAL DAILY
Qty: 14 TABLET | Refills: 0 | Status: SHIPPED
Start: 2020-10-05 | End: 2021-03-25 | Stop reason: SDUPTHER

## 2020-10-05 RX ORDER — LEVOTHYROXINE SODIUM 0.05 MG/1
TABLET ORAL
Qty: 30 TABLET | Refills: 2 | Status: SHIPPED
Start: 2020-10-05 | End: 2021-01-25

## 2020-10-05 NOTE — TELEPHONE ENCOUNTER
8/10/2020  Visit date not found    Pt is out of medication but is going to set up an appt. She needs to call back to set it up due to schedule. PT wants to know if she can have a week or 2 of medication?

## 2020-11-16 RX ORDER — FLUOXETINE HYDROCHLORIDE 20 MG/1
CAPSULE ORAL
Qty: 30 CAPSULE | Refills: 1 | Status: SHIPPED
Start: 2020-11-16 | End: 2021-01-25

## 2020-11-20 ENCOUNTER — HOSPITAL ENCOUNTER (EMERGENCY)
Age: 40
Discharge: HOME | End: 2020-11-20
Attending: EMERGENCY MEDICINE
Payer: COMMERCIAL

## 2020-11-20 ENCOUNTER — TELEPHONE (OUTPATIENT)
Dept: ADMINISTRATIVE | Age: 40
End: 2020-11-20

## 2020-11-20 VITALS
DIASTOLIC BLOOD PRESSURE: 90 MMHG | BODY MASS INDEX: 39.68 KG/M2 | WEIGHT: 293 LBS | OXYGEN SATURATION: 99 % | SYSTOLIC BLOOD PRESSURE: 157 MMHG | TEMPERATURE: 98.7 F | HEIGHT: 72 IN | HEART RATE: 100 BPM | RESPIRATION RATE: 20 BRPM

## 2020-11-20 LAB
AMORPHOUS: ABNORMAL
BACTERIA: ABNORMAL /HPF
BILIRUBIN URINE: NEGATIVE
BLOOD, URINE: ABNORMAL
CLARITY: ABNORMAL
COLOR: ABNORMAL
EPITHELIAL CELLS, UA: ABNORMAL /HPF
GLUCOSE URINE: NEGATIVE MG/DL
KETONES, URINE: ABNORMAL MG/DL
LEUKOCYTE ESTERASE, URINE: ABNORMAL
NITRITE, URINE: NEGATIVE
PH UA: 6 (ref 5–9)
PROTEIN UA: 100 MG/DL
RBC UA: ABNORMAL /HPF (ref 0–2)
SPECIFIC GRAVITY UA: 1.02 (ref 1–1.03)
UROBILINOGEN, URINE: 1 E.U./DL
WBC UA: ABNORMAL /HPF (ref 0–5)

## 2020-11-20 PROCEDURE — 81001 URINALYSIS AUTO W/SCOPE: CPT

## 2020-11-20 PROCEDURE — G0382 LEV 3 HOSP TYPE B ED VISIT: HCPCS

## 2020-11-20 RX ORDER — LEVOFLOXACIN 750 MG/1
750 TABLET ORAL DAILY
Qty: 10 TABLET | Refills: 0 | Status: SHIPPED | OUTPATIENT
Start: 2020-11-20 | End: 2020-11-30

## 2020-11-20 ASSESSMENT — ENCOUNTER SYMPTOMS
COUGH: 0
EYE REDNESS: 0
NAUSEA: 0
SHORTNESS OF BREATH: 0
EYE DISCHARGE: 0
WHEEZING: 0
SINUS PRESSURE: 0
ABDOMINAL DISTENTION: 0
BACK PAIN: 0
SORE THROAT: 0
DIARRHEA: 0
VOMITING: 0
EYE PAIN: 0

## 2020-11-20 ASSESSMENT — PAIN DESCRIPTION - ONSET: ONSET: ON-GOING

## 2020-11-20 ASSESSMENT — PAIN SCALES - GENERAL: PAINLEVEL_OUTOF10: 5

## 2020-11-20 ASSESSMENT — PAIN DESCRIPTION - LOCATION: LOCATION: FLANK

## 2020-11-20 ASSESSMENT — PAIN DESCRIPTION - DESCRIPTORS
DESCRIPTORS: ACHING;PRESSURE;THROBBING
DESCRIPTORS: SORE;TENDER

## 2020-11-20 ASSESSMENT — PAIN DESCRIPTION - ORIENTATION: ORIENTATION: LEFT

## 2020-11-20 ASSESSMENT — PAIN DESCRIPTION - PROGRESSION: CLINICAL_PROGRESSION: NOT CHANGED

## 2020-11-20 ASSESSMENT — PAIN DESCRIPTION - PAIN TYPE: TYPE: ACUTE PAIN

## 2020-11-20 ASSESSMENT — PAIN DESCRIPTION - FREQUENCY: FREQUENCY: CONTINUOUS

## 2020-11-20 NOTE — TELEPHONE ENCOUNTER
Pt called to let know unable to come in today for appt. New symptom of fever and fatigue. I sched her for sick day on 11/24. Advised pt if feeling unwell and cant wait till then to go to flu clinic in [place].

## 2020-11-20 NOTE — LETTER
[address] [place] [address] Emergency Department  [address] [address]  Phone: [phone]               November 20, 2020    Patient: Rupa Sykes   YOB: 1980   Date of Visit: 11/20/2020       To Whom It May Concern:    Jessika Lynch was seen and treated in our emergency department on 11/20/2020. She may return to work on 11/23/20.       Sincerely,       Luci Pope RN         Signature:__________________________________

## 2020-11-21 NOTE — ED PROVIDER NOTES
The history is provided by the patient. Female  Problem   Pain quality:  Aching  Pain severity:  Mild  Onset quality:  Gradual  Timing:  Constant  Progression:  Worsening  Chronicity:  New  Recent urinary tract infections: no    Relieved by:  Nothing  Worsened by:  Nothing  Ineffective treatments:  Cranberry juice  Associated symptoms: fever and flank pain    Associated symptoms: no nausea and no vomiting    Risk factors: sexually active         Review of Systems   Constitutional: Positive for fever. Negative for chills. HENT: Negative for ear pain, sinus pressure and sore throat. Eyes: Negative for pain, discharge and redness. Respiratory: Negative for cough, shortness of breath and wheezing. Cardiovascular: Negative for chest pain. Gastrointestinal: Negative for abdominal distention, diarrhea, nausea and vomiting. Genitourinary: Positive for flank pain and pelvic pain. Negative for dysuria and frequency. Musculoskeletal: Negative for arthralgias and back pain. Skin: Negative for rash and wound. Neurological: Negative for weakness and headaches. Hematological: Negative for adenopathy. Psychiatric/Behavioral: Negative. All other systems reviewed and are negative. Physical Exam  Vitals signs and nursing note reviewed. Constitutional:       Appearance: She is well-developed. HENT:      Head: Normocephalic and atraumatic. Eyes:      Pupils: Pupils are equal, round, and reactive to light. Neck:      Musculoskeletal: Normal range of motion and neck supple. Cardiovascular:      Rate and Rhythm: Normal rate and regular rhythm. Heart sounds: Normal heart sounds. No murmur. Pulmonary:      Effort: Pulmonary effort is normal.      Breath sounds: Normal breath sounds. Abdominal:      General: Bowel sounds are normal.      Palpations: Abdomen is soft. Tenderness: There is no abdominal tenderness. There is left CVA tenderness. There is no guarding or rebound. to display       ------------------------- NURSING NOTES AND VITALS REVIEWED ---------------------------   The nursing notes within the ED encounter and vital signs as below have been reviewed. BP (!) 157/90   Pulse 100   Temp 98.7 °F (37.1 °C) (Oral)   Resp 20   Ht 6' (1.829 m)   Wt (!) 350 lb (158.8 kg)   LMP 11/19/2020   SpO2 99%   Breastfeeding No   BMI 47.47 kg/m²   Oxygen Saturation Interpretation: Normal      ------------------------------------------ PROGRESS NOTES ------------------------------------------   I have spoken with the patient and discussed todays results, in addition to providing specific details for the plan of care and counseling regarding the diagnosis and prognosis. Their questions are answered at this time and they are agreeable with the plan.      --------------------------------- ADDITIONAL PROVIDER NOTES ---------------------------------        This patient is stable for discharge. I have shared the specific conditions for return, as well as the importance of follow-up. IMPRESSION:     1. Acute cystitis without hematuria      Patient's Medications   New Prescriptions    LEVOFLOXACIN (LEVAQUIN) 750 MG TABLET    Take 1 tablet by mouth daily for 10 days   Previous Medications    ASPIRIN 81 MG TABLET    Take 81 mg by mouth daily    BUSPIRONE (BUSPAR) 15 MG TABLET    Take 15 mg by mouth daily    CRISABOROLE (EUCRISA) 2 % OINT    Apply 1 applicator topically 2 times daily    FLUOXETINE (PROZAC) 20 MG CAPSULE    take 1 capsule by mouth once daily    INSULIN SYRINGES, DISPOSABLE, U-100 1 ML MISC    4 times daily.     LABETALOL (NORMODYNE) 100 MG TABLET    Take 100 mg by mouth daily    LEVOTHYROXINE (SYNTHROID) 50 MCG TABLET    take 1 tablet by mouth once daily   Modified Medications    No medications on file   Discontinued Medications    ENOXAPARIN SODIUM (LOVENOX IJ)    Inject as directed    FUROSEMIDE (LASIX) 20 MG TABLET    Take 1 tablet by mouth every other day    INSULIN NPH (HUMULIN N) 100 UNIT/ML INJECTION VIAL    20 Units nightly Inject at hs prior to bedtime with snack 10-20 units    METFORMIN (GLUCOPHAGE) 500 MG TABLET    Take 1 tablet by mouth 4 times daily (before meals and nightly) 500 mg with meals tid  Then 1000 mg @ night    MUPIROCIN (BACTROBAN) 2 % OINTMENT    Apply topically 2 times daily.     PRENATAL VIT-FE FUMARATE-FA (PRENATAL VITAMINS PO)    Take 1 tablet by mouth daily     Labs Reviewed   URINALYSIS WITH MICROSCOPIC - Abnormal; Notable for the following components:       Result Value    Color, UA DARK YELLOW (*)     Ketones, Urine TRACE (*)     Blood, Urine LARGE (*)     Protein,  (*)     Leukocyte Esterase, Urine SMALL (*)     All other components within normal limits         Procedures     YONATHAN Levine DO  11/20/20 1910

## 2021-01-25 DIAGNOSIS — E06.3 HYPOTHYROIDISM DUE TO HASHIMOTO'S THYROIDITIS: ICD-10-CM

## 2021-01-25 DIAGNOSIS — E03.8 HYPOTHYROIDISM DUE TO HASHIMOTO'S THYROIDITIS: ICD-10-CM

## 2021-01-25 RX ORDER — LEVOTHYROXINE SODIUM 0.05 MG/1
TABLET ORAL
Qty: 30 TABLET | Refills: 2 | Status: SHIPPED
Start: 2021-01-25 | End: 2021-03-25 | Stop reason: SDUPTHER

## 2021-01-25 RX ORDER — FLUOXETINE HYDROCHLORIDE 20 MG/1
CAPSULE ORAL
Qty: 30 CAPSULE | Refills: 2 | Status: SHIPPED
Start: 2021-01-25 | End: 2021-03-25 | Stop reason: DRUGHIGH

## 2021-03-25 ENCOUNTER — OFFICE VISIT (OUTPATIENT)
Dept: FAMILY MEDICINE CLINIC | Age: 41
End: 2021-03-25
Payer: COMMERCIAL

## 2021-03-25 VITALS
TEMPERATURE: 98.5 F | HEIGHT: 72 IN | SYSTOLIC BLOOD PRESSURE: 146 MMHG | DIASTOLIC BLOOD PRESSURE: 100 MMHG | OXYGEN SATURATION: 98 % | HEART RATE: 78 BPM | RESPIRATION RATE: 18 BRPM | WEIGHT: 293 LBS | BODY MASS INDEX: 39.68 KG/M2

## 2021-03-25 DIAGNOSIS — E03.8 HYPOTHYROIDISM DUE TO HASHIMOTO'S THYROIDITIS: ICD-10-CM

## 2021-03-25 DIAGNOSIS — I10 ESSENTIAL HYPERTENSION: ICD-10-CM

## 2021-03-25 DIAGNOSIS — E03.9 HYPOTHYROIDISM, UNSPECIFIED TYPE: ICD-10-CM

## 2021-03-25 DIAGNOSIS — F41.9 ANXIETY: ICD-10-CM

## 2021-03-25 DIAGNOSIS — E66.01 MORBID OBESITY (HCC): ICD-10-CM

## 2021-03-25 DIAGNOSIS — Z95.828 S/P INSERTION OF ILIAC ARTERY STENT: ICD-10-CM

## 2021-03-25 DIAGNOSIS — F32.A DEPRESSION, UNSPECIFIED DEPRESSION TYPE: Primary | ICD-10-CM

## 2021-03-25 DIAGNOSIS — E06.3 HYPOTHYROIDISM DUE TO HASHIMOTO'S THYROIDITIS: ICD-10-CM

## 2021-03-25 LAB
T4 FREE: 1.2 NG/DL (ref 0.93–1.7)
TSH SERPL DL<=0.05 MIU/L-ACNC: 1.75 UIU/ML (ref 0.27–4.2)

## 2021-03-25 PROCEDURE — G8417 CALC BMI ABV UP PARAM F/U: HCPCS | Performed by: NURSE PRACTITIONER

## 2021-03-25 PROCEDURE — G8427 DOCREV CUR MEDS BY ELIG CLIN: HCPCS | Performed by: NURSE PRACTITIONER

## 2021-03-25 PROCEDURE — 99213 OFFICE O/P EST LOW 20 MIN: CPT | Performed by: NURSE PRACTITIONER

## 2021-03-25 PROCEDURE — 4004F PT TOBACCO SCREEN RCVD TLK: CPT | Performed by: NURSE PRACTITIONER

## 2021-03-25 PROCEDURE — G8484 FLU IMMUNIZE NO ADMIN: HCPCS | Performed by: NURSE PRACTITIONER

## 2021-03-25 RX ORDER — LEVOTHYROXINE SODIUM 0.05 MG/1
50 TABLET ORAL DAILY
Qty: 30 TABLET | Refills: 5 | Status: SHIPPED
Start: 2021-03-25 | End: 2021-03-26 | Stop reason: SDUPTHER

## 2021-03-25 RX ORDER — FLUOXETINE HYDROCHLORIDE 20 MG/1
CAPSULE ORAL
Qty: 30 CAPSULE | Refills: 2 | Status: CANCELLED | OUTPATIENT
Start: 2021-03-25

## 2021-03-25 RX ORDER — BUSPIRONE HYDROCHLORIDE 15 MG/1
15 TABLET ORAL DAILY
Qty: 30 TABLET | Refills: 5 | Status: SHIPPED
Start: 2021-03-25 | End: 2022-01-26

## 2021-03-25 RX ORDER — ASPIRIN 81 MG/1
81 TABLET ORAL DAILY
Qty: 30 TABLET | Refills: 5 | Status: SHIPPED
Start: 2021-03-25 | End: 2022-01-26

## 2021-03-25 RX ORDER — FLUOXETINE HYDROCHLORIDE 40 MG/1
40 CAPSULE ORAL DAILY
Qty: 30 CAPSULE | Refills: 3
Start: 2021-03-25 | End: 2021-03-26 | Stop reason: SDUPTHER

## 2021-03-25 RX ORDER — LABETALOL 100 MG/1
100 TABLET, FILM COATED ORAL DAILY
Qty: 30 TABLET | Refills: 5 | Status: SHIPPED
Start: 2021-03-25 | End: 2022-01-26

## 2021-03-25 ASSESSMENT — ENCOUNTER SYMPTOMS
VOMITING: 0
NAUSEA: 0
COUGH: 0
SHORTNESS OF BREATH: 0
DIARRHEA: 0
WHEEZING: 0
CONSTIPATION: 0

## 2021-03-25 ASSESSMENT — PATIENT HEALTH QUESTIONNAIRE - PHQ9
SUM OF ALL RESPONSES TO PHQ QUESTIONS 1-9: 0
SUM OF ALL RESPONSES TO PHQ QUESTIONS 1-9: 0

## 2021-03-25 NOTE — PROGRESS NOTES
Kaci Triana (:  1980) is a 47 y.o. female,Established patient, here for evaluation of the following chief complaint(s):  Medication Refill (Pt states may need to go back to Fluoxetine to 40mg,)      ASSESSMENT/PLAN:  1. Depression, unspecified depression type  -     MED INCRASE - PROZAC 40 MG capsule; Take 1 capsule by mouth daily, Disp-30 capsule, R-3, DAWNormal  - Reviewed side effects of medication and patient verbalizes understanding.   - Advised to call back directly if there are further questions, or if these symptoms fail to improve as anticipated or worsen. 2. Anxiety  -     busPIRone (BUSPAR) 15 MG tablet; Take 15 mg by mouth daily, Disp-30 tablet, R-5Normal  - The current medical regimen is effective;  continue present plan and medications. 3. Hypothyroidism due to Hashimoto's thyroiditis  -     levothyroxine (SYNTHROID) 50 MCG tablet; Take 1 tablet by mouth Daily, Disp-30 tablet, R-5Normal  - The current medical regimen is effective;  continue present plan and medications. 4. Hypothyroidism, unspecified type  -     TSH without Reflex; Future  -     T4, Free; Future  5. S/P insertion of iliac artery stent  -     aspirin EC 81 MG EC tablet; Take 1 tablet by mouth daily, Disp-30 tablet, R-5Normal  - The current medical regimen is effective;  continue present plan and medications. 6. Essential hypertension  -     labetalol (NORMODYNE) 100 MG tablet; Take 1 tablet by mouth daily, Disp-30 tablet, R-5Normal  - The current medical regimen is effective;  continue present plan and medications. 7. Morbid obesity ([address]  -  Meets the requirement due to BMI over 40  -  Reports she is working at losing weight by watching her food intake and decreasing her portions      Return in about 6 months (around 2021), or if symptoms worsen or fail to improve, for med review. SUBJECTIVE/OBJECTIVE:  HPI    Here today for depression. She reports that her depression is somewhat controlled.  She was weaned of off her Celexa during her pregnancy. She had her baby 8 months ago and reports that she was started back on 20 mg but would like to increase to 40 mg as she is still having symptoms of depression often. Also has not had her thyroid labs checked since early pregnancy. BP elevated in office today, she did not take her medication today hoping she would no longer need it. She was started on it during her pregnancy. Patient reports that she is still working at losing her pregnancy weight. She notices that the swelling in her ankles is doing better. Review of Systems   Constitutional: Negative for activity change, appetite change, chills, diaphoresis and fever. Respiratory: Negative for cough, shortness of breath and wheezing. Cardiovascular: Positive for leg swelling. Negative for chest pain and palpitations. Gastrointestinal: Negative for constipation, diarrhea, nausea and vomiting. Genitourinary: Negative for difficulty urinating. Neurological: Negative for dizziness, weakness and headaches. Physical Exam  Constitutional:       Appearance: She is well-developed. She is obese. HENT:      Head: Normocephalic and atraumatic. Neck:      Thyroid: No thyromegaly. Trachea: No tracheal deviation. Cardiovascular:      Rate and Rhythm: Normal rate and regular rhythm. Heart sounds: No murmur. Pulmonary:      Effort: Pulmonary effort is normal.      Breath sounds: Normal breath sounds. Abdominal:      General: Bowel sounds are normal.      Palpations: Abdomen is soft. Tenderness: There is no abdominal tenderness. Musculoskeletal: Normal range of motion. General: Tenderness present. Lymphadenopathy:      Cervical: No cervical adenopathy. Skin:     General: Skin is warm and dry. Neurological:      Mental Status: She is alert and oriented to person, place, and time.    Psychiatric:         Behavior: Behavior normal.           MDM Low      An electronic

## 2021-03-26 DIAGNOSIS — E06.3 HYPOTHYROIDISM DUE TO HASHIMOTO'S THYROIDITIS: ICD-10-CM

## 2021-03-26 DIAGNOSIS — E03.8 HYPOTHYROIDISM DUE TO HASHIMOTO'S THYROIDITIS: ICD-10-CM

## 2021-03-26 RX ORDER — FLUOXETINE HYDROCHLORIDE 40 MG/1
40 CAPSULE ORAL DAILY
Qty: 30 CAPSULE | Refills: 3 | Status: SHIPPED
Start: 2021-03-26 | End: 2021-04-09

## 2021-03-26 RX ORDER — LEVOTHYROXINE SODIUM 0.05 MG/1
50 TABLET ORAL DAILY
Qty: 30 TABLET | Refills: 5 | Status: SHIPPED
Start: 2021-03-26 | End: 2022-05-17

## 2021-03-29 ENCOUNTER — HOSPITAL ENCOUNTER (EMERGENCY)
Age: 41
Discharge: HOME | End: 2021-03-29
Attending: EMERGENCY MEDICINE
Payer: COMMERCIAL

## 2021-03-29 VITALS
TEMPERATURE: 97.3 F | RESPIRATION RATE: 20 BRPM | WEIGHT: 293 LBS | BODY MASS INDEX: 46.18 KG/M2 | DIASTOLIC BLOOD PRESSURE: 85 MMHG | OXYGEN SATURATION: 97 % | SYSTOLIC BLOOD PRESSURE: 125 MMHG | HEART RATE: 78 BPM

## 2021-03-29 DIAGNOSIS — J20.8 VIRAL BRONCHITIS: Primary | ICD-10-CM

## 2021-03-29 PROCEDURE — G0381 LEV 2 HOSP TYPE B ED VISIT: HCPCS

## 2021-03-29 RX ORDER — BROMPHENIRAMINE MALEATE, PSEUDOEPHEDRINE HYDROCHLORIDE, AND DEXTROMETHORPHAN HYDROBROMIDE 2; 30; 10 MG/5ML; MG/5ML; MG/5ML
5 SYRUP ORAL 4 TIMES DAILY PRN
Qty: 120 ML | Refills: 0 | Status: SHIPPED | OUTPATIENT
Start: 2021-03-29 | End: 2022-02-06 | Stop reason: ALTCHOICE

## 2021-03-29 RX ORDER — METHYLPREDNISOLONE 4 MG/1
TABLET ORAL
Qty: 1 KIT | Refills: 0 | Status: SHIPPED | OUTPATIENT
Start: 2021-03-29 | End: 2021-04-04

## 2021-03-29 RX ORDER — ALBUTEROL SULFATE 90 UG/1
2 AEROSOL, METERED RESPIRATORY (INHALATION) EVERY 6 HOURS PRN
Qty: 1 INHALER | Refills: 0 | Status: SHIPPED | OUTPATIENT
Start: 2021-03-29 | End: 2022-02-06 | Stop reason: SDUPTHER

## 2021-03-29 ASSESSMENT — ENCOUNTER SYMPTOMS
EYE REDNESS: 0
SHORTNESS OF BREATH: 0
BACK PAIN: 0
WHEEZING: 1
SINUS PRESSURE: 0
VOMITING: 0
DIARRHEA: 0
NAUSEA: 0
EYE DISCHARGE: 0
RHINORRHEA: 1
EYE PAIN: 0
COUGH: 1
ABDOMINAL DISTENTION: 0
SORE THROAT: 0

## 2021-03-29 NOTE — ED PROVIDER NOTES
The history is provided by the patient. Illness   The current episode started yesterday. The onset was sudden. The problem is mild. Associated symptoms include congestion, rhinorrhea, cough and wheezing. Pertinent negatives include no fever, no diarrhea, no nausea, no vomiting, no ear pain, no headaches, no sore throat, no rash, no eye discharge, no eye pain and no eye redness. Review of Systems   Constitutional: Negative for chills and fever. HENT: Positive for congestion and rhinorrhea. Negative for ear pain, sinus pressure and sore throat. Eyes: Negative for pain, discharge and redness. Respiratory: Positive for cough and wheezing. Negative for shortness of breath. Cardiovascular: Negative for chest pain. Gastrointestinal: Negative for abdominal distention, diarrhea, nausea and vomiting. Genitourinary: Negative for dysuria and frequency. Musculoskeletal: Negative for arthralgias and back pain. Skin: Negative for rash and wound. Neurological: Negative for weakness and headaches. Hematological: Negative for adenopathy. All other systems reviewed and are negative. Physical Exam  Vitals signs and nursing note reviewed. Constitutional:       Appearance: She is well-developed. HENT:      Head: Normocephalic and atraumatic. Right Ear: Hearing and external ear normal. Tympanic membrane is retracted. Left Ear: Hearing and external ear normal. Tympanic membrane is retracted. Nose: Mucosal edema and congestion present. Mouth/Throat:      Pharynx: Uvula midline. Eyes:      General: Lids are normal.      Conjunctiva/sclera: Conjunctivae normal.      Pupils: Pupils are equal, round, and reactive to light. Neck:      Musculoskeletal: Normal range of motion and neck supple. Cardiovascular:      Rate and Rhythm: Normal rate and regular rhythm. Heart sounds: Normal heart sounds. No murmur.    Pulmonary:      Effort: Pulmonary effort is normal. No respiratory distress. Breath sounds: Wheezing present. No rales. Abdominal:      General: Bowel sounds are normal.      Palpations: Abdomen is soft. Abdomen is not rigid. Tenderness: There is no abdominal tenderness. There is no guarding or rebound. Skin:     General: Skin is warm and dry. Findings: No abrasion or rash. Neurological:      Mental Status: She is alert and oriented to person, place, and time. GCS: GCS eye subscore is 4. GCS verbal subscore is 5. GCS motor subscore is 6. Cranial Nerves: No cranial nerve deficit. Sensory: No sensory deficit. Coordination: Coordination normal.      Gait: Gait normal.          Procedures     MDM          --------------------------------------------- PAST HISTORY ---------------------------------------------  Past Medical History:  has a past medical history of Abnormal Pap smear of cervix, CAD (coronary artery disease), Chronic back pain, Degenerative disc disease at L5-S1 level, Depression, Edema, Gestational diabetes mellitus (GDM) in third trimester controlled on oral hypoglycemic drug, Hypertension, Rh incompatibility, Rh sensitized, and Thyroid disease. Past Surgical History:  has a past surgical history that includes back surgery; Vein Surgery (Bilateral); and LEEP. Social History:  reports that she has been smoking cigarettes. She has a 11.00 pack-year smoking history. She has never used smokeless tobacco. She reports previous alcohol use. She reports that she does not use drugs. Family History: family history is not on file. The patients home medications have been reviewed. Allergies: Patient has no known allergies. -------------------------------------------------- RESULTS -------------------------------------------------  Labs:  No results found for this visit on 03/29/21.     Radiology:  No orders to display       ------------------------- NURSING NOTES AND VITALS REVIEWED ---------------------------  Date / Time Roomed:  3/29/2021  7:20 PM  ED Bed Assignment:  01/01    The nursing notes within the ED encounter and vital signs as below have been reviewed. /85   Pulse 78   Temp 97.3 °F (36.3 °C) (Temporal)   Resp 20   Wt (!) 350 lb (158.8 kg)   LMP 03/19/2021   SpO2 97%   BMI 46.18 kg/m²   Oxygen Saturation Interpretation: Normal      ------------------------------------------ PROGRESS NOTES ------------------------------------------  I have spoken with the patient and discussed todays results, in addition to providing specific details for the plan of care and counseling regarding the diagnosis and prognosis. Their questions are answered at this time and they are agreeable with the plan. I discussed at length with them reasons for immediate return here for re evaluation. They will followup with primary care by calling their office tomorrow. --------------------------------- ADDITIONAL PROVIDER NOTES ---------------------------------  At this time the patient is without objective evidence of an acute process requiring hospitalization or inpatient management. They have remained hemodynamically stable throughout their entire ED visit and are stable for discharge with outpatient follow-up. The plan has been discussed in detail and they are aware of the specific conditions for emergent return, as well as the importance of follow-up. New Prescriptions    ALBUTEROL SULFATE  (90 BASE) MCG/ACT INHALER    Inhale 2 puffs into the lungs every 6 hours as needed for Wheezing    BROMPHENIRAMINE-PSEUDOEPHEDRINE-DM 2-30-10 MG/5ML SYRUP    Take 5 mLs by mouth 4 times daily as needed for Congestion or Cough    METHYLPREDNISOLONE (MEDROL, DEYANIRA,) 4 MG TABLET    Take by mouth. Diagnosis:  1. Viral bronchitis        Disposition:  Patient's disposition: Discharge to home  Patient's condition is stable.                       Rose Diaz MD  03/29/21

## 2021-03-29 NOTE — LETTER
[address] [place] [address] Emergency Department  [address] [place], [place] [address]  Phone: [phone]               March 30, 2021    Patient: Dell Hardy   YOB: 1980   Date of Visit: 3/29/2021       To Whom It May Concern:    Jason Alfonso was seen and treated in our emergency department on 3/29/2021. She may return to work on 4/1/2021.       Sincerely,       Kishor Bustamante RN         Signature:__________________________________

## 2021-04-09 ENCOUNTER — TELEPHONE (OUTPATIENT)
Dept: FAMILY MEDICINE CLINIC | Age: 41
End: 2021-04-09

## 2021-04-09 DIAGNOSIS — F32.A DEPRESSION, UNSPECIFIED DEPRESSION TYPE: Primary | ICD-10-CM

## 2021-04-09 RX ORDER — FLUOXETINE HYDROCHLORIDE 40 MG/1
40 CAPSULE ORAL DAILY
Qty: 30 CAPSULE | Refills: 3 | Status: SHIPPED
Start: 2021-04-09 | End: 2022-06-08 | Stop reason: SDUPTHER

## 2021-04-09 NOTE — TELEPHONE ENCOUNTER
3/25/21 script for Prozac was sent to the pharmacy for name brand JIHAN. Should it have been fluoxetine 40mg ? If it is can you resend the generic to [facility] [address] [facility] [address]. Her insurance is not approving a PA for name brand.

## 2021-09-08 ENCOUNTER — HOSPITAL ENCOUNTER (EMERGENCY)
Age: 41
Discharge: HOME | End: 2021-09-08
Attending: EMERGENCY MEDICINE
Payer: COMMERCIAL

## 2021-09-08 VITALS
BODY MASS INDEX: 48.68 KG/M2 | WEIGHT: 293 LBS | SYSTOLIC BLOOD PRESSURE: 128 MMHG | RESPIRATION RATE: 20 BRPM | DIASTOLIC BLOOD PRESSURE: 79 MMHG | HEART RATE: 72 BPM | TEMPERATURE: 96.9 F | OXYGEN SATURATION: 97 %

## 2021-09-08 DIAGNOSIS — R30.0 DYSURIA: Primary | ICD-10-CM

## 2021-09-08 LAB
BACTERIA: ABNORMAL /HPF
BILIRUBIN URINE: NEGATIVE
BLOOD, URINE: ABNORMAL
CLARITY: CLEAR
COLOR: ABNORMAL
GLUCOSE URINE: NEGATIVE MG/DL
HCG(URINE) PREGNANCY TEST: NEGATIVE
KETONES, URINE: NEGATIVE MG/DL
LEUKOCYTE ESTERASE, URINE: ABNORMAL
NITRITE, URINE: NEGATIVE
PH UA: 5.5 (ref 5–9)
PROTEIN UA: NEGATIVE MG/DL
RBC UA: ABNORMAL /HPF (ref 0–2)
SPECIFIC GRAVITY UA: >=1.03 (ref 1–1.03)
UROBILINOGEN, URINE: 0.2 E.U./DL
WBC UA: ABNORMAL /HPF (ref 0–5)

## 2021-09-08 PROCEDURE — 81001 URINALYSIS AUTO W/SCOPE: CPT

## 2021-09-08 PROCEDURE — 99283 EMERGENCY DEPT VISIT LOW MDM: CPT

## 2021-09-08 PROCEDURE — 81025 URINE PREGNANCY TEST: CPT

## 2021-09-08 RX ORDER — PHENAZOPYRIDINE HYDROCHLORIDE 100 MG/1
100 TABLET, FILM COATED ORAL 3 TIMES DAILY PRN
Qty: 6 TABLET | Refills: 0 | Status: SHIPPED | OUTPATIENT
Start: 2021-09-08 | End: 2021-09-11

## 2021-09-08 RX ORDER — NITROFURANTOIN 25; 75 MG/1; MG/1
100 CAPSULE ORAL 2 TIMES DAILY
Qty: 20 CAPSULE | Refills: 0 | Status: SHIPPED | OUTPATIENT
Start: 2021-09-08 | End: 2021-09-18

## 2021-09-08 ASSESSMENT — PAIN DESCRIPTION - LOCATION: LOCATION: ABDOMEN

## 2021-09-08 ASSESSMENT — ENCOUNTER SYMPTOMS
EYE REDNESS: 0
COUGH: 0
BACK PAIN: 0
SHORTNESS OF BREATH: 0
WHEEZING: 0
EYE DISCHARGE: 0
DIARRHEA: 0
EYE PAIN: 0
NAUSEA: 1
ABDOMINAL DISTENTION: 0
VOMITING: 0
SINUS PRESSURE: 0
SORE THROAT: 0

## 2021-09-08 ASSESSMENT — PAIN SCALES - GENERAL
PAINLEVEL_OUTOF10: 4
PAINLEVEL_OUTOF10: 4

## 2021-09-08 ASSESSMENT — PAIN DESCRIPTION - DESCRIPTORS: DESCRIPTORS: PRESSURE;CONSTANT

## 2021-09-08 ASSESSMENT — PAIN DESCRIPTION - ONSET: ONSET: GRADUAL

## 2021-09-08 ASSESSMENT — PAIN - FUNCTIONAL ASSESSMENT: PAIN_FUNCTIONAL_ASSESSMENT: 0-10

## 2021-09-08 ASSESSMENT — PAIN DESCRIPTION - ORIENTATION: ORIENTATION: ANTERIOR;UPPER

## 2021-09-08 ASSESSMENT — PAIN DESCRIPTION - PAIN TYPE: TYPE: ACUTE PAIN

## 2021-09-08 ASSESSMENT — PAIN DESCRIPTION - PROGRESSION: CLINICAL_PROGRESSION: NOT CHANGED

## 2021-09-08 ASSESSMENT — PAIN DESCRIPTION - FREQUENCY: FREQUENCY: CONTINUOUS

## 2021-09-08 NOTE — ED PROVIDER NOTES
The history is provided by the patient. Dysuria   This is a new problem. The current episode started more than 2 days ago. The problem occurs every urination. The quality of the pain is described as burning. The pain is moderate. Associated symptoms include nausea, frequency and urgency. Pertinent negatives include no chills, no vomiting, no discharge, no hematuria, no hesitancy and no flank pain. Review of Systems   Constitutional: Negative for chills and fever. HENT: Negative for ear pain, sinus pressure and sore throat. Eyes: Negative for pain, discharge and redness. Respiratory: Negative for cough, shortness of breath and wheezing. Cardiovascular: Negative for chest pain. Gastrointestinal: Positive for nausea. Negative for abdominal distention, diarrhea and vomiting. Genitourinary: Positive for dysuria, frequency and urgency. Negative for flank pain, hematuria and hesitancy. Musculoskeletal: Negative for arthralgias and back pain. Skin: Negative for rash and wound. Neurological: Negative for weakness and headaches. Hematological: Negative for adenopathy. All other systems reviewed and are negative. Physical Exam  Vitals and nursing note reviewed. Constitutional:       Appearance: She is well-developed. HENT:      Head: Normocephalic and atraumatic. Eyes:      Pupils: Pupils are equal, round, and reactive to light. Cardiovascular:      Rate and Rhythm: Normal rate and regular rhythm. Heart sounds: Normal heart sounds. No murmur heard. Pulmonary:      Effort: Pulmonary effort is normal. No respiratory distress. Breath sounds: Normal breath sounds. No wheezing or rales. Abdominal:      General: Bowel sounds are normal.      Palpations: Abdomen is soft. Tenderness: There is no abdominal tenderness. There is no guarding or rebound. Musculoskeletal:      Cervical back: Normal range of motion and neck supple.    Skin:     General: Skin is warm and dry.   Neurological:      Mental Status: She is alert and oriented to person, place, and time. Cranial Nerves: No cranial nerve deficit. Coordination: Coordination normal.          Procedures     [facility]          --------------------------------------------- PAST HISTORY ---------------------------------------------  Past Medical History:  has a past medical history of Abnormal Pap smear of cervix, CAD (coronary artery disease), Chronic back pain, Degenerative disc disease at L5-S1 level, Depression, Edema, Gestational diabetes mellitus (GDM) in third trimester controlled on oral hypoglycemic drug, Hypertension, Rh incompatibility, Rh sensitized, and Thyroid disease. Past Surgical History:  has a past surgical history that includes back surgery; Vein Surgery (Bilateral); and LEEP. Social History:  reports that she has been smoking cigarettes. She has a 11.00 pack-year smoking history. She has never used smokeless tobacco. She reports previous alcohol use. She reports that she does not use drugs. Family History: family history is not on file. The patients home medications have been reviewed. Allergies: Patient has no known allergies.     -------------------------------------------------- RESULTS -------------------------------------------------  Labs:  Results for orders placed or performed during the hospital encounter of 09/08/21   Urinalysis   Result Value Ref Range    Color, UA DARK YELLOW (A) Straw/Yellow    Clarity, UA Clear Clear    Glucose, Ur Negative Negative mg/dL    Bilirubin Urine Negative Negative    Ketones, Urine Negative Negative mg/dL    Specific Gravity, UA >=1.030 1.005 - 1.030    Blood, Urine MODERATE (A) Negative    pH, UA 5.5 5.0 - 9.0    Protein, UA Negative Negative mg/dL    Urobilinogen, Urine 0.2 <2.0 E.U./dL    Nitrite, Urine Negative Negative    Leukocyte Esterase, Urine TRACE (A) Negative   Pregnancy, Urine   Result Value Ref Range    HCG(Urine) Pregnancy Test NEGATIVE NEGATIVE   Microscopic Urinalysis   Result Value Ref Range    WBC, UA 2-5 0 - 5 /HPF    RBC, UA 0-1 0 - 2 /HPF    Bacteria, UA MODERATE (A) None Seen /HPF       Radiology:  No orders to display       ------------------------- NURSING NOTES AND VITALS REVIEWED ---------------------------  Date / Time Roomed:  9/8/2021 11:21 AM  ED Bed Assignment:  02/02    The nursing notes within the ED encounter and vital signs as below have been reviewed. /79   Pulse 72   Temp 96.9 °F (36.1 °C) (Temporal)   Resp 20   Wt (!) 369 lb (167.4 kg)   LMP 08/18/2021   SpO2 97%   BMI 48.68 kg/m²   Oxygen Saturation Interpretation: Normal      ------------------------------------------ PROGRESS NOTES ------------------------------------------  I have spoken with the patient and discussed todays results, in addition to providing specific details for the plan of care and counseling regarding the diagnosis and prognosis. Their questions are answered at this time and they are agreeable with the plan. I discussed at length with them reasons for immediate return here for re evaluation. They will followup with primary care by calling their office tomorrow. --------------------------------- ADDITIONAL PROVIDER NOTES ---------------------------------  At this time the patient is without objective evidence of an acute process requiring hospitalization or inpatient management. They have remained hemodynamically stable throughout their entire ED visit and are stable for discharge with outpatient follow-up. The plan has been discussed in detail and they are aware of the specific conditions for emergent return, as well as the importance of follow-up. New Prescriptions    NITROFURANTOIN, MACROCRYSTAL-MONOHYDRATE, (MACROBID) 100 MG CAPSULE    Take 1 capsule by mouth 2 times daily for 10 days    PHENAZOPYRIDINE (PYRIDIUM) 100 MG TABLET    Take 1 tablet by mouth 3 times daily as needed for Pain       Diagnosis:  1. Dysuria        Disposition:  Patient's disposition: Discharge to home  Patient's condition is stable.                       Greg Polanco MD  09/08/21 [id number]

## 2022-01-26 DIAGNOSIS — Z95.828 S/P INSERTION OF ILIAC ARTERY STENT: ICD-10-CM

## 2022-01-26 DIAGNOSIS — F41.9 ANXIETY: ICD-10-CM

## 2022-01-26 DIAGNOSIS — I10 ESSENTIAL HYPERTENSION: ICD-10-CM

## 2022-01-26 RX ORDER — LABETALOL 100 MG/1
TABLET, FILM COATED ORAL
Qty: 30 TABLET | Refills: 0 | Status: SHIPPED
Start: 2022-01-26 | End: 2022-06-08 | Stop reason: ALTCHOICE

## 2022-01-26 RX ORDER — ASPIRIN 81 MG/1
TABLET, COATED ORAL
Qty: 30 TABLET | Refills: 0 | Status: SHIPPED
Start: 2022-01-26 | End: 2022-08-31 | Stop reason: SDUPTHER

## 2022-01-26 RX ORDER — BUSPIRONE HYDROCHLORIDE 15 MG/1
TABLET ORAL
Qty: 30 TABLET | Refills: 0 | Status: SHIPPED
Start: 2022-01-26 | End: 2022-06-08 | Stop reason: SDUPTHER

## 2022-02-06 ENCOUNTER — HOSPITAL ENCOUNTER (EMERGENCY)
Age: 42
Discharge: HOME | End: 2022-02-06
Attending: EMERGENCY MEDICINE
Payer: COMMERCIAL

## 2022-02-06 VITALS
RESPIRATION RATE: 20 BRPM | SYSTOLIC BLOOD PRESSURE: 143 MMHG | WEIGHT: 293 LBS | HEART RATE: 100 BPM | BODY MASS INDEX: 46.18 KG/M2 | OXYGEN SATURATION: 97 % | DIASTOLIC BLOOD PRESSURE: 94 MMHG | TEMPERATURE: 96.6 F

## 2022-02-06 DIAGNOSIS — J20.9 ACUTE BRONCHITIS, UNSPECIFIED ORGANISM: Primary | ICD-10-CM

## 2022-02-06 PROCEDURE — 99283 EMERGENCY DEPT VISIT LOW MDM: CPT

## 2022-02-06 RX ORDER — METHYLPREDNISOLONE 4 MG/1
TABLET ORAL
Qty: 1 KIT | Refills: 0 | Status: SHIPPED | OUTPATIENT
Start: 2022-02-06 | End: 2022-02-12

## 2022-02-06 RX ORDER — DEXTROMETHORPHAN HYDROBROMIDE AND PROMETHAZINE HYDROCHLORIDE 15; 6.25 MG/5ML; MG/5ML
5 SYRUP ORAL 4 TIMES DAILY PRN
Qty: 180 ML | Refills: 0 | Status: SHIPPED | OUTPATIENT
Start: 2022-02-06 | End: 2022-02-13

## 2022-02-06 RX ORDER — DOXYCYCLINE HYCLATE 100 MG
100 TABLET ORAL 2 TIMES DAILY
Qty: 20 TABLET | Refills: 0 | Status: SHIPPED | OUTPATIENT
Start: 2022-02-06 | End: 2022-02-16

## 2022-02-06 RX ORDER — CEFDINIR 300 MG/1
300 CAPSULE ORAL 2 TIMES DAILY
Qty: 20 CAPSULE | Refills: 0 | Status: SHIPPED | OUTPATIENT
Start: 2022-02-06 | End: 2022-02-16

## 2022-02-06 RX ORDER — ALBUTEROL SULFATE 90 UG/1
2 AEROSOL, METERED RESPIRATORY (INHALATION) EVERY 4 HOURS PRN
Qty: 1 EACH | Refills: 0 | Status: SHIPPED | OUTPATIENT
Start: 2022-02-06 | End: 2022-06-08 | Stop reason: SDUPTHER

## 2022-02-06 ASSESSMENT — ENCOUNTER SYMPTOMS
EYE DISCHARGE: 0
WHEEZING: 0
ABDOMINAL DISTENTION: 0
SHORTNESS OF BREATH: 0
SINUS PRESSURE: 0
COUGH: 1
DIARRHEA: 0
EYE REDNESS: 0
BACK PAIN: 0
VOMITING: 0
SORE THROAT: 0
NAUSEA: 0
EYE PAIN: 0

## 2022-02-06 NOTE — ED PROVIDER NOTES
The history is provided by the patient. Illness   The current episode started 5 to 7 days ago. The onset was gradual. The problem occurs occasionally. The problem has been unchanged. The problem is moderate. Nothing relieves the symptoms. The symptoms are aggravated by activity. Associated symptoms include congestion, cough and URI. Pertinent negatives include no fever, no diarrhea, no nausea, no vomiting, no ear pain, no headaches, no sore throat, no wheezing, no rash, no eye discharge, no eye pain and no eye redness. She has been less active. She has been eating and drinking normally. Urine output has been normal. The last void occurred less than 6 hours ago. There were no sick contacts. She has received no recent medical care. Review of Systems   Constitutional: Positive for activity change. Negative for chills and fever. HENT: Positive for congestion. Negative for ear pain, sinus pressure and sore throat. Eyes: Negative for pain, discharge and redness. Respiratory: Positive for cough. Negative for shortness of breath and wheezing. Cardiovascular: Negative for chest pain. Gastrointestinal: Negative for abdominal distention, diarrhea, nausea and vomiting. Genitourinary: Negative for dysuria and frequency. Musculoskeletal: Negative for arthralgias and back pain. Skin: Negative for rash and wound. Neurological: Negative for weakness and headaches. Hematological: Negative for adenopathy. Psychiatric/Behavioral: Negative. All other systems reviewed and are negative. Physical Exam  Vitals and nursing note reviewed. Constitutional:       Appearance: She is well-developed. HENT:      Head: Normocephalic and atraumatic. Right Ear: Tympanic membrane normal.      Left Ear: Tympanic membrane normal.      Nose: Congestion and rhinorrhea present. Eyes:      Pupils: Pupils are equal, round, and reactive to light.    Cardiovascular:      Rate and Rhythm: Normal rate and nursing notes within the ED encounter and vital signs as below have been reviewed. BP (!) 143/94   Pulse 100   Temp 96.6 °F (35.9 °C) (Temporal)   Resp 20   Wt (!) 350 lb (158.8 kg)   LMP 01/12/2022   SpO2 97%   BMI 46.18 kg/m²   Oxygen Saturation Interpretation: Normal      ------------------------------------------ PROGRESS NOTES ------------------------------------------   I have spoken with the patient and discussed todays results, in addition to providing specific details for the plan of care and counseling regarding the diagnosis and prognosis. Their questions are answered at this time and they are agreeable with the plan.      --------------------------------- ADDITIONAL PROVIDER NOTES ---------------------------------        This patient is stable for discharge. I have shared the specific conditions for return, as well as the importance of follow-up. IMPRESSION:     1.  Acute bronchitis, unspecified organism      Patient's Medications   New Prescriptions    CEFDINIR (OMNICEF) 300 MG CAPSULE    Take 1 capsule by mouth 2 times daily for 10 days    DOXYCYCLINE HYCLATE (VIBRA-TABS) 100 MG TABLET    Take 1 tablet by mouth 2 times daily for 10 days    METHYLPREDNISOLONE (MEDROL, DEYANIRA,) 4 MG TABLET    USE AS DIRECTED DISPENSE ONE PACK NO REFILLS    PROMETHAZINE-DEXTROMETHORPHAN (PROMETHAZINE-DM) 6.25-15 MG/5ML SYRUP    Take 5 mLs by mouth 4 times daily as needed for Cough   Previous Medications    ASPIRIN LOW DOSE 81 MG EC TABLET    take 1 tablet by mouth once daily    BUSPIRONE (BUSPAR) 15 MG TABLET    take 1 tablet by mouth daily    CRISABOROLE (EUCRISA) 2 % OINT    Apply 1 applicator topically 2 times daily    FLUOXETINE (PROZAC) 40 MG CAPSULE    Take 1 capsule by mouth daily    LABETALOL (NORMODYNE) 100 MG TABLET    take 1 tablet by mouth once daily    LEVOTHYROXINE (SYNTHROID) 50 MCG TABLET    Take 1 tablet by mouth Daily   Modified Medications    Modified Medication Previous Medication ALBUTEROL SULFATE  (90 BASE) MCG/ACT INHALER albuterol sulfate  (90 Base) MCG/ACT inhaler       Inhale 2 puffs into the lungs every 4 hours as needed for Wheezing or Shortness of Breath    Inhale 2 puffs into the lungs every 6 hours as needed for Wheezing   Discontinued Medications    BROMPHENIRAMINE-PSEUDOEPHEDRINE-DM 2-30-10 MG/5ML SYRUP    Take 5 mLs by mouth 4 times daily as needed for Congestion or Cough     Patient states she could not have COVID19, I suggested she be tested and she elected to defer at this time.     Procedures     [facility]                  Corrinne Moynahan, DO  02/06/22 [id number]

## 2022-04-19 ENCOUNTER — HOSPITAL ENCOUNTER (EMERGENCY)
Age: 42
Discharge: HOME | End: 2022-04-19
Attending: EMERGENCY MEDICINE
Payer: COMMERCIAL

## 2022-04-19 VITALS
BODY MASS INDEX: 46.84 KG/M2 | TEMPERATURE: 97.1 F | SYSTOLIC BLOOD PRESSURE: 140 MMHG | RESPIRATION RATE: 18 BRPM | WEIGHT: 293 LBS | DIASTOLIC BLOOD PRESSURE: 79 MMHG | OXYGEN SATURATION: 95 % | HEART RATE: 84 BPM

## 2022-04-19 DIAGNOSIS — J01.10 ACUTE FRONTAL SINUSITIS, RECURRENCE NOT SPECIFIED: Primary | ICD-10-CM

## 2022-04-19 PROCEDURE — 99283 EMERGENCY DEPT VISIT LOW MDM: CPT

## 2022-04-19 RX ORDER — AZITHROMYCIN 250 MG/1
250 TABLET, FILM COATED ORAL SEE ADMIN INSTRUCTIONS
Qty: 6 TABLET | Refills: 0 | Status: SHIPPED | OUTPATIENT
Start: 2022-04-19 | End: 2022-04-24

## 2022-04-19 RX ORDER — GUAIFENESIN 600 MG/1
600 TABLET, EXTENDED RELEASE ORAL 2 TIMES DAILY
Qty: 30 TABLET | Refills: 0 | Status: SHIPPED | OUTPATIENT
Start: 2022-04-19 | End: 2022-05-04

## 2022-04-19 ASSESSMENT — ENCOUNTER SYMPTOMS
COUGH: 0
RHINORRHEA: 1
SINUS PAIN: 1
NAUSEA: 0
DIARRHEA: 0
SORE THROAT: 0
VOMITING: 0
ABDOMINAL DISTENTION: 0
EYE PAIN: 0
WHEEZING: 0
EYE DISCHARGE: 0
SINUS PRESSURE: 1
EYE REDNESS: 0
BACK PAIN: 0
SHORTNESS OF BREATH: 0

## 2022-04-19 NOTE — Clinical Note
Deborah Mello was seen and treated in our emergency department on 4/19/2022. She may return to work on 04/20/2022. If you have any questions or concerns, please don't hesitate to call.       Irma Stoddard, DO

## 2022-04-19 NOTE — ED PROVIDER NOTES
The history is provided by the patient. Illness   The current episode started 2 days ago. The onset was gradual. The problem occurs occasionally. The problem has been unchanged. The problem is mild. Nothing relieves the symptoms. Nothing aggravates the symptoms. Associated symptoms include congestion and rhinorrhea. Pertinent negatives include no fever, no diarrhea, no nausea, no vomiting, no ear pain, no headaches, no sore throat, no cough, no wheezing, no rash, no eye discharge, no eye pain and no eye redness. She has been behaving normally. She has been eating and drinking normally. Urine output has been normal. The last void occurred less than 6 hours ago. There were no sick contacts. She has received no recent medical care. Review of Systems   Constitutional: Negative for chills and fever. HENT: Positive for congestion, rhinorrhea, sinus pressure and sinus pain. Negative for ear pain and sore throat. Eyes: Negative for pain, discharge and redness. Respiratory: Negative for cough, shortness of breath and wheezing. Cardiovascular: Negative for chest pain. Gastrointestinal: Negative for abdominal distention, diarrhea, nausea and vomiting. Genitourinary: Negative for dysuria and frequency. Musculoskeletal: Negative for arthralgias and back pain. Skin: Negative for rash and wound. Neurological: Negative for weakness and headaches. Hematological: Negative for adenopathy. Psychiatric/Behavioral: Negative. All other systems reviewed and are negative. Physical Exam  Vitals and nursing note reviewed. Constitutional:       Appearance: She is well-developed. HENT:      Head: Normocephalic and atraumatic. Right Ear: Tympanic membrane normal.      Left Ear: Tympanic membrane normal.      Nose: Congestion and rhinorrhea present. Right Sinus: Frontal sinus tenderness present. Left Sinus: Frontal sinus tenderness present.       Mouth/Throat:      Pharynx: Posterior oropharyngeal erythema present. Eyes:      Pupils: Pupils are equal, round, and reactive to light. Cardiovascular:      Rate and Rhythm: Normal rate and regular rhythm. Heart sounds: Normal heart sounds. No murmur heard. Pulmonary:      Effort: Pulmonary effort is normal.      Breath sounds: Normal breath sounds. Abdominal:      General: Bowel sounds are normal.      Palpations: Abdomen is soft. Tenderness: There is no abdominal tenderness. There is no guarding or rebound. Musculoskeletal:      Cervical back: Normal range of motion and neck supple. Skin:     General: Skin is warm and dry. Neurological:      Mental Status: She is alert and oriented to person, place, and time. Psychiatric:         Behavior: Behavior normal.         Thought Content: Thought content normal.         Judgment: Judgment normal.        --------------------------------------------- PAST HISTORY ---------------------------------------------  Past Medical History:  has a past medical history of Abnormal Pap smear of cervix, CAD (coronary artery disease), Chronic back pain, Degenerative disc disease at L5-S1 level, Depression, Edema, Gestational diabetes mellitus (GDM) in third trimester controlled on oral hypoglycemic drug, Hypertension, Rh incompatibility, Rh sensitized, and Thyroid disease. Past Surgical History:  has a past surgical history that includes back surgery; Vein Surgery (Bilateral); and LEEP. Social History:  reports that she has been smoking cigarettes. She has a 11.00 pack-year smoking history. She has never used smokeless tobacco. She reports previous alcohol use. She reports that she does not use drugs. Family History: family history is not on file. The patients home medications have been reviewed. Allergies: Patient has no known allergies.     -------------------------------------------------- RESULTS -------------------------------------------------  No results found for this visit on 04/19/22. No orders to display       ------------------------- NURSING NOTES AND VITALS REVIEWED ---------------------------   The nursing notes within the ED encounter and vital signs as below have been reviewed. BP (!) 140/79   Pulse 84   Temp 97.1 °F (36.2 °C) (Temporal)   Resp 18   Wt (!) 355 lb (161 kg)   LMP 03/24/2022   SpO2 95%   BMI 46.84 kg/m²   Oxygen Saturation Interpretation: Normal      ------------------------------------------ PROGRESS NOTES ------------------------------------------   I have spoken with the patient and discussed todays results, in addition to providing specific details for the plan of care and counseling regarding the diagnosis and prognosis. Their questions are answered at this time and they are agreeable with the plan.      --------------------------------- ADDITIONAL PROVIDER NOTES ---------------------------------        This patient is stable for discharge. I have shared the specific conditions for return, as well as the importance of follow-up. IMPRESSION:     1.  Acute frontal sinusitis, recurrence not specified      Patient's Medications   New Prescriptions    AZITHROMYCIN (ZITHROMAX) 250 MG TABLET    Take 1 tablet by mouth See Admin Instructions for 5 days 500mg on day 1 followed by 250mg on days 2 - 5    GUAIFENESIN (MUCINEX) 600 MG EXTENDED RELEASE TABLET    Take 1 tablet by mouth 2 times daily for 15 days   Previous Medications    ALBUTEROL SULFATE  (90 BASE) MCG/ACT INHALER    Inhale 2 puffs into the lungs every 4 hours as needed for Wheezing or Shortness of Breath    ASPIRIN LOW DOSE 81 MG EC TABLET    take 1 tablet by mouth once daily    BUSPIRONE (BUSPAR) 15 MG TABLET    take 1 tablet by mouth daily    FLUOXETINE (PROZAC) 40 MG CAPSULE    Take 1 capsule by mouth daily    LABETALOL (NORMODYNE) 100 MG TABLET    take 1 tablet by mouth once daily    LEVOTHYROXINE (SYNTHROID) 50 MCG TABLET    Take 1 tablet by mouth Daily   Modified Medications    No medications on file   Discontinued Medications    CRISABOROLE (EUCRISA) 2 % OINT    Apply 1 applicator topically 2 times daily         Procedures     [facility]                  Marky Bee,   04/19/22 1991

## 2022-05-05 ENCOUNTER — TELEPHONE (OUTPATIENT)
Dept: FAMILY MEDICINE CLINIC | Age: 42
End: 2022-05-05

## 2022-05-05 NOTE — TELEPHONE ENCOUNTER
Pt called in reporting bp of 195/114    She states she has been taking the bp med. (we have not sent since January)     Advised to proceed to ED. We close in 15 minutes, BP that high needs addressed today. She said she would try to go, she is at work. Advised again go to ED, call in AM to f/u.

## 2022-05-16 DIAGNOSIS — E03.8 HYPOTHYROIDISM DUE TO HASHIMOTO'S THYROIDITIS: ICD-10-CM

## 2022-05-16 DIAGNOSIS — E06.3 HYPOTHYROIDISM DUE TO HASHIMOTO'S THYROIDITIS: ICD-10-CM

## 2022-05-17 RX ORDER — LEVOTHYROXINE SODIUM 0.05 MG/1
TABLET ORAL
Qty: 14 TABLET | Refills: 0 | Status: SHIPPED
Start: 2022-05-17 | End: 2022-06-08 | Stop reason: SDUPTHER

## 2022-06-08 ENCOUNTER — OFFICE VISIT (OUTPATIENT)
Dept: FAMILY MEDICINE CLINIC | Age: 42
End: 2022-06-08
Payer: COMMERCIAL

## 2022-06-08 VITALS
WEIGHT: 293 LBS | RESPIRATION RATE: 16 BRPM | HEART RATE: 105 BPM | DIASTOLIC BLOOD PRESSURE: 74 MMHG | HEIGHT: 72 IN | OXYGEN SATURATION: 99 % | SYSTOLIC BLOOD PRESSURE: 138 MMHG | TEMPERATURE: 97 F | BODY MASS INDEX: 39.68 KG/M2

## 2022-06-08 DIAGNOSIS — G89.29 CHRONIC BILATERAL LOW BACK PAIN WITH BILATERAL SCIATICA: Primary | ICD-10-CM

## 2022-06-08 DIAGNOSIS — I10 ESSENTIAL HYPERTENSION: ICD-10-CM

## 2022-06-08 DIAGNOSIS — F41.9 ANXIETY: ICD-10-CM

## 2022-06-08 DIAGNOSIS — M54.41 CHRONIC BILATERAL LOW BACK PAIN WITH BILATERAL SCIATICA: Primary | ICD-10-CM

## 2022-06-08 DIAGNOSIS — M54.42 CHRONIC BILATERAL LOW BACK PAIN WITH BILATERAL SCIATICA: Primary | ICD-10-CM

## 2022-06-08 DIAGNOSIS — R53.83 FATIGUE, UNSPECIFIED TYPE: ICD-10-CM

## 2022-06-08 DIAGNOSIS — B02.9 HERPES ZOSTER WITHOUT COMPLICATION: ICD-10-CM

## 2022-06-08 DIAGNOSIS — E03.8 HYPOTHYROIDISM DUE TO HASHIMOTO'S THYROIDITIS: ICD-10-CM

## 2022-06-08 DIAGNOSIS — E06.3 HYPOTHYROIDISM DUE TO HASHIMOTO'S THYROIDITIS: ICD-10-CM

## 2022-06-08 DIAGNOSIS — F32.A DEPRESSION, UNSPECIFIED DEPRESSION TYPE: ICD-10-CM

## 2022-06-08 PROCEDURE — G8427 DOCREV CUR MEDS BY ELIG CLIN: HCPCS | Performed by: NURSE PRACTITIONER

## 2022-06-08 PROCEDURE — G8417 CALC BMI ABV UP PARAM F/U: HCPCS | Performed by: NURSE PRACTITIONER

## 2022-06-08 PROCEDURE — 99214 OFFICE O/P EST MOD 30 MIN: CPT | Performed by: NURSE PRACTITIONER

## 2022-06-08 PROCEDURE — 4004F PT TOBACCO SCREEN RCVD TLK: CPT | Performed by: NURSE PRACTITIONER

## 2022-06-08 RX ORDER — ALBUTEROL SULFATE 90 UG/1
2 AEROSOL, METERED RESPIRATORY (INHALATION) EVERY 4 HOURS PRN
Qty: 1 EACH | Refills: 0 | Status: SHIPPED | OUTPATIENT
Start: 2022-06-08

## 2022-06-08 RX ORDER — FLUOXETINE HYDROCHLORIDE 40 MG/1
40 CAPSULE ORAL DAILY
Qty: 30 CAPSULE | Refills: 3 | Status: SHIPPED
Start: 2022-06-08 | End: 2022-10-12

## 2022-06-08 RX ORDER — GABAPENTIN 100 MG/1
100 CAPSULE ORAL 2 TIMES DAILY
Qty: 60 CAPSULE | Refills: 0 | Status: SHIPPED
Start: 2022-06-08 | End: 2022-07-13 | Stop reason: SINTOL

## 2022-06-08 RX ORDER — ACYCLOVIR 800 MG/1
800 TABLET ORAL
Qty: 35 TABLET | Refills: 0 | Status: SHIPPED | OUTPATIENT
Start: 2022-06-08 | End: 2022-06-15

## 2022-06-08 RX ORDER — METHYLPREDNISOLONE 4 MG/1
TABLET ORAL
Qty: 1 KIT | Refills: 0 | Status: SHIPPED
Start: 2022-06-08 | End: 2022-07-13 | Stop reason: ALTCHOICE

## 2022-06-08 RX ORDER — BUSPIRONE HYDROCHLORIDE 15 MG/1
TABLET ORAL
Qty: 60 TABLET | Refills: 0 | Status: SHIPPED
Start: 2022-06-08 | End: 2022-08-31 | Stop reason: CLARIF

## 2022-06-08 RX ORDER — LEVOTHYROXINE SODIUM 0.05 MG/1
TABLET ORAL
Qty: 30 TABLET | Refills: 3 | Status: SHIPPED
Start: 2022-06-08 | End: 2022-10-12

## 2022-06-08 ASSESSMENT — PATIENT HEALTH QUESTIONNAIRE - PHQ9
3. TROUBLE FALLING OR STAYING ASLEEP: 1
4. FEELING TIRED OR HAVING LITTLE ENERGY: 0
10. IF YOU CHECKED OFF ANY PROBLEMS, HOW DIFFICULT HAVE THESE PROBLEMS MADE IT FOR YOU TO DO YOUR WORK, TAKE CARE OF THINGS AT HOME, OR GET ALONG WITH OTHER PEOPLE: 0
6. FEELING BAD ABOUT YOURSELF - OR THAT YOU ARE A FAILURE OR HAVE LET YOURSELF OR YOUR FAMILY DOWN: 0
SUM OF ALL RESPONSES TO PHQ QUESTIONS 1-9: 1
5. POOR APPETITE OR OVEREATING: 0
9. THOUGHTS THAT YOU WOULD BE BETTER OFF DEAD, OR OF HURTING YOURSELF: 0
SUM OF ALL RESPONSES TO PHQ QUESTIONS 1-9: 1
SUM OF ALL RESPONSES TO PHQ QUESTIONS 1-9: 1
8. MOVING OR SPEAKING SO SLOWLY THAT OTHER PEOPLE COULD HAVE NOTICED. OR THE OPPOSITE, BEING SO FIGETY OR RESTLESS THAT YOU HAVE BEEN MOVING AROUND A LOT MORE THAN USUAL: 0
SUM OF ALL RESPONSES TO PHQ QUESTIONS 1-9: 1
SUM OF ALL RESPONSES TO PHQ9 QUESTIONS 1 & 2: 0
7. TROUBLE CONCENTRATING ON THINGS, SUCH AS READING THE NEWSPAPER OR WATCHING TELEVISION: 0
2. FEELING DOWN, DEPRESSED OR HOPELESS: 0
1. LITTLE INTEREST OR PLEASURE IN DOING THINGS: 0

## 2022-06-08 ASSESSMENT — ENCOUNTER SYMPTOMS
DIARRHEA: 0
NAUSEA: 0
WHEEZING: 0
BACK PAIN: 1
VOMITING: 0
CONSTIPATION: 0
COUGH: 0
SHORTNESS OF BREATH: 0

## 2022-06-08 NOTE — PROGRESS NOTES
Abhijeet Auguste (:  1980) is a 46 y.o. female,Established patient, here for evaluation of the following chief complaint(s):  Hypertension (has been out of labetalol for a couple of months, made her feel strange. monitors bp at home, has not been out of range. ), Insomnia (difficulty sleeping ), and Pain (back pain, neuropathy, shingles outbreak. )         ASSESSMENT/PLAN:  1. Chronic bilateral low back pain with bilateral sciatica  -     [facility] [place]  -     gabapentin (NEURONTIN) 100 MG capsule; Take 1 capsule by mouth 2 times daily for 30 days. Intended supply: 30 days, Disp-60 capsule, R-0Normal  History of back surgery with Dr Flor Haley     2. Hypothyroidism due to Hashimoto's thyroiditis  -     levothyroxine (SYNTHROID) 50 MCG tablet; take 1 tablet by mouth once daily, Disp-30 tablet, R-3Normal  -     TSH; Future  -     T4, Free; Future    3. Essential hypertension  -     Comprehensive Metabolic Panel; Future  Returned to baseline post partum, ok to stop labetolol   Monitor BP at home call if > 140/90    4. Depression, unspecified depression type  -     FLUoxetine (PROZAC) 40 MG capsule; Take 1 capsule by mouth daily, Disp-30 capsule, R-3Normal  Will attempt to treat pain and anxiety and see if depression improves    5. Anxiety  -     busPIRone (BUSPAR) 15 MG tablet; take 1 tablet by mouth twice daily as needed for anxiety, Disp-60 tablet, R-0Normal  Dose adjusted due to poor control    6. Herpes zoster without complication  -     methylPREDNISolone (MEDROL, DEYANIRA,) 4 MG tablet; Take by mouth as directed, Disp-1 kit, R-0Normal  -     acyclovir (ZOVIRAX) 800 MG tablet; Take 1 tablet by mouth 5 times daily for 7 days, Disp-35 tablet, R-0Normal  Will consider vaccine due to recurrence    7.  Fatigue, unspecified type  -     CBC with Auto Differential; Future    Controlled Substance Monitoring:    Acute and Chronic Pain Monitoring:   RX Monitoring 2022   Periodic Controlled Substance Monitoring No signs of potential drug abuse or diversion identified. No follow-ups on file. Subjective   SUBJECTIVE/OBJECTIVE:  Patient has been off labetolol for 2 months. Was on only due to high BP while pregnant. She does monitor her BP at times and it has been < 140/90. Complains of chronic pain to low back. States history of stenosis and neuropathy. Not on any medication. States she sits a lot for work. She previously saw Dr Yaneli Harris and had back surgery in 2004. (Dr Sarmad Hernandez) Has also had injections. States she has had intermittent shingles outbreaks always right upper buttock. Complains of anxiety. States no longer feels well controlled with buspar and prozac. States has depression and aggravation as well. Complains of insomnia and feels it is due to her pain      Review of Systems   Constitutional: Positive for fatigue. Negative for activity change, appetite change and unexpected weight change. Respiratory: Negative for cough, shortness of breath and wheezing. Cardiovascular: Negative for chest pain and palpitations. Gastrointestinal: Negative for constipation, diarrhea, nausea and vomiting. Musculoskeletal: Positive for arthralgias (hands ) and back pain. Skin: Positive for rash (right upper buttock). Neurological: Positive for numbness (legs, arms and hands). Negative for weakness, light-headedness and headaches. Psychiatric/Behavioral: Positive for dysphoric mood and sleep disturbance. The patient is nervous/anxious. Objective   /74   Pulse (!) 105   Temp 97 °F (36.1 °C) (Temporal)   Resp 16   Ht 6' 1\" (1.854 m)   Wt (!) 338 lb (153.3 kg)   LMP 05/11/2022   SpO2 99%   BMI 44.59 kg/m²    Physical Exam  Constitutional:       General: She is not in acute distress. Appearance: Normal appearance. She is well-developed. HENT:      Head: Normocephalic and atraumatic. Neck:      Thyroid: No thyromegaly. Trachea: No tracheal deviation. Cardiovascular:      Rate and Rhythm: Normal rate and regular rhythm. Pulses: Normal pulses. Heart sounds: Normal heart sounds. No murmur heard. Pulmonary:      Effort: Pulmonary effort is normal. No respiratory distress. Breath sounds: Normal breath sounds. No wheezing, rhonchi or rales. Chest:      Chest wall: No tenderness. Abdominal:      General: Bowel sounds are normal.      Palpations: Abdomen is soft. Tenderness: There is no abdominal tenderness. Musculoskeletal:         General: Tenderness (lumbar spine with bilateral paraspinal tendenress) present. Comments:  ROM limited due to pain. Negative SLR bilateral. Upper and lower extremities equal and strong. Lymphadenopathy:      Cervical: No cervical adenopathy. Skin:     General: Skin is warm and dry. Findings: Rash (erythematous vesicular rash to right buttock) present. Neurological:      Mental Status: She is alert and oriented to person, place, and time. Psychiatric:         Mood and Affect: Mood normal.         Behavior: Behavior normal.          [facility] moderate      An electronic signature was used to authenticate this note.     --MARY Peterson - CNP

## 2022-06-15 ENCOUNTER — HOSPITAL ENCOUNTER (OUTPATIENT)
Age: 42
Discharge: HOME | End: 2022-06-15
Payer: COMMERCIAL

## 2022-06-15 DIAGNOSIS — I10 ESSENTIAL HYPERTENSION: ICD-10-CM

## 2022-06-15 DIAGNOSIS — R53.83 FATIGUE, UNSPECIFIED TYPE: ICD-10-CM

## 2022-06-15 DIAGNOSIS — E03.8 HYPOTHYROIDISM DUE TO HASHIMOTO'S THYROIDITIS: ICD-10-CM

## 2022-06-15 DIAGNOSIS — E06.3 HYPOTHYROIDISM DUE TO HASHIMOTO'S THYROIDITIS: ICD-10-CM

## 2022-06-15 LAB
ALBUMIN SERPL-MCNC: 4.1 G/DL (ref 3.5–5.2)
ALP BLD-CCNC: 93 U/L (ref 35–104)
ALT SERPL-CCNC: 35 U/L (ref 0–32)
ANION GAP SERPL CALCULATED.3IONS-SCNC: 9 MMOL/L (ref 7–16)
AST SERPL-CCNC: 15 U/L (ref 0–31)
ATYPICAL LYMPHOCYTE RELATIVE PERCENT: 7 % (ref 0–4)
BASOPHILS ABSOLUTE: 0 E9/L (ref 0–0.2)
BASOPHILS RELATIVE PERCENT: 0 % (ref 0–2)
BILIRUB SERPL-MCNC: 0.4 MG/DL (ref 0–1.2)
BUN BLDV-MCNC: 16 MG/DL (ref 6–20)
CALCIUM SERPL-MCNC: 9.2 MG/DL (ref 8.6–10.2)
CHLORIDE BLD-SCNC: 103 MMOL/L (ref 98–107)
CO2: 26 MMOL/L (ref 22–29)
CREAT SERPL-MCNC: 1.3 MG/DL (ref 0.5–1)
EOSINOPHILS ABSOLUTE: 0.3 E9/L (ref 0.05–0.5)
EOSINOPHILS RELATIVE PERCENT: 2 % (ref 0–6)
GFR AFRICAN AMERICAN: 55
GFR NON-AFRICAN AMERICAN: 45 ML/MIN/1.73
GLUCOSE BLD-MCNC: 130 MG/DL (ref 74–99)
HCT VFR BLD CALC: 47.6 % (ref 34–48)
HEMOGLOBIN: 15.5 G/DL (ref 11.5–15.5)
LYMPHOCYTES ABSOLUTE: 3.28 E9/L (ref 1.5–4)
LYMPHOCYTES RELATIVE PERCENT: 15 % (ref 20–42)
MCH RBC QN AUTO: 28.8 PG (ref 26–35)
MCHC RBC AUTO-ENTMCNC: 32.6 % (ref 32–34.5)
MCV RBC AUTO: 88.5 FL (ref 80–99.9)
MONOCYTES ABSOLUTE: 1.19 E9/L (ref 0.1–0.95)
MONOCYTES RELATIVE PERCENT: 8 % (ref 2–12)
NEUTROPHILS ABSOLUTE: 10.13 E9/L (ref 1.8–7.3)
NEUTROPHILS RELATIVE PERCENT: 68 % (ref 43–80)
PDW BLD-RTO: 14 FL (ref 11.5–15)
PLATELET # BLD: 409 E9/L (ref 130–450)
PMV BLD AUTO: 9.1 FL (ref 7–12)
POTASSIUM SERPL-SCNC: 3.9 MMOL/L (ref 3.5–5)
RBC # BLD: 5.38 E12/L (ref 3.5–5.5)
RBC # BLD: NORMAL 10*6/UL
SODIUM BLD-SCNC: 138 MMOL/L (ref 132–146)
T4 FREE: 1.35 NG/DL (ref 0.93–1.7)
TOTAL PROTEIN: 7 G/DL (ref 6.4–8.3)
TSH SERPL DL<=0.05 MIU/L-ACNC: 1.91 UIU/ML (ref 0.27–4.2)
WBC # BLD: 14.9 E9/L (ref 4.5–11.5)

## 2022-06-15 PROCEDURE — 36415 COLL VENOUS BLD VENIPUNCTURE: CPT

## 2022-06-15 PROCEDURE — 84443 ASSAY THYROID STIM HORMONE: CPT

## 2022-06-15 PROCEDURE — 84439 ASSAY OF FREE THYROXINE: CPT

## 2022-06-15 PROCEDURE — 85025 COMPLETE CBC W/AUTO DIFF WBC: CPT

## 2022-06-15 PROCEDURE — 80053 COMPREHEN METABOLIC PANEL: CPT

## 2022-06-17 DIAGNOSIS — R73.09 ELEVATED GLUCOSE LEVEL: Primary | ICD-10-CM

## 2022-07-13 ENCOUNTER — OFFICE VISIT (OUTPATIENT)
Dept: FAMILY MEDICINE CLINIC | Age: 42
End: 2022-07-13
Payer: COMMERCIAL

## 2022-07-13 VITALS
DIASTOLIC BLOOD PRESSURE: 80 MMHG | OXYGEN SATURATION: 99 % | HEIGHT: 72 IN | WEIGHT: 293 LBS | SYSTOLIC BLOOD PRESSURE: 130 MMHG | BODY MASS INDEX: 39.68 KG/M2 | TEMPERATURE: 97.3 F | RESPIRATION RATE: 16 BRPM | HEART RATE: 62 BPM

## 2022-07-13 DIAGNOSIS — G89.29 CHRONIC BILATERAL LOW BACK PAIN WITH BILATERAL SCIATICA: ICD-10-CM

## 2022-07-13 DIAGNOSIS — F41.9 ANXIETY: ICD-10-CM

## 2022-07-13 DIAGNOSIS — R73.09 ELEVATED GLUCOSE LEVEL: Primary | ICD-10-CM

## 2022-07-13 DIAGNOSIS — M54.42 CHRONIC BILATERAL LOW BACK PAIN WITH BILATERAL SCIATICA: ICD-10-CM

## 2022-07-13 DIAGNOSIS — R79.89 ELEVATED SERUM CREATININE: ICD-10-CM

## 2022-07-13 DIAGNOSIS — M54.41 CHRONIC BILATERAL LOW BACK PAIN WITH BILATERAL SCIATICA: ICD-10-CM

## 2022-07-13 DIAGNOSIS — M25.852 MASS OF JOINT OF LEFT HIP: ICD-10-CM

## 2022-07-13 LAB — HBA1C MFR BLD: 5.6 %

## 2022-07-13 PROCEDURE — 83036 HEMOGLOBIN GLYCOSYLATED A1C: CPT | Performed by: NURSE PRACTITIONER

## 2022-07-13 PROCEDURE — G8417 CALC BMI ABV UP PARAM F/U: HCPCS | Performed by: NURSE PRACTITIONER

## 2022-07-13 PROCEDURE — 4004F PT TOBACCO SCREEN RCVD TLK: CPT | Performed by: NURSE PRACTITIONER

## 2022-07-13 PROCEDURE — 99213 OFFICE O/P EST LOW 20 MIN: CPT | Performed by: NURSE PRACTITIONER

## 2022-07-13 PROCEDURE — G8427 DOCREV CUR MEDS BY ELIG CLIN: HCPCS | Performed by: NURSE PRACTITIONER

## 2022-07-13 RX ORDER — ACYCLOVIR 800 MG/1
800 TABLET ORAL
COMMUNITY

## 2022-07-13 RX ORDER — PREGABALIN 75 MG/1
75 CAPSULE ORAL 2 TIMES DAILY
Qty: 60 CAPSULE | Refills: 0 | Status: SHIPPED
Start: 2022-07-13 | End: 2022-08-23 | Stop reason: SDUPTHER

## 2022-07-13 RX ORDER — HYDROXYZINE HYDROCHLORIDE 25 MG/1
25 TABLET, FILM COATED ORAL EVERY 8 HOURS PRN
Qty: 30 TABLET | Refills: 0 | Status: SHIPPED
Start: 2022-07-13 | End: 2022-08-12 | Stop reason: SDUPTHER

## 2022-07-13 SDOH — ECONOMIC STABILITY: FOOD INSECURITY: WITHIN THE PAST 12 MONTHS, YOU WORRIED THAT YOUR FOOD WOULD RUN OUT BEFORE YOU GOT MONEY TO BUY MORE.: NEVER TRUE

## 2022-07-13 SDOH — ECONOMIC STABILITY: FOOD INSECURITY: WITHIN THE PAST 12 MONTHS, THE FOOD YOU BOUGHT JUST DIDN'T LAST AND YOU DIDN'T HAVE MONEY TO GET MORE.: NEVER TRUE

## 2022-07-13 ASSESSMENT — ENCOUNTER SYMPTOMS
SHORTNESS OF BREATH: 0
COUGH: 0
BACK PAIN: 1
CONSTIPATION: 0
VOMITING: 0
DIARRHEA: 0
WHEEZING: 0
NAUSEA: 0

## 2022-07-13 ASSESSMENT — SOCIAL DETERMINANTS OF HEALTH (SDOH): HOW HARD IS IT FOR YOU TO PAY FOR THE VERY BASICS LIKE FOOD, HOUSING, MEDICAL CARE, AND HEATING?: SOMEWHAT HARD

## 2022-07-13 NOTE — PROGRESS NOTES
Logan Huber (:  1980) is a 38 y.o. female,Established patient, here for evaluation of the following chief complaint(s):  Discuss Medications and Results (discuss labs)         ASSESSMENT/PLAN:  1. Elevated glucose level  -     POCT glycosylated hemoglobin (Hb A1C)  2. Chronic bilateral low back pain with bilateral sciatica  -     pregabalin (LYRICA) 75 MG capsule; Take 1 capsule by mouth 2 times daily for 30 days. , Disp-60 capsule, R-0Normal  - Reviewed side effects of medication and patient verbalizes understanding.   - Advised to call back directly if there are further questions, or if these symptoms fail to improve as anticipated or worsen. - F/u with pain management as scheduled  3. Anxiety  -     hydrOXYzine HCl (ATARAX) 25 MG tablet; Take 1 tablet by mouth every 8 hours as needed for Itching, Disp-30 tablet, R-0Normal  - Reviewed side effects of medication and patient verbalizes understanding.   - Advised to call back directly if there are further questions, or if these symptoms fail to improve as anticipated or worsen.  - STOP Buspar  - Will consider switching to another SSRI/SNRI at next visit  4. Elevated serum creatinine  -     Comprehensive Metabolic Panel; Future  5. Mass of joint of left hip  -     [facility]; Future      Return in about 4 weeks (around 8/10/2022), or if symptoms worsen or fail to improve, for med review. Subjective   SUBJECTIVE/OBJECTIVE:  HPI  Here today for A1c due to elevated glucose on recent CMP. Patient states she remember that she had some OJ that morning. A1c today 5.6%. Serum creatinine is elevated. Will repeat. Patient reports she had a UTI at the time. Started on gabapentin at last visit. Too many side effects to tolerate. Would like to try something different. Feeing pain management soon. Also reports that the buspar is not helping very much anxiety.  She also thinks that the prozac is not working very well and would like to consider trying something else. /80 (Site: Right Upper Arm)   Pulse 62   Temp 97.3 °F (36.3 °C)   Resp 16   Ht 6' 1\" (1.854 m)   Wt (!) 349 lb (158.3 kg)   LMP 07/03/2022 (Exact Date)   SpO2 99%   BMI 46.04 kg/m²     Review of Systems   Constitutional: Negative for activity change, appetite change, chills, diaphoresis, fever and unexpected weight change. Respiratory: Negative for cough, shortness of breath and wheezing. Cardiovascular: Negative for chest pain and palpitations. Gastrointestinal: Negative for constipation, diarrhea, nausea and vomiting. Genitourinary: Negative for difficulty urinating. Musculoskeletal: Positive for back pain. Negative for arthralgias and neck pain. Neurological: Negative for dizziness, weakness and headaches. Psychiatric/Behavioral: Positive for dysphoric mood and sleep disturbance. The patient is nervous/anxious. Objective   Physical Exam  Constitutional:       Appearance: She is well-developed. She is obese. HENT:      Head: Normocephalic and atraumatic. Neck:      Thyroid: No thyromegaly. Trachea: No tracheal deviation. Cardiovascular:      Rate and Rhythm: Normal rate and regular rhythm. Heart sounds: No murmur heard. Pulmonary:      Effort: Pulmonary effort is normal. No respiratory distress. Breath sounds: Normal breath sounds. Abdominal:      General: Bowel sounds are normal.      Palpations: Abdomen is soft. Tenderness: There is no abdominal tenderness. Musculoskeletal:         General: Tenderness present. Normal range of motion. Comments: Lumbar spine   Lymphadenopathy:      Cervical: No cervical adenopathy. Skin:     General: Skin is warm and dry. Neurological:      Mental Status: She is alert and oriented to person, place, and time. Psychiatric:         Behavior: Behavior normal.            [facility]      An electronic signature was used to authenticate this note.     --MARY Rico - CNP

## 2022-08-05 ENCOUNTER — OFFICE VISIT (OUTPATIENT)
Dept: PAIN MANAGEMENT | Age: 42
End: 2022-08-05
Payer: COMMERCIAL

## 2022-08-05 VITALS
HEART RATE: 81 BPM | SYSTOLIC BLOOD PRESSURE: 112 MMHG | OXYGEN SATURATION: 98 % | DIASTOLIC BLOOD PRESSURE: 80 MMHG | WEIGHT: 293 LBS | BODY MASS INDEX: 39.68 KG/M2 | HEIGHT: 72 IN | TEMPERATURE: 97.3 F

## 2022-08-05 DIAGNOSIS — R73.09 ELEVATED GLUCOSE LEVEL: ICD-10-CM

## 2022-08-05 DIAGNOSIS — E66.9 OBESITY, UNSPECIFIED CLASSIFICATION, UNSPECIFIED OBESITY TYPE, UNSPECIFIED WHETHER SERIOUS COMORBIDITY PRESENT: ICD-10-CM

## 2022-08-05 DIAGNOSIS — F12.91 HISTORY OF MARIJUANA USE: ICD-10-CM

## 2022-08-05 DIAGNOSIS — M51.36 DDD (DEGENERATIVE DISC DISEASE), LUMBAR: ICD-10-CM

## 2022-08-05 DIAGNOSIS — Z98.890 S/P LUMBAR DISCECTOMY: Primary | ICD-10-CM

## 2022-08-05 DIAGNOSIS — M47.817 LUMBOSACRAL SPONDYLOSIS WITHOUT MYELOPATHY: ICD-10-CM

## 2022-08-05 DIAGNOSIS — R79.89 ELEVATED SERUM CREATININE: ICD-10-CM

## 2022-08-05 LAB
ALBUMIN SERPL-MCNC: 4.2 G/DL (ref 3.5–5.2)
ALP BLD-CCNC: 83 U/L (ref 35–104)
ALT SERPL-CCNC: <5 U/L (ref 0–32)
ANION GAP SERPL CALCULATED.3IONS-SCNC: 12 MMOL/L (ref 7–16)
AST SERPL-CCNC: 8 U/L (ref 0–31)
BILIRUB SERPL-MCNC: 0.3 MG/DL (ref 0–1.2)
BUN BLDV-MCNC: 8 MG/DL (ref 6–20)
CALCIUM SERPL-MCNC: 9.4 MG/DL (ref 8.6–10.2)
CHLORIDE BLD-SCNC: 104 MMOL/L (ref 98–107)
CO2: 23 MMOL/L (ref 22–29)
CREAT SERPL-MCNC: 1.2 MG/DL (ref 0.5–1)
GFR AFRICAN AMERICAN: >60
GFR NON-AFRICAN AMERICAN: 50 ML/MIN/1.73
GLUCOSE BLD-MCNC: 95 MG/DL (ref 74–99)
HBA1C MFR BLD: 5.6 % (ref 4–5.6)
POTASSIUM SERPL-SCNC: 3.8 MMOL/L (ref 3.5–5)
SODIUM BLD-SCNC: 139 MMOL/L (ref 132–146)
TOTAL PROTEIN: 7.2 G/DL (ref 6.4–8.3)

## 2022-08-05 PROCEDURE — 99204 OFFICE O/P NEW MOD 45 MIN: CPT | Performed by: ANESTHESIOLOGY

## 2022-08-05 PROCEDURE — G8417 CALC BMI ABV UP PARAM F/U: HCPCS | Performed by: ANESTHESIOLOGY

## 2022-08-05 PROCEDURE — 4004F PT TOBACCO SCREEN RCVD TLK: CPT | Performed by: ANESTHESIOLOGY

## 2022-08-05 PROCEDURE — G8427 DOCREV CUR MEDS BY ELIG CLIN: HCPCS | Performed by: ANESTHESIOLOGY

## 2022-08-05 RX ORDER — GABAPENTIN 100 MG/1
100 CAPSULE ORAL 2 TIMES DAILY
COMMUNITY
End: 2022-08-31 | Stop reason: CLARIF

## 2022-08-05 NOTE — PROGRESS NOTES
Patient:  LING Salmon 1980  Date of Service:  22      Do you currently have any of the following:    Fever: No  Headache:  No  Cough: No  Shortness of breath: No  Exposed to anyone with these symptoms: No       Patient presents with complaints of back pain that started 10 years ago and has been getting worse. She states the pain began following 1st birth of child in . Pain is constant and is described as aching, shooting, and sharp. She rates the pain as a 10/10 on her worst day , 6/10 on her best day, and a 8/10 on average on the VAS scale. Pain does radiate to both lower extremities. She  has numbness of the both lower extremities. Alleviating factors include: Gabapentin. Aggravating factors include:  movement, lying down. She states that the pain does keep her from sleeping at night. She took her last dose of Neurontin yesterday. She is  on NSAIDS and  is  on anticoagulation medications to include ASA and is managed by MARY Manuel CNP  . Previous treatments: Physical Therapy, Discectomy, and medications. .      Personal Expectations from this treatment: decrease pain    There were no vitals taken for this visit. No LMP recorded.

## 2022-08-05 NOTE — PROGRESS NOTES
[address]        [address] [facility], [address] [facility] [place]      [phone]                  Consult Note      Patient:  LING Flores 1980    Date of Service:  22     Requesting Physician:  MARY Rodriguez CNP      Reason for Consult:      Patient presents with complaints of low back pain    HISTORY OF PRESENT ILLNESS:      Ms. Marianna Stanley is a 38 y.o. female [facility] of  chronic low back pain. Prior Lumbar spine surgery - s/p discectomy in  by Dr. Luma Dawson- had helped very well with the LE pain. Current pain in the low back - lumbar area- denies significant LE radiation. H/o LE swelling (venous insufficieny)- gets tingling of the feet. Pain is constant and is described as aching and throbbing. Patient does not have new bladder or bowel dysfunction. Alleviating factors include: rest.  Aggravating factors include: movement, bending, lifting. Pain causes functional limitations/ limits Adl's : Yes    Nursing notes and details of the pain history reviewed. Please see intake notes for details. NOTE:  Has been treated by Dr. Jayme White few years ago- had interventions/ pain meds/ PT. Apparently Dc'd due to failure to appear for pill count. Previous treatments:   Physical Therapy : yes     Medications: - NSAID's : yes             - Membrane stabilizers : yes             - Opioids : no            - Adjuvants or Others : yes    Spine Surgeries: yes, Lumbar discectomy/ lami in     Anticoagulation medications: yes, and include ASA-81 mg    H/O Smoking: yes  H/O alcohol abuse : denies  H/O Illicit drug use : H/o regular THC use +  Priro UDS + for cannabinoids. 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 Imaging:     MRI of LS spine:  2011: IMPRESSION:     1.   There is a right-sided L5/S1 disk protrusion which produces mild   posterior displacement of the S1 nerve root on the right. This is the   site of the previous surgery where a much larger disk protrusion was   noted in 2004. It is impossible to discern whether this represents   recurrent or residual herniated disk material     Transcribe Date/Time: Feb 17 2011 10:56A     Past Medical History:   Diagnosis Date    Abnormal Pap smear of cervix     CAD (coronary artery disease)     SVT at time    Chronic back pain     Degenerative disc disease at L5-S1 level     Depression     Edema     Gestational diabetes mellitus (GDM) in third trimester controlled on oral hypoglycemic drug 06/10/2020    Hypertension     with just this pregnancies    Rh incompatibility     Rh sensitized     Thyroid disease     Thyroid disease        Past Surgical History:   Procedure Laterality Date    BACK SURGERY      LEEP      VEIN SURGERY Bilateral     stents       Prior to Admission medications    Medication Sig Start Date End Date Taking? Authorizing Provider   gabapentin (NEURONTIN) 100 MG capsule Take 100 mg by mouth in the morning and 100 mg in the evening.    Yes Historical Provider, MD   acyclovir (ZOVIRAX) 800 MG tablet Take 800 mg by mouth 5 times daily As directed as needed   Yes Historical Provider, MD   levothyroxine (SYNTHROID) 50 MCG tablet take 1 tablet by mouth once daily 6/8/22  Yes MARY Carrillo CNP   FLUoxetine (PROZAC) 40 MG capsule Take 1 capsule by mouth daily 6/8/22  Yes MARY Carrillo CNP   busPIRone (BUSPAR) 15 MG tablet take 1 tablet by mouth twice daily as needed for anxiety 6/8/22  Yes MARY Carrillo CNP   albuterol sulfate HFA (PROVENTIL;VENTOLIN;PROAIR) 108 (90 Base) MCG/ACT inhaler Inhale 2 puffs into the lungs every 4 hours as needed for Wheezing or Shortness of Breath 6/8/22  Yes MARY Carrillo CNP   ASPIRIN LOW DOSE 81 MG EC tablet take 1 tablet by mouth once daily 1/26/22  Yes Deborah Church DO   pregabalin (LYRICA) 75 MG capsule Take 1 capsule by mouth 2 times daily for 30 days. Patient not taking: Reported on 8/5/2022 7/13/22 8/12/22  MARY Gorman CNP       No Known Allergies    Social History     Socioeconomic History    Marital status: Single     Spouse name: Not on file    Number of children: Not on file    Years of education: Not on file    Highest education level: Not on file   Occupational History    Not on file   Tobacco Use    Smoking status: Every Day     Packs/day: 0.50     Years: 22.00     Pack years: 11.00     Types: Cigarettes    Smokeless tobacco: Never    Tobacco comments:     know to cut down   Vaping Use    Vaping Use: Never used   Substance and Sexual Activity    Alcohol use: Not Currently     Comment: occ    Drug use: Yes     Types: Marijuana Gayle Kos)    Sexual activity: Yes     Partners: Male   Other Topics Concern    Not on file   Social History Narrative    Not on file     Social Determinants of Health     Financial Resource Strain: Medium Risk    Difficulty of Paying Living Expenses: Somewhat hard   Food Insecurity: No Food Insecurity    Worried About Running Out of Food in the Last Year: Never true    Ran Out of Food in the Last Year: Never true   Transportation Needs: Not on file   Physical Activity: Not on file   Stress: Not on file   Social Connections: Not on file   Intimate Partner Violence: Not on file   Housing Stability: Not on file       Family History   Problem Relation Age of Onset    Depression Mother     Hypertension Mother     Stroke Mother     Elevated Lipids Mother     Anxiety Disorder Mother     Heart Disease Father     Heart Disease Maternal Grandmother     Diabetes Maternal Grandmother     Kidney Disease Maternal Grandmother        REVIEW OF SYSTEMS:     Patient specifically denies fever/chills, chest pain, shortness of breath, new bowel or bladder complaints. All other review of systems was negative.     Review of Systems - Documented reviewed    PHYSICAL EXAMINATION:      /80   Pulse 81   Temp 97.3 °F (36.3 °C) (Infrared)   Ht 6' 1\" (1.854 m)   Wt (!) 325 lb (147.4 kg)   SpO2 98%   BMI 42.88 kg/m²     General:      General appearance:  Pleasant and well-hydrated, in no distress and A & O x 3  Build:Obese  Function: Rises from seated position easily    HEENT:    Head:normocephalic, atraumatic    Lungs:    Breathing:normal breathing pattern     CVS:     RRR    Abdomen:    Shape:non-distended and normal    Cervical spine:    Inspection:normal  Palpation:tenderness paravertebral muscles, tenderness trapezium, left, right : no  Range of motion:No pain    Thoracic spine:     Spine inspection:normal   Palpation:No tenderness over the midline and paraspinal area, bilaterally  Range of motion:normal in flexion, extension rotation bilateral and is not painful. Lumbar spine:    Spine inspection: scar form prior surgery - healed well  Palpation: Tenderness paravertebral muscles Yes bilaterally  Range of motion: Decreased, flexion Decreased, Lateral bending, extension and rotation bilaterally reduced is painful. Lumbar facet loading + bilaterally  Sacroiliac joint tenderness No bilaterally  TIA test: negative bilaterally  Gaenslen's test:negative bilaterally   Piriformis tenderness: negative bilaterally  SLR : negative bilaterally    Musculoskeletal:    Trigger points No     Extremities:    Tremors:None bilaterally upper and lower  Edema:+ B/l LE    Neurological:    Sensory: Normal to light touch     Motor:   Right  5/5              Left  5/5               Right Bicep 5/5           Left Bicep 5/5              Right Triceps 5/5       Left Triceps 5/5          Right Deltoid 5/5     Left Deltoid 5/5                  Right Quadriceps 5/5          Left Quadriceps 5/5           Right Gastrocnemius 5/5    Left Gastrocnemius 5/5  Right Ant Tibialis 5/5  Left Ant Tibialis 5/5    Dermatology:    Skin:no rashes or lesions noted    Assessment/Plan:     Diagnosis Orders   1. S/P lumbar discectomy        2. Lumbosacral spondylosis without myelopathy        3. DDD (degenerative disc disease), lumbar        4. Obesity, unspecified classification, unspecified obesity type, unspecified whether serious comorbidity present        5. History of marijuana use            38 y.o. female with H/o prior lumbar discectomy in 2004 by Dr. Luma Dawson. Ongoing pain low back- predominantly axial in nature. Features of facet pain. Prior treatment with Dr. Jayme White- LAYA'suresh due to failed to appear to pill count. H/o Smoking  H/o THC use  Obesity +    PLAN:    Physical therapy    X-ray LS spine    Compound cream.    Consider Lumbar MBNB after PT if pain persists. Weight loss    Smoking cessation    H/o THC use +  No opioid treatment options dicussed. She was interested in starting opioids. Recommend second opinion. F/U prn. Counseling : Patient encouraged to stay active and to watch/lose weight    Encouraged to continue Regular home exercise program as tolerated - stretching / strengthening. Smoking cessation counseling : yes     Treatment plan discussed with the patient including medication and procedure side effects. Controlled Substances Monitoring: [place] reviewed.     Macario Garcia MD    CC:    Dario Hernandez, APRN - CNP  [facility] [id number]

## 2022-08-09 DIAGNOSIS — R79.89 ELEVATED SERUM CREATININE: Primary | ICD-10-CM

## 2022-08-09 PROBLEM — O12.10 PROTEINURIA AFFECTING PREGNANCY: Status: RESOLVED | Noted: 2020-04-10 | Resolved: 2022-08-09

## 2022-08-09 PROBLEM — Z3A.35 35 WEEKS GESTATION OF PREGNANCY: Status: RESOLVED | Noted: 2020-07-16 | Resolved: 2022-08-09

## 2022-08-09 PROBLEM — Z3A.34 34 WEEKS GESTATION OF PREGNANCY: Status: RESOLVED | Noted: 2020-07-07 | Resolved: 2022-08-09

## 2022-08-09 PROBLEM — Z3A.36 36 WEEKS GESTATION OF PREGNANCY: Status: RESOLVED | Noted: 2020-07-20 | Resolved: 2022-08-09

## 2022-08-09 PROBLEM — O24.415 GESTATIONAL DIABETES MELLITUS (GDM) IN THIRD TRIMESTER CONTROLLED ON ORAL HYPOGLYCEMIC DRUG: Status: RESOLVED | Noted: 2020-06-10 | Resolved: 2022-08-09

## 2022-08-09 PROBLEM — O09.529 AMA (ADVANCED MATERNAL AGE) MULTIGRAVIDA 35+: Status: RESOLVED | Noted: 2020-04-10 | Resolved: 2022-08-09

## 2022-08-09 PROBLEM — O99.210 OBESITY AFFECTING PREGNANCY: Status: RESOLVED | Noted: 2020-04-10 | Resolved: 2022-08-09

## 2022-08-10 ENCOUNTER — TELEPHONE (OUTPATIENT)
Dept: PHYSICAL THERAPY | Age: 42
End: 2022-08-10

## 2022-08-10 PROBLEM — Z98.890 S/P LUMBAR DISCECTOMY: Status: ACTIVE | Noted: 2022-08-10

## 2022-08-10 PROBLEM — M51.36 DDD (DEGENERATIVE DISC DISEASE), LUMBAR: Status: ACTIVE | Noted: 2022-08-10

## 2022-08-10 PROBLEM — M51.369 DDD (DEGENERATIVE DISC DISEASE), LUMBAR: Status: ACTIVE | Noted: 2022-08-10

## 2022-08-10 PROBLEM — M47.817 LUMBOSACRAL SPONDYLOSIS WITHOUT MYELOPATHY: Status: ACTIVE | Noted: 2022-08-10

## 2022-08-10 NOTE — TELEPHONE ENCOUNTER
Date: 8/10/2022       Patient Name: Pearlene Severance  : 1980      MRN: [id number]    No show/no call for PT Evaluation scheduled at 1540 today.     Jostin Leon, PT

## 2022-08-12 DIAGNOSIS — F41.9 ANXIETY: ICD-10-CM

## 2022-08-12 RX ORDER — HYDROXYZINE HYDROCHLORIDE 25 MG/1
25 TABLET, FILM COATED ORAL EVERY 8 HOURS PRN
Qty: 30 TABLET | Refills: 0 | Status: SHIPPED | OUTPATIENT
Start: 2022-08-12 | End: 2022-08-22

## 2022-08-23 DIAGNOSIS — M54.42 CHRONIC BILATERAL LOW BACK PAIN WITH BILATERAL SCIATICA: ICD-10-CM

## 2022-08-23 DIAGNOSIS — G89.29 CHRONIC BILATERAL LOW BACK PAIN WITH BILATERAL SCIATICA: ICD-10-CM

## 2022-08-23 DIAGNOSIS — M54.41 CHRONIC BILATERAL LOW BACK PAIN WITH BILATERAL SCIATICA: ICD-10-CM

## 2022-08-23 RX ORDER — PREGABALIN 75 MG/1
75 CAPSULE ORAL 2 TIMES DAILY
Qty: 60 CAPSULE | Refills: 0 | Status: SHIPPED
Start: 2022-08-23 | End: 2022-08-31

## 2022-08-31 ENCOUNTER — OFFICE VISIT (OUTPATIENT)
Dept: FAMILY MEDICINE CLINIC | Age: 42
End: 2022-08-31
Payer: COMMERCIAL

## 2022-08-31 VITALS
BODY MASS INDEX: 39.68 KG/M2 | RESPIRATION RATE: 16 BRPM | TEMPERATURE: 97.2 F | SYSTOLIC BLOOD PRESSURE: 148 MMHG | HEART RATE: 88 BPM | HEIGHT: 72 IN | OXYGEN SATURATION: 98 % | DIASTOLIC BLOOD PRESSURE: 90 MMHG | WEIGHT: 293 LBS

## 2022-08-31 DIAGNOSIS — M54.41 CHRONIC BILATERAL LOW BACK PAIN WITH BILATERAL SCIATICA: Primary | ICD-10-CM

## 2022-08-31 DIAGNOSIS — G89.29 CHRONIC BILATERAL LOW BACK PAIN WITH BILATERAL SCIATICA: Primary | ICD-10-CM

## 2022-08-31 DIAGNOSIS — M54.42 CHRONIC BILATERAL LOW BACK PAIN WITH BILATERAL SCIATICA: Primary | ICD-10-CM

## 2022-08-31 DIAGNOSIS — Z95.828 S/P INSERTION OF ILIAC ARTERY STENT: ICD-10-CM

## 2022-08-31 DIAGNOSIS — F41.9 ANXIETY: ICD-10-CM

## 2022-08-31 PROCEDURE — G8417 CALC BMI ABV UP PARAM F/U: HCPCS | Performed by: NURSE PRACTITIONER

## 2022-08-31 PROCEDURE — G8427 DOCREV CUR MEDS BY ELIG CLIN: HCPCS | Performed by: NURSE PRACTITIONER

## 2022-08-31 PROCEDURE — 4004F PT TOBACCO SCREEN RCVD TLK: CPT | Performed by: NURSE PRACTITIONER

## 2022-08-31 PROCEDURE — 99213 OFFICE O/P EST LOW 20 MIN: CPT | Performed by: NURSE PRACTITIONER

## 2022-08-31 RX ORDER — PREGABALIN 100 MG/1
100 CAPSULE ORAL 2 TIMES DAILY
Qty: 60 CAPSULE | Refills: 0 | Status: SHIPPED
Start: 2022-08-31 | End: 2022-10-05

## 2022-08-31 RX ORDER — HYDROXYZINE 50 MG/1
50 TABLET, FILM COATED ORAL 2 TIMES DAILY PRN
Qty: 60 TABLET | Refills: 0 | Status: SHIPPED | OUTPATIENT
Start: 2022-08-31 | End: 2022-09-30

## 2022-08-31 RX ORDER — ASPIRIN 81 MG/1
TABLET ORAL
Qty: 30 TABLET | Refills: 3 | Status: SHIPPED | OUTPATIENT
Start: 2022-08-31

## 2022-08-31 ASSESSMENT — PATIENT HEALTH QUESTIONNAIRE - PHQ9
4. FEELING TIRED OR HAVING LITTLE ENERGY: 0
SUM OF ALL RESPONSES TO PHQ QUESTIONS 1-9: 0
1. LITTLE INTEREST OR PLEASURE IN DOING THINGS: 0
10. IF YOU CHECKED OFF ANY PROBLEMS, HOW DIFFICULT HAVE THESE PROBLEMS MADE IT FOR YOU TO DO YOUR WORK, TAKE CARE OF THINGS AT HOME, OR GET ALONG WITH OTHER PEOPLE: 0
6. FEELING BAD ABOUT YOURSELF - OR THAT YOU ARE A FAILURE OR HAVE LET YOURSELF OR YOUR FAMILY DOWN: 0
SUM OF ALL RESPONSES TO PHQ QUESTIONS 1-9: 4
2. FEELING DOWN, DEPRESSED OR HOPELESS: 0
SUM OF ALL RESPONSES TO PHQ QUESTIONS 1-9: 0
SUM OF ALL RESPONSES TO PHQ QUESTIONS 1-9: 4
2. FEELING DOWN, DEPRESSED OR HOPELESS: 1
7. TROUBLE CONCENTRATING ON THINGS, SUCH AS READING THE NEWSPAPER OR WATCHING TELEVISION: 0
3. TROUBLE FALLING OR STAYING ASLEEP: 3
SUM OF ALL RESPONSES TO PHQ QUESTIONS 1-9: 4
9. THOUGHTS THAT YOU WOULD BE BETTER OFF DEAD, OR OF HURTING YOURSELF: 0
SUM OF ALL RESPONSES TO PHQ9 QUESTIONS 1 & 2: 0
5. POOR APPETITE OR OVEREATING: 0
SUM OF ALL RESPONSES TO PHQ QUESTIONS 1-9: 0
SUM OF ALL RESPONSES TO PHQ QUESTIONS 1-9: 4
SUM OF ALL RESPONSES TO PHQ QUESTIONS 1-9: 0
SUM OF ALL RESPONSES TO PHQ9 QUESTIONS 1 & 2: 1
8. MOVING OR SPEAKING SO SLOWLY THAT OTHER PEOPLE COULD HAVE NOTICED. OR THE OPPOSITE, BEING SO FIGETY OR RESTLESS THAT YOU HAVE BEEN MOVING AROUND A LOT MORE THAN USUAL: 0
1. LITTLE INTEREST OR PLEASURE IN DOING THINGS: 0

## 2022-08-31 ASSESSMENT — ENCOUNTER SYMPTOMS
VOMITING: 0
NAUSEA: 0
SHORTNESS OF BREATH: 0
WHEEZING: 0
BACK PAIN: 1
DIARRHEA: 0
CONSTIPATION: 0
COUGH: 0

## 2022-08-31 NOTE — PROGRESS NOTES
Jes Malik (:  1980) is a 38 y.o. female,Established patient, here for evaluation of the following chief complaint(s):  Back Pain (Medications working well except for pain management which is not effective at this time)         ASSESSMENT/PLAN:  1. Chronic bilateral low back pain with bilateral sciatica  -     pregabalin (LYRICA) 100 MG capsule; Take 1 capsule by mouth 2 times daily for 30 days. , Disp-60 capsule, R-0Normal  Dose adjusted, follow up with pain management  Continue weight loss and exercise    2. S/P insertion of iliac artery stent  -     aspirin (ASPIRIN LOW DOSE) 81 MG EC tablet; take 1 tablet by mouth once daily, Disp-30 tablet, R-3Normal  The current medical regimen is effective;  continue present plan and medications. 3. Anxiety  -     hydrOXYzine HCl (ATARAX) 50 MG tablet; Take 1 tablet by mouth 2 times daily as needed for Itching, Disp-60 tablet, R-0Normal  Dose adjusted due to ineffectiveness    Controlled Substance Monitoring:    Acute and Chronic Pain Monitoring:   RX Monitoring 2022   Periodic Controlled Substance Monitoring No signs of potential drug abuse or diversion identified. Return in about 3 months (around 2022), or if symptoms worsen or fail to improve. Subjective   SUBJECTIVE/OBJECTIVE:  Complains of anxiety. States atarax is more effective than buspar but does not feel strong enough. Complains of pain to low back with radiation down right leg. Seeing pain management but states no able to complete PT due to pain      Review of Systems   Constitutional:  Negative for activity change, appetite change, fatigue and unexpected weight change. Respiratory:  Negative for cough, shortness of breath and wheezing. Cardiovascular:  Positive for palpitations (occasional with SVT). Negative for chest pain. Gastrointestinal:  Negative for constipation, diarrhea, nausea and vomiting. Musculoskeletal:  Positive for back pain and myalgias. Neurological:  Positive for numbness (upper legs). Negative for weakness, light-headedness and headaches. Psychiatric/Behavioral:  Positive for dysphoric mood and sleep disturbance. Negative for suicidal ideas. The patient is nervous/anxious. Objective   BP (!) 148/90   Pulse 88   Temp 97.2 °F (36.2 °C) (Temporal)   Resp 16   Ht 6' 1\" (1.854 m)   Wt (!) 332 lb (150.6 kg)   SpO2 98%   BMI 43.80 kg/m²    Physical Exam  Constitutional:       General: She is not in acute distress. Appearance: Normal appearance. She is well-developed. She is obese. HENT:      Head: Normocephalic and atraumatic. Neck:      Thyroid: No thyromegaly. Trachea: No tracheal deviation. Cardiovascular:      Rate and Rhythm: Normal rate and regular rhythm. Heart sounds: Normal heart sounds. No murmur heard. Pulmonary:      Effort: Pulmonary effort is normal. No respiratory distress. Breath sounds: Normal breath sounds. No wheezing, rhonchi or rales. Chest:      Chest wall: No tenderness. Abdominal:      General: Bowel sounds are normal.      Palpations: Abdomen is soft. Tenderness: There is no abdominal tenderness. Musculoskeletal:         General: Tenderness (lumbar spine with bilateral SI pain) present. Right lower leg: Edema present. Left lower leg: Edema present. Lymphadenopathy:      Cervical: No cervical adenopathy. Skin:     General: Skin is warm and dry. Neurological:      Mental Status: She is alert and oriented to person, place, and time. Gait: Gait normal.   Psychiatric:         Mood and Affect: Mood normal.         Behavior: Behavior normal.          [facility] low      An electronic signature was used to authenticate this note.     --Kenneth Spore, APRN - CNP

## 2022-10-01 NOTE — TELEPHONE ENCOUNTER
Called Pharmacy back, talked with DR Mariola Pastrana, please refill lovenox x1. Spoke with Mari Marcos verbal to ROSALBA WING [facility], pharmacist.    DR Mariola Pastrana aware and will sign.
Yes

## 2022-10-05 DIAGNOSIS — G89.29 CHRONIC BILATERAL LOW BACK PAIN WITH BILATERAL SCIATICA: ICD-10-CM

## 2022-10-05 DIAGNOSIS — M54.41 CHRONIC BILATERAL LOW BACK PAIN WITH BILATERAL SCIATICA: ICD-10-CM

## 2022-10-05 DIAGNOSIS — M54.42 CHRONIC BILATERAL LOW BACK PAIN WITH BILATERAL SCIATICA: ICD-10-CM

## 2022-10-05 RX ORDER — PREGABALIN 100 MG/1
CAPSULE ORAL
Qty: 60 CAPSULE | Refills: 0 | Status: SHIPPED
Start: 2022-10-05 | End: 2022-11-04

## 2022-10-12 DIAGNOSIS — E03.8 HYPOTHYROIDISM DUE TO HASHIMOTO'S THYROIDITIS: ICD-10-CM

## 2022-10-12 DIAGNOSIS — F32.A DEPRESSION, UNSPECIFIED DEPRESSION TYPE: ICD-10-CM

## 2022-10-12 DIAGNOSIS — E06.3 HYPOTHYROIDISM DUE TO HASHIMOTO'S THYROIDITIS: ICD-10-CM

## 2022-10-12 RX ORDER — LEVOTHYROXINE SODIUM 0.05 MG/1
TABLET ORAL
Qty: 30 TABLET | Refills: 3 | Status: SHIPPED | OUTPATIENT
Start: 2022-10-12

## 2022-10-12 RX ORDER — FLUOXETINE HYDROCHLORIDE 40 MG/1
CAPSULE ORAL
Qty: 30 CAPSULE | Refills: 3 | Status: SHIPPED | OUTPATIENT
Start: 2022-10-12

## 2022-10-12 NOTE — TELEPHONE ENCOUNTER
Requested Prescriptions     Pending Prescriptions Disp Refills    levothyroxine (SYNTHROID) 50 MCG tablet [Pharmacy Med Name: LEVOTHYROXINE 50 MCG TABLET] 30 tablet 3     Sig: take 1 tablet by mouth once daily    FLUoxetine (PROZAC) 40 MG capsule [Pharmacy Med Name: FLUOXETINE HCL 40 MG CAPSULE] 30 capsule 3     Sig: take 1 capsule by mouth once daily       Next appt is Visit date not found  Last appt was 8/31/2022

## 2022-11-01 ENCOUNTER — HOSPITAL ENCOUNTER (EMERGENCY)
Age: 42
Discharge: HOME | End: 2022-11-01
Attending: EMERGENCY MEDICINE
Payer: COMMERCIAL

## 2022-11-01 VITALS
BODY MASS INDEX: 39.68 KG/M2 | WEIGHT: 293 LBS | OXYGEN SATURATION: 100 % | HEIGHT: 72 IN | RESPIRATION RATE: 18 BRPM | HEART RATE: 68 BPM | DIASTOLIC BLOOD PRESSURE: 108 MMHG | SYSTOLIC BLOOD PRESSURE: 155 MMHG | TEMPERATURE: 98.8 F

## 2022-11-01 DIAGNOSIS — R31.9 URINARY TRACT INFECTION WITH HEMATURIA, SITE UNSPECIFIED: Primary | ICD-10-CM

## 2022-11-01 DIAGNOSIS — N39.0 URINARY TRACT INFECTION WITH HEMATURIA, SITE UNSPECIFIED: Primary | ICD-10-CM

## 2022-11-01 LAB
BACTERIA: ABNORMAL /HPF
BILIRUBIN URINE: NEGATIVE
BLOOD, URINE: ABNORMAL
CLARITY: CLEAR
COLOR: YELLOW
EPITHELIAL CELLS, UA: ABNORMAL /HPF
GLUCOSE URINE: NEGATIVE MG/DL
KETONES, URINE: NEGATIVE MG/DL
LEUKOCYTE ESTERASE, URINE: ABNORMAL
NITRITE, URINE: NEGATIVE
PH UA: 7 (ref 5–9)
PROTEIN UA: 30 MG/DL
RBC UA: ABNORMAL /HPF (ref 0–2)
SPECIFIC GRAVITY UA: 1.02 (ref 1–1.03)
UROBILINOGEN, URINE: 0.2 E.U./DL
WBC UA: ABNORMAL /HPF (ref 0–5)

## 2022-11-01 PROCEDURE — 99283 EMERGENCY DEPT VISIT LOW MDM: CPT

## 2022-11-01 PROCEDURE — 81001 URINALYSIS AUTO W/SCOPE: CPT

## 2022-11-01 RX ORDER — PHENAZOPYRIDINE HYDROCHLORIDE 100 MG/1
100 TABLET, FILM COATED ORAL 3 TIMES DAILY PRN
Qty: 6 TABLET | Refills: 0 | Status: SHIPPED | OUTPATIENT
Start: 2022-11-01 | End: 2022-11-04

## 2022-11-01 RX ORDER — NITROFURANTOIN 25; 75 MG/1; MG/1
100 CAPSULE ORAL 2 TIMES DAILY
Qty: 20 CAPSULE | Refills: 0 | Status: SHIPPED | OUTPATIENT
Start: 2022-11-01 | End: 2022-11-11

## 2022-11-01 RX ORDER — HYDROXYZINE 50 MG/1
100 TABLET, FILM COATED ORAL DAILY
COMMUNITY

## 2022-11-01 ASSESSMENT — ENCOUNTER SYMPTOMS
ABDOMINAL DISTENTION: 0
DIARRHEA: 0
EYE REDNESS: 0
SHORTNESS OF BREATH: 0
VOMITING: 0
WHEEZING: 0
SINUS PRESSURE: 0
ABDOMINAL PAIN: 0
NAUSEA: 0
EYE DISCHARGE: 0
BACK PAIN: 0
EYE PAIN: 0
SORE THROAT: 0
COUGH: 0
BLOOD IN STOOL: 0

## 2022-11-01 ASSESSMENT — PAIN SCALES - GENERAL: PAINLEVEL_OUTOF10: 8

## 2022-11-01 ASSESSMENT — PAIN DESCRIPTION - DESCRIPTORS: DESCRIPTORS: PRESSURE

## 2022-11-01 ASSESSMENT — PAIN DESCRIPTION - ONSET: ONSET: ON-GOING

## 2022-11-01 ASSESSMENT — PAIN - FUNCTIONAL ASSESSMENT: PAIN_FUNCTIONAL_ASSESSMENT: 0-10

## 2022-11-01 ASSESSMENT — PAIN DESCRIPTION - LOCATION: LOCATION: ABDOMEN

## 2022-11-01 ASSESSMENT — PAIN DESCRIPTION - ORIENTATION: ORIENTATION: LOWER;MID

## 2022-11-01 ASSESSMENT — PAIN DESCRIPTION - FREQUENCY: FREQUENCY: CONTINUOUS

## 2022-11-01 NOTE — ED PROVIDER NOTES
The history is provided by the patient. Dysuria   This is a new problem. The current episode started yesterday. The problem occurs every urination. The quality of the pain is described as burning. The pain is mild. There has been no fever. Associated symptoms include frequency, hematuria and urgency. Pertinent negatives include no chills, no nausea, no vomiting and no hesitancy. Review of Systems   Constitutional:  Negative for chills and fever. HENT:  Negative for ear pain, sinus pressure and sore throat. Eyes:  Negative for pain, discharge and redness. Respiratory:  Negative for cough, shortness of breath and wheezing. Cardiovascular:  Negative for chest pain. Gastrointestinal:  Negative for abdominal distention, abdominal pain, blood in stool, diarrhea, nausea and vomiting. Genitourinary:  Positive for dysuria, frequency, hematuria and urgency. Negative for hesitancy. Musculoskeletal:  Negative for arthralgias and back pain. Skin:  Negative for rash and wound. Neurological:  Negative for weakness and headaches. Hematological:  Negative for adenopathy. All other systems reviewed and are negative. Physical Exam  Vitals and nursing note reviewed. Constitutional:       Appearance: She is well-developed. HENT:      Head: Normocephalic and atraumatic. Right Ear: Hearing and external ear normal.      Left Ear: Hearing and external ear normal.      Nose: Nose normal.      Mouth/Throat:      Pharynx: Uvula midline. Eyes:      General: Lids are normal.      Conjunctiva/sclera: Conjunctivae normal.      Pupils: Pupils are equal, round, and reactive to light. Cardiovascular:      Rate and Rhythm: Normal rate and regular rhythm. Heart sounds: Normal heart sounds. No murmur heard. Pulmonary:      Effort: Pulmonary effort is normal. No respiratory distress. Breath sounds: Normal breath sounds. No wheezing or rales.    Abdominal:      General: Bowel sounds are normal. Palpations: Abdomen is soft. Abdomen is not rigid. Tenderness: There is no abdominal tenderness. There is no guarding or rebound. Musculoskeletal:      Cervical back: Normal range of motion and neck supple. Skin:     General: Skin is warm and dry. Findings: No abrasion or rash. Neurological:      Mental Status: She is alert and oriented to person, place, and time. GCS: GCS eye subscore is 4. GCS verbal subscore is 5. GCS motor subscore is 6. Cranial Nerves: No cranial nerve deficit. Sensory: No sensory deficit. Coordination: Coordination normal.      Gait: Gait normal.        Procedures     MDM          --------------------------------------------- PAST HISTORY ---------------------------------------------  Past Medical History:  has a past medical history of Abnormal Pap smear of cervix, CAD (coronary artery disease), Chronic back pain, Degenerative disc disease at L5-S1 level, Depression, Edema, Gestational diabetes mellitus (GDM) in third trimester controlled on oral hypoglycemic drug, Hypertension, Rh incompatibility, Rh sensitized, Thyroid disease, and Thyroid disease. Past Surgical History:  has a past surgical history that includes back surgery; Vein Surgery (Bilateral); and LEEP. Social History:  reports that she has been smoking cigarettes. She has a 11.00 pack-year smoking history. She has never used smokeless tobacco. She reports that she does not currently use alcohol. She reports current drug use. Drug: Marijuana Colleen Pleas). Family History: family history includes Anxiety Disorder in her mother; Depression in her mother; Diabetes in her maternal grandmother; Elevated Lipids in her mother; Heart Disease in her father and maternal grandmother; Hypertension in her mother; Kidney Disease in her maternal grandmother; Stroke in her mother. The patients home medications have been reviewed.     Allergies: Patient has no known allergies. -------------------------------------------------- RESULTS -------------------------------------------------  Labs:  Results for orders placed or performed during the hospital encounter of 11/01/22   Urinalysis   Result Value Ref Range    Color, UA Yellow Straw/Yellow    Clarity, UA Clear Clear    Glucose, Ur Negative Negative mg/dL    Bilirubin Urine Negative Negative    Ketones, Urine Negative Negative mg/dL    Specific Gravity, UA 1.020 1.005 - 1.030    Blood, Urine LARGE (A) Negative    pH, UA 7.0 5.0 - 9.0    Protein, UA 30 (A) Negative mg/dL    Urobilinogen, Urine 0.2 <2.0 E.U./dL    Nitrite, Urine Negative Negative    Leukocyte Esterase, Urine TRACE (A) Negative   Microscopic Urinalysis   Result Value Ref Range    WBC, UA 1-3 0 - 5 /HPF    RBC, UA 5-10 (A) 0 - 2 /HPF    Epithelial Cells, UA FEW /HPF    Bacteria, UA RARE (A) None Seen /HPF       Radiology:  No orders to display       ------------------------- NURSING NOTES AND VITALS REVIEWED ---------------------------  Date / Time Roomed:  11/1/2022  5:56 PM  ED Bed Assignment:  03/03    The nursing notes within the ED encounter and vital signs as below have been reviewed. BP (!) 155/108   Pulse 68   Temp 98.8 °F (37.1 °C) (Oral)   Resp 18   Ht 6' (1.829 m)   Wt (!) 320 lb (145.2 kg)   LMP 10/31/2022   SpO2 100%   BMI 43.40 kg/m²   Oxygen Saturation Interpretation: Normal      ------------------------------------------ PROGRESS NOTES ------------------------------------------  I have spoken with the patient and discussed todays results, in addition to providing specific details for the plan of care and counseling regarding the diagnosis and prognosis. Their questions are answered at this time and they are agreeable with the plan. I discussed at length with them reasons for immediate return here for re evaluation. They will followup with primary care by calling their office tomorrow.     Medications - No data to display      --------------------------------- ADDITIONAL PROVIDER NOTES ---------------------------------  At this time the patient is without objective evidence of an acute process requiring hospitalization or inpatient management. They have remained hemodynamically stable throughout their entire ED visit and are stable for discharge with outpatient follow-up. The plan has been discussed in detail and they are aware of the specific conditions for emergent return, as well as the importance of follow-up. New Prescriptions    NITROFURANTOIN, MACROCRYSTAL-MONOHYDRATE, (MACROBID) 100 MG CAPSULE    Take 1 capsule by mouth 2 times daily for 10 days    PHENAZOPYRIDINE (PYRIDIUM) 100 MG TABLET    Take 1 tablet by mouth 3 times daily as needed for Pain       Diagnosis:  1. Urinary tract infection with hematuria, site unspecified        Disposition:  Patient's disposition: Discharge to home  Patient's condition is stable.                     Jas Kraft MD  11/01/22 [id number]

## 2022-11-04 DIAGNOSIS — M54.42 CHRONIC BILATERAL LOW BACK PAIN WITH BILATERAL SCIATICA: ICD-10-CM

## 2022-11-04 DIAGNOSIS — M54.41 CHRONIC BILATERAL LOW BACK PAIN WITH BILATERAL SCIATICA: ICD-10-CM

## 2022-11-04 DIAGNOSIS — G89.29 CHRONIC BILATERAL LOW BACK PAIN WITH BILATERAL SCIATICA: ICD-10-CM

## 2022-11-04 RX ORDER — PREGABALIN 100 MG/1
CAPSULE ORAL
Qty: 60 CAPSULE | Refills: 0 | Status: SHIPPED
Start: 2022-11-04 | End: 2022-12-01 | Stop reason: SDUPTHER

## 2022-11-21 ENCOUNTER — TELEPHONE (OUTPATIENT)
Dept: FAMILY MEDICINE CLINIC | Age: 42
End: 2022-11-21

## 2022-11-21 DIAGNOSIS — F41.9 ANXIETY: Primary | ICD-10-CM

## 2022-11-21 RX ORDER — BUSPIRONE HYDROCHLORIDE 15 MG/1
15 TABLET ORAL 2 TIMES DAILY
Qty: 60 TABLET | Refills: 0 | Status: SHIPPED | OUTPATIENT
Start: 2022-11-21

## 2022-11-29 DIAGNOSIS — G89.29 CHRONIC BILATERAL LOW BACK PAIN WITH BILATERAL SCIATICA: ICD-10-CM

## 2022-11-29 DIAGNOSIS — M54.41 CHRONIC BILATERAL LOW BACK PAIN WITH BILATERAL SCIATICA: ICD-10-CM

## 2022-11-29 DIAGNOSIS — M54.42 CHRONIC BILATERAL LOW BACK PAIN WITH BILATERAL SCIATICA: ICD-10-CM

## 2022-11-29 RX ORDER — PREGABALIN 100 MG/1
CAPSULE ORAL
Qty: 60 CAPSULE | Refills: 0 | OUTPATIENT
Start: 2022-11-29 | End: 2022-12-29

## 2022-12-01 DIAGNOSIS — M54.41 CHRONIC BILATERAL LOW BACK PAIN WITH BILATERAL SCIATICA: ICD-10-CM

## 2022-12-01 DIAGNOSIS — M54.42 CHRONIC BILATERAL LOW BACK PAIN WITH BILATERAL SCIATICA: ICD-10-CM

## 2022-12-01 DIAGNOSIS — G89.29 CHRONIC BILATERAL LOW BACK PAIN WITH BILATERAL SCIATICA: ICD-10-CM

## 2022-12-01 RX ORDER — PREGABALIN 100 MG/1
CAPSULE ORAL
Qty: 14 CAPSULE | Refills: 0 | Status: SHIPPED | OUTPATIENT
Start: 2022-12-01 | End: 2023-01-01

## 2022-12-01 NOTE — TELEPHONE ENCOUNTER
Requested Prescriptions     Pending Prescriptions Disp Refills    pregabalin (LYRICA) 100 MG capsule 14 capsule 0     Sig: take 1 capsule by mouth twice a day       Next appt is 12/6/2022  Last appt was 8/31/2022

## 2023-01-13 ENCOUNTER — HOSPITAL ENCOUNTER (EMERGENCY)
Age: 43
Discharge: HOME | End: 2023-01-13
Attending: FAMILY MEDICINE
Payer: COMMERCIAL

## 2023-01-13 VITALS
BODY MASS INDEX: 39.68 KG/M2 | RESPIRATION RATE: 20 BRPM | HEIGHT: 72 IN | DIASTOLIC BLOOD PRESSURE: 104 MMHG | HEART RATE: 116 BPM | SYSTOLIC BLOOD PRESSURE: 148 MMHG | OXYGEN SATURATION: 100 % | WEIGHT: 293 LBS | TEMPERATURE: 97 F

## 2023-01-13 DIAGNOSIS — G89.29 ACUTE EXACERBATION OF CHRONIC LOW BACK PAIN: Primary | ICD-10-CM

## 2023-01-13 DIAGNOSIS — M54.50 ACUTE EXACERBATION OF CHRONIC LOW BACK PAIN: Primary | ICD-10-CM

## 2023-01-13 DIAGNOSIS — M54.31 SCIATICA OF RIGHT SIDE: ICD-10-CM

## 2023-01-13 PROCEDURE — 96372 THER/PROPH/DIAG INJ SC/IM: CPT

## 2023-01-13 PROCEDURE — 99284 EMERGENCY DEPT VISIT MOD MDM: CPT

## 2023-01-13 PROCEDURE — 6360000002 HC RX W HCPCS: Performed by: FAMILY MEDICINE

## 2023-01-13 PROCEDURE — 6370000000 HC RX 637 (ALT 250 FOR IP): Performed by: FAMILY MEDICINE

## 2023-01-13 RX ORDER — KETOROLAC TROMETHAMINE 30 MG/ML
60 INJECTION, SOLUTION INTRAMUSCULAR; INTRAVENOUS ONCE
Status: COMPLETED | OUTPATIENT
Start: 2023-01-13 | End: 2023-01-13

## 2023-01-13 RX ORDER — CYCLOBENZAPRINE HCL 10 MG
10 TABLET ORAL 3 TIMES DAILY PRN
Qty: 21 TABLET | Refills: 0 | Status: SHIPPED | OUTPATIENT
Start: 2023-01-13 | End: 2023-01-23

## 2023-01-13 RX ORDER — PREDNISONE 20 MG/1
40 TABLET ORAL DAILY
Qty: 8 TABLET | Refills: 0 | Status: SHIPPED | OUTPATIENT
Start: 2023-01-13 | End: 2023-01-17

## 2023-01-13 RX ORDER — PREDNISONE 20 MG/1
60 TABLET ORAL ONCE
Status: COMPLETED | OUTPATIENT
Start: 2023-01-13 | End: 2023-01-13

## 2023-01-13 RX ORDER — IBUPROFEN 200 MG
400 TABLET ORAL EVERY 6 HOURS PRN
COMMUNITY

## 2023-01-13 RX ADMIN — KETOROLAC TROMETHAMINE 60 MG: 30 INJECTION, SOLUTION INTRAMUSCULAR at 20:03

## 2023-01-13 RX ADMIN — PREDNISONE 60 MG: 20 TABLET ORAL at 20:04

## 2023-01-13 NOTE — Clinical Note
Stephy Justin was seen and treated in our emergency department on 1/13/2023. She may return to work on . If you have any questions or concerns, please don't hesitate to call.       La Fischer MD

## 2023-01-13 NOTE — Clinical Note
Marc Casanova was seen and treated in our emergency department on 1/13/2023. She may return to work on . If you have any questions or concerns, please don't hesitate to call.       Mallory Hancock MD

## 2023-01-13 NOTE — Clinical Note
Deepika Schwartz was seen and treated in our emergency department on 1/13/2023. She may return to work on 01/14/2023. If you have any questions or concerns, please don't hesitate to call.       Remy Trujillo MD

## 2023-01-14 NOTE — ED PROVIDER NOTES
HPI:  1/13/23,   Time: 8:15 PM ABEBA Montano is a 38 y.o. female presenting to the ED for 1 day history of cute onset of low back pain in the mid and on the right side after she was lifting her toddler. She had acute onset of aching and sharp pain, with radiation into the right buttocks and around the front of the right leg down to the knee. She denied any pain below the knee. She denied paresthesias or weakness of the lower extremities. She denied bowel or bladder dysfunction. This on a background history of chronic low back pain, degenerative disc disease at L5-S1 level. Currently she is under the care of of pain management for her chronic pain. ROS:   Pertinent positives and negatives are stated within HPI, all other systems reviewed and are negative.  --------------------------------------------- PAST HISTORY ---------------------------------------------  Past Medical History:  has a past medical history of Abnormal Pap smear of cervix, CAD (coronary artery disease), Chronic back pain, Degenerative disc disease at L5-S1 level, Depression, Edema, Gestational diabetes mellitus (GDM) in third trimester controlled on oral hypoglycemic drug, Hypertension, Rh incompatibility, Rh sensitized, Thyroid disease, and Thyroid disease. Past Surgical History:  has a past surgical history that includes back surgery; Vein Surgery (Bilateral); and LEEP. Social History:  reports that she has been smoking cigarettes. She has a 11.00 pack-year smoking history. She has never used smokeless tobacco. She reports that she does not currently use alcohol. She reports current drug use. Drug: Marijuana Maryellen Ege).     Family History: family history includes Anxiety Disorder in her mother; Depression in her mother; Diabetes in her maternal grandmother; Elevated Lipids in her mother; Heart Disease in her father and maternal grandmother; Hypertension in her mother; Kidney Disease in her maternal grandmother; Stroke in her mother. The patients home medications have been reviewed. Allergies: Patient has no known allergies. -------------------------------------------------- RESULTS -------------------------------------------------  All laboratory and radiology results have been personally reviewed by myself   LABS:  No results found for this visit on 01/13/23. RADIOLOGY:  Interpreted by Radiologist.  No orders to display       ------------------------- NURSING NOTES AND VITALS REVIEWED ---------------------------   The nursing notes within the ED encounter and vital signs as below have been reviewed. BP (!) 148/104   Pulse (!) 116   Temp 97 °F (36.1 °C) (Temporal)   Resp 20   Ht 6' (1.829 m)   Wt (!) 320 lb (145.2 kg)   LMP 01/13/2023   SpO2 100%   BMI 43.40 kg/m²   Oxygen Saturation Interpretation: Normal      ---------------------------------------------------PHYSICAL EXAM--------------------------------------    Constitutional/General: Alert and oriented x3, well appearing, non toxic in NAD  Head: NC/AT  Eyes: PERRL, EOMI  Mouth: Oropharynx clear, handling secretions, no trismus  Neck: Supple, full ROM, no meningeal signs  Pulmonary: Lungs clear to auscultation bilaterally, no wheezes, rales, or rhonchi. Not in respiratory distress  Cardiovascular:  Regular rate and rhythm, no murmurs, gallops, or rubs. 2+ distal pulses  Abdomen: Soft, non tender, non distended,   Extremities: Moves all extremities x 4. Warm and well perfused  Lumbar exam:  There is lower lumbar and sacral tenderness palpation with right SI joint tenderness palpation of moderate intensity. The lower extremities are neurovascular intact.   Skin: warm and dry without rash  Neurologic: GCS 15,  Psych: Normal Affect      ------------------------------ ED COURSE/MEDICAL DECISION MAKING----------------------  Medications   ketorolac (TORADOL) injection 60 mg (60 mg IntraMUSCular Given 1/13/23 2003)   predniSONE (DELTASONE) tablet 60 mg (60 mg Oral Given 1/13/23 2004)         Medical Decision Making:    Straightforward; lumbar spine x-ray from 2018 reviewed and are consistent with degenerative disc disease and spondylosis. Counseling: The emergency provider has spoken with the patient and discussed todays results, in addition to providing specific details for the plan of care and counseling regarding the diagnosis and prognosis. Questions are answered at this time and they are agreeable with the plan.      --------------------------------- IMPRESSION AND DISPOSITION ---------------------------------    IMPRESSION  1. Acute exacerbation of chronic low back pain    2.  Sciatica of right side        DISPOSITION  Disposition: Discharge to home  Patient condition is stable                 Remy Trujillo MD  01/13/23 2017

## 2023-01-23 ENCOUNTER — OFFICE VISIT (OUTPATIENT)
Dept: FAMILY MEDICINE CLINIC | Age: 43
End: 2023-01-23
Payer: COMMERCIAL

## 2023-01-23 VITALS
HEIGHT: 72 IN | HEART RATE: 106 BPM | RESPIRATION RATE: 16 BRPM | WEIGHT: 293 LBS | OXYGEN SATURATION: 99 % | TEMPERATURE: 97 F | BODY MASS INDEX: 39.68 KG/M2 | DIASTOLIC BLOOD PRESSURE: 90 MMHG | SYSTOLIC BLOOD PRESSURE: 120 MMHG

## 2023-01-23 DIAGNOSIS — Z98.890 S/P LUMBAR DISCECTOMY: ICD-10-CM

## 2023-01-23 DIAGNOSIS — M51.36 DDD (DEGENERATIVE DISC DISEASE), LUMBAR: Primary | ICD-10-CM

## 2023-01-23 DIAGNOSIS — F41.9 ANXIETY: ICD-10-CM

## 2023-01-23 DIAGNOSIS — N18.2 STAGE 2 CHRONIC KIDNEY DISEASE: ICD-10-CM

## 2023-01-23 LAB
ALBUMIN SERPL-MCNC: 4.2 G/DL (ref 3.5–5.2)
ANION GAP SERPL CALCULATED.3IONS-SCNC: 16 MMOL/L (ref 7–16)
BUN BLDV-MCNC: 13 MG/DL (ref 6–20)
CALCIUM SERPL-MCNC: 9.8 MG/DL (ref 8.6–10.2)
CHLORIDE BLD-SCNC: 102 MMOL/L (ref 98–107)
CO2: 22 MMOL/L (ref 22–29)
CREAT SERPL-MCNC: 1.2 MG/DL (ref 0.5–1)
GFR SERPL CREATININE-BSD FRML MDRD: 58 ML/MIN/1.73
GLUCOSE BLD-MCNC: 105 MG/DL (ref 74–99)
PHOSPHORUS: 2.7 MG/DL (ref 2.5–4.5)
POTASSIUM SERPL-SCNC: 3.9 MMOL/L (ref 3.5–5)
SODIUM BLD-SCNC: 140 MMOL/L (ref 132–146)

## 2023-01-23 PROCEDURE — G8484 FLU IMMUNIZE NO ADMIN: HCPCS | Performed by: FAMILY MEDICINE

## 2023-01-23 PROCEDURE — 4004F PT TOBACCO SCREEN RCVD TLK: CPT | Performed by: FAMILY MEDICINE

## 2023-01-23 PROCEDURE — G8417 CALC BMI ABV UP PARAM F/U: HCPCS | Performed by: FAMILY MEDICINE

## 2023-01-23 PROCEDURE — 99214 OFFICE O/P EST MOD 30 MIN: CPT | Performed by: FAMILY MEDICINE

## 2023-01-23 PROCEDURE — G8427 DOCREV CUR MEDS BY ELIG CLIN: HCPCS | Performed by: FAMILY MEDICINE

## 2023-01-23 RX ORDER — BUSPIRONE HYDROCHLORIDE 15 MG/1
15 TABLET ORAL 2 TIMES DAILY
Qty: 60 TABLET | Refills: 0 | Status: SHIPPED | OUTPATIENT
Start: 2023-01-23

## 2023-01-23 ASSESSMENT — PATIENT HEALTH QUESTIONNAIRE - PHQ9
SUM OF ALL RESPONSES TO PHQ QUESTIONS 1-9: 9
SUM OF ALL RESPONSES TO PHQ QUESTIONS 1-9: 9
SUM OF ALL RESPONSES TO PHQ9 QUESTIONS 1 & 2: 1
7. TROUBLE CONCENTRATING ON THINGS, SUCH AS READING THE NEWSPAPER OR WATCHING TELEVISION: 1
4. FEELING TIRED OR HAVING LITTLE ENERGY: 2
10. IF YOU CHECKED OFF ANY PROBLEMS, HOW DIFFICULT HAVE THESE PROBLEMS MADE IT FOR YOU TO DO YOUR WORK, TAKE CARE OF THINGS AT HOME, OR GET ALONG WITH OTHER PEOPLE: 1
SUM OF ALL RESPONSES TO PHQ QUESTIONS 1-9: 9
2. FEELING DOWN, DEPRESSED OR HOPELESS: 0
3. TROUBLE FALLING OR STAYING ASLEEP: 3
5. POOR APPETITE OR OVEREATING: 0
9. THOUGHTS THAT YOU WOULD BE BETTER OFF DEAD, OR OF HURTING YOURSELF: 0
6. FEELING BAD ABOUT YOURSELF - OR THAT YOU ARE A FAILURE OR HAVE LET YOURSELF OR YOUR FAMILY DOWN: 1
SUM OF ALL RESPONSES TO PHQ QUESTIONS 1-9: 9
8. MOVING OR SPEAKING SO SLOWLY THAT OTHER PEOPLE COULD HAVE NOTICED. OR THE OPPOSITE, BEING SO FIGETY OR RESTLESS THAT YOU HAVE BEEN MOVING AROUND A LOT MORE THAN USUAL: 1
1. LITTLE INTEREST OR PLEASURE IN DOING THINGS: 1

## 2023-01-23 ASSESSMENT — ENCOUNTER SYMPTOMS
PHOTOPHOBIA: 0
COUGH: 0

## 2023-01-23 NOTE — PROGRESS NOTES
Melonie Patrick (:  1980) is a 38 y.o. female,Established patient, here for evaluation of the following chief complaint(s):  Follow-up (Shingles on both Butt cheeks by the bad nerve) and Back Pain (R side sciatica that is going down R leg causing it to give out)         ASSESSMENT/PLAN:  1. DDD (degenerative disc disease), lumbar  2. Anxiety  -     busPIRone (BUSPAR) 15 MG tablet; Take 15 mg by mouth 2 times daily, Disp-60 tablet, R-0Normal  3. S/P lumbar discectomy  4. Stage 2 chronic kidney disease  -     Renal Function Panel; Future  -     External Referral To Nephrology      No follow-ups on file. Subjective   SUBJECTIVE/OBJECTIVE:  Follow-up (Shingles on both Butt cheeks by the bad nerve) and Back Pain (R side sciatica that is going down R leg causing it to give out)        Review of Systems   Constitutional:  Negative for diaphoresis. HENT:  Negative for hearing loss and tinnitus. Eyes:  Negative for photophobia. Respiratory:  Negative for cough. Cardiovascular:  Negative for chest pain and leg swelling. Genitourinary:  Negative for urgency. Musculoskeletal:  Negative for myalgias. Skin:  Negative for rash. Neurological:  Negative for dizziness, weakness and headaches. Hematological:  Does not bruise/bleed easily. Objective   BP (!) 120/90   Pulse (!) 106   Temp 97 °F (36.1 °C)   Resp 16   Ht 6' (1.829 m)   Wt (!) 340 lb 9.6 oz (154.5 kg)   LMP 2023   SpO2 99%   BMI 46.19 kg/m²   Lab Results   Component Value Date    LABA1C 5.6 2022    LABA1C 5.6 2022     Physical Exam  Constitutional:       General: She is not in acute distress. Appearance: She is well-developed. Eyes:      General: No scleral icterus. Neck:      Thyroid: No thyromegaly. Vascular: No JVD. Trachea: No tracheal deviation. Cardiovascular:      Heart sounds:     No gallop. Pulmonary:      Effort: Pulmonary effort is normal. No respiratory distress. Breath sounds: No wheezing. Abdominal:      Palpations: Abdomen is soft. Musculoskeletal:         General: No tenderness. Skin:     General: Skin is warm. Findings: No erythema. Comments: Shingles S 1-2 areas. Mostly on the right   Neurological:      Deep Tendon Reflexes: Reflexes normal.          On this date 1/23/2023 I have spent 23 minutes reviewing previous notes, test results and face to face with the patient discussing the diagnosis and importance of compliance with the treatment plan as well as documenting on the day of the visit. An electronic signature was used to authenticate this note.     --Remington Shah, DO

## 2023-01-24 ENCOUNTER — TELEPHONE (OUTPATIENT)
Dept: FAMILY MEDICINE CLINIC | Age: 43
End: 2023-01-24

## 2023-01-24 DIAGNOSIS — M51.36 DDD (DEGENERATIVE DISC DISEASE), LUMBAR: ICD-10-CM

## 2023-01-24 DIAGNOSIS — M47.817 LUMBOSACRAL SPONDYLOSIS WITHOUT MYELOPATHY: Primary | ICD-10-CM

## 2023-01-24 DIAGNOSIS — N18.2 STAGE 2 CHRONIC KIDNEY DISEASE: Primary | ICD-10-CM

## 2023-01-24 RX ORDER — TRAMADOL HYDROCHLORIDE 50 MG/1
50 TABLET ORAL EVERY 4 HOURS PRN
Qty: 42 TABLET | Refills: 0 | Status: SHIPPED | OUTPATIENT
Start: 2023-01-24 | End: 2023-01-31

## 2023-01-24 NOTE — TELEPHONE ENCOUNTER
[facility] is requesting the patient have labs done prior to them scheduling the patient. They want a CBC. Can you please order and I'll call the patient to tell her.     Thank you

## 2023-01-25 NOTE — TELEPHONE ENCOUNTER
ASSESSMENT/PLAN:    1. Stage 2 chronic kidney disease  - CBC with Auto Differential; Future        FOLLOW-UP:  prn

## 2023-01-25 NOTE — TELEPHONE ENCOUNTER
L/m on voicemail that for the referral the [facility] is requesting labwork.   Orders are in the computer and she can go have those done

## 2023-01-31 DIAGNOSIS — M51.36 DDD (DEGENERATIVE DISC DISEASE), LUMBAR: ICD-10-CM

## 2023-01-31 DIAGNOSIS — M47.817 LUMBOSACRAL SPONDYLOSIS WITHOUT MYELOPATHY: ICD-10-CM

## 2023-01-31 RX ORDER — TRAMADOL HYDROCHLORIDE 50 MG/1
TABLET ORAL
Qty: 42 TABLET | Refills: 0 | OUTPATIENT
Start: 2023-01-31 | End: 2023-03-02

## 2023-02-05 ENCOUNTER — HOSPITAL ENCOUNTER (EMERGENCY)
Age: 43
Discharge: HOME | End: 2023-02-05
Attending: STUDENT IN AN ORGANIZED HEALTH CARE EDUCATION/TRAINING PROGRAM
Payer: COMMERCIAL

## 2023-02-05 VITALS
HEART RATE: 82 BPM | DIASTOLIC BLOOD PRESSURE: 98 MMHG | OXYGEN SATURATION: 96 % | TEMPERATURE: 98.2 F | SYSTOLIC BLOOD PRESSURE: 138 MMHG | RESPIRATION RATE: 16 BRPM

## 2023-02-05 DIAGNOSIS — J06.9 VIRAL URI: Primary | ICD-10-CM

## 2023-02-05 PROCEDURE — 99283 EMERGENCY DEPT VISIT LOW MDM: CPT

## 2023-02-05 RX ORDER — PREDNISONE 20 MG/1
40 TABLET ORAL DAILY
Qty: 6 TABLET | Refills: 0 | Status: SHIPPED | OUTPATIENT
Start: 2023-02-05 | End: 2023-02-08

## 2023-02-05 ASSESSMENT — PAIN - FUNCTIONAL ASSESSMENT: PAIN_FUNCTIONAL_ASSESSMENT: NONE - DENIES PAIN

## 2023-02-05 NOTE — Clinical Note
Boaz Keene was seen and treated in our emergency department on 2/5/2023. She may return to work on 02/07/2023. If you have any questions or concerns, please don't hesitate to call.       Amarilis Alcantar MD

## 2023-02-06 ASSESSMENT — ENCOUNTER SYMPTOMS
VOMITING: 0
COUGH: 1
SORE THROAT: 1
DIARRHEA: 1
SHORTNESS OF BREATH: 0

## 2023-02-06 NOTE — ED PROVIDER NOTES
HPI  38 y.o. female presenting for cough. Patient started feeling sick Thursday night. She developed fever of 103F, as well as body aches, diarrhea, and cough. She has sore throat with cough and when she tilts her head back to swallow. She denies any chest pain or shortness of breath.      --------------------------------------------- PAST HISTORY ---------------------------------------------  Past Medical History:  has a past medical history of Abnormal Pap smear of cervix, CAD (coronary artery disease), Chronic back pain, Degenerative disc disease at L5-S1 level, Depression, Edema, Gestational diabetes mellitus (GDM) in third trimester controlled on oral hypoglycemic drug, Hypertension, Rh incompatibility, Rh sensitized, Thyroid disease, and Thyroid disease. Past Surgical History:  has a past surgical history that includes back surgery; Vein Surgery (Bilateral); and LEEP. Social History:  reports that she has been smoking cigarettes. She has a 11.00 pack-year smoking history. She has never used smokeless tobacco. She reports that she does not currently use alcohol. She reports current drug use. Drug: Marijuana Lorretta Freud). Family History: family history includes Anxiety Disorder in her mother; Depression in her mother; Diabetes in her maternal grandmother; Elevated Lipids in her mother; Heart Disease in her father and maternal grandmother; Hypertension in her mother; Kidney Disease in her maternal grandmother; Stroke in her mother. The patients home medications have been reviewed. Allergies: Patient has no known allergies. Review of Systems   Constitutional:  Positive for fever. HENT:  Positive for sore throat. Respiratory:  Positive for cough. Negative for shortness of breath. Cardiovascular:  Negative for chest pain. Gastrointestinal:  Positive for diarrhea. Negative for vomiting. Musculoskeletal:  Positive for myalgias. Neurological:  Positive for headaches.       Physical Exam  Constitutional:       General: She is not in acute distress.     Appearance: She is not toxic-appearing.   HENT:      Head: Normocephalic and atraumatic.      Nose: Congestion present.      Mouth/Throat:      Comments: No appreciable tonsillar swelling or erythema; no exudates  Cardiovascular:      Rate and Rhythm: Normal rate and regular rhythm.   Pulmonary:      Effort: Pulmonary effort is normal.      Breath sounds: Normal breath sounds.   Neurological:      Mental Status: She is alert.        Procedures           -------------------------------------------------- RESULTS -------------------------------------------------  Labs:  No results found for this visit on 02/05/23.    Radiology:  No orders to display       ------------------------- NURSING NOTES AND VITALS REVIEWED ---------------------------  Date / Time Roomed:  2/5/2023  6:44 PM  ED Bed Assignment:  01/01    The nursing notes within the ED encounter and vital signs as below have been reviewed.   BP (!) 138/98   Pulse 82   Temp 98.2 °F (36.8 °C) (Temporal)   Resp 16   LMP 02/05/2023 (Exact Date)   SpO2 96%   Oxygen Saturation Interpretation: Normal    Medical Decision Making  41 yo female presenting with URI complaints. Patient was offered COVID and influenza testing but declined. She was also offered CXR but declined. She states symptoms feel like previous episodes of bronchitis. Lung exam was benign and patient is non-toxic appearing. She is not hypoxic and VSS. Clinical presentation is consistent with viral URI with bronchitis. Patient was prescribed prednisone burst and instructed to follow up with PCP.    Risk  Prescription drug management.        ------------------------------------------ PROGRESS NOTES ------------------------------------------  I have spoken with the patient and discussed today’s results, in addition to providing specific details for the plan of care and counseling regarding the diagnosis and prognosis.  Their  questions are answered at this time and they are agreeable with the plan. I discussed at length with them reasons for immediate return here for re evaluation. They will followup with PCP.      --------------------------------- ADDITIONAL PROVIDER NOTES ---------------------------------  At this time the patient is without objective evidence of an acute process requiring hospitalization or inpatient management. They have remained hemodynamically stable throughout their entire ED visit and are stable for discharge with outpatient follow-up. The plan has been discussed in detail and they are aware of the specific conditions for emergent return, as well as the importance of follow-up. Discharge Medication List as of 2/5/2023  7:13 PM        START taking these medications    Details   predniSONE (DELTASONE) 20 MG tablet Take 2 tablets by mouth daily for 3 days, Disp-6 tablet, R-0Normal             Diagnosis:  1.  Viral URI        Disposition:  Patient's disposition: Discharge to home  Patient's condition is stable.'     Shama Burns MD  Resident  02/06/23 [id number]

## 2023-02-28 ENCOUNTER — HOSPITAL ENCOUNTER (EMERGENCY)
Age: 43
Discharge: HOME | End: 2023-02-28
Attending: STUDENT IN AN ORGANIZED HEALTH CARE EDUCATION/TRAINING PROGRAM
Payer: COMMERCIAL

## 2023-02-28 VITALS
TEMPERATURE: 98.6 F | HEIGHT: 72 IN | SYSTOLIC BLOOD PRESSURE: 166 MMHG | OXYGEN SATURATION: 98 % | BODY MASS INDEX: 39.68 KG/M2 | RESPIRATION RATE: 18 BRPM | WEIGHT: 293 LBS | HEART RATE: 98 BPM | DIASTOLIC BLOOD PRESSURE: 110 MMHG

## 2023-02-28 DIAGNOSIS — N39.0 URINARY TRACT INFECTION WITHOUT HEMATURIA, SITE UNSPECIFIED: Primary | ICD-10-CM

## 2023-02-28 DIAGNOSIS — B37.0 THRUSH: ICD-10-CM

## 2023-02-28 LAB
BACTERIA: ABNORMAL /HPF
BILIRUBIN URINE: NEGATIVE
BLOOD, URINE: ABNORMAL
CLARITY: CLEAR
COLOR: YELLOW
EPITHELIAL CELLS, UA: ABNORMAL /HPF
GLUCOSE URINE: NEGATIVE MG/DL
HCG(URINE) PREGNANCY TEST: NEGATIVE
KETONES, URINE: NEGATIVE MG/DL
LEUKOCYTE ESTERASE, URINE: ABNORMAL
NITRITE, URINE: NEGATIVE
PH UA: 6.5 (ref 5–9)
PROTEIN UA: 30 MG/DL
RBC UA: ABNORMAL /HPF (ref 0–2)
SPECIFIC GRAVITY UA: 1.02 (ref 1–1.03)
UROBILINOGEN, URINE: 0.2 E.U./DL
WBC UA: ABNORMAL /HPF (ref 0–5)

## 2023-02-28 PROCEDURE — 81025 URINE PREGNANCY TEST: CPT

## 2023-02-28 PROCEDURE — 87088 URINE BACTERIA CULTURE: CPT

## 2023-02-28 PROCEDURE — 81001 URINALYSIS AUTO W/SCOPE: CPT

## 2023-02-28 PROCEDURE — 99283 EMERGENCY DEPT VISIT LOW MDM: CPT

## 2023-02-28 RX ORDER — CEFDINIR 300 MG/1
300 CAPSULE ORAL 2 TIMES DAILY
Qty: 14 CAPSULE | Refills: 0 | Status: SHIPPED | OUTPATIENT
Start: 2023-02-28 | End: 2023-03-07

## 2023-02-28 ASSESSMENT — ENCOUNTER SYMPTOMS
DIARRHEA: 0
COUGH: 0
ABDOMINAL PAIN: 0
COLOR CHANGE: 0
NAUSEA: 0
BACK PAIN: 0
SHORTNESS OF BREATH: 0
VOMITING: 0

## 2023-02-28 ASSESSMENT — PAIN - FUNCTIONAL ASSESSMENT: PAIN_FUNCTIONAL_ASSESSMENT: NONE - DENIES PAIN

## 2023-02-28 NOTE — Clinical Note
Pat Ross was seen and treated in our emergency department on 2/28/2023. She may return to work on 03/01/2023. If you have any questions or concerns, please don't hesitate to call.       Michael Sanders, DO

## 2023-02-28 NOTE — ED PROVIDER NOTES
HPI   29-year-old female patient presents emergency department for evaluation of generalized fatigue. Patient reporting symptoms ongoing for last few weeks. She was here for URI few weeks prior. She states those symptoms had resolved but she is just feeling tired. She denies any fevers, chest pain, shortness of breath. She notes intermittent lower abdominal pain and intermittent lower back pain. She states last time she had felt like this she had a UTI. She notes that she has what she thinks is thrush and she states it went away by itself previously. She denies any immunocompromise conditions, any history of HIV or AIDS she is not having a sore throat or any pain she is a smoker. She denies having any autoimmune disorder. Currently no nausea vomiting diarrhea cough. She states that intermittently she is getting fevers and chills but none currently. She is on levothyroxine due to low thyroid and states her current thyroid level is in range as most recently checked. She is scheduled to get blood work from PCP. Review of Systems   Constitutional:  Positive for fatigue. Negative for chills and fever. HENT:  Negative for congestion. Thrush   Respiratory:  Negative for cough and shortness of breath. Cardiovascular:  Negative for chest pain. Gastrointestinal:  Negative for abdominal pain, diarrhea, nausea and vomiting. Genitourinary:  Negative for difficulty urinating, dysuria and hematuria. Musculoskeletal:  Negative for back pain. Skin:  Negative for color change. All other systems reviewed and are negative. Physical Exam  Vitals and nursing note reviewed. Constitutional:       Appearance: Normal appearance. HENT:      Head: Normocephalic and atraumatic. Nose: Nose normal. No congestion. Mouth/Throat:      Mouth: Mucous membranes are moist.      Pharynx: Oropharynx is clear.       Comments: Patches on the tongue that easily scraped off no bleeding underneath  Eyes: Conjunctiva/sclera: Conjunctivae normal.      Pupils: Pupils are equal, round, and reactive to light. Cardiovascular:      Rate and Rhythm: Normal rate and regular rhythm. Pulses: Normal pulses. Heart sounds: Normal heart sounds. Pulmonary:      Effort: Pulmonary effort is normal. No respiratory distress. Breath sounds: Normal breath sounds. Abdominal:      General: Bowel sounds are normal. There is no distension. Tenderness: There is no abdominal tenderness. Musculoskeletal:         General: Normal range of motion. Cervical back: Normal range of motion and neck supple. Skin:     General: Skin is warm and dry. Capillary Refill: Capillary refill takes less than 2 seconds. Neurological:      General: No focal deficit present. Mental Status: She is alert. Procedures     MDM     Patient presented to emergency department for evaluation of fatigue. Noting that last time she felt like that she had UTI. Her urine here does show some leukocyte esterase and bacteria but I am not completely convinced. Urine was sent off for culture. Her pregnancy test was negative. She tells me that her thyroid levels have been in good range. She does have thrush and no obvious explanation. I told the patient that she needs to go to her PCP and have further discussion regarding this and likely further work-up for occult condition. In the meantime we can treat patient for possible UTI with CECI CALLEJAS here. She has no known allergies. I did offer her a CBC and a BMP she is declining at this time as she states that she will have lab work done with her PCP.       At present she has no abdominal pain no back pain no chest pain no shortness of breath her vital signs are all completely stable and patient was discharged       --------------------------------------------- PAST HISTORY ---------------------------------------------  Past Medical History:  has a past medical history of Abnormal Pap smear of cervix, CAD (coronary artery disease), Chronic back pain, Degenerative disc disease at L5-S1 level, Depression, Edema, Gestational diabetes mellitus (GDM) in third trimester controlled on oral hypoglycemic drug, Hypertension, Rh incompatibility, Rh sensitized, Thyroid disease, and Thyroid disease. Past Surgical History:  has a past surgical history that includes back surgery; Vein Surgery (Bilateral); and LEEP. Social History:  reports that she has been smoking cigarettes. She has a 11.00 pack-year smoking history. She has never used smokeless tobacco. She reports that she does not currently use alcohol. She reports current drug use. Drug: Marijuana Damien Ric). Family History: family history includes Anxiety Disorder in her mother; Depression in her mother; Diabetes in her maternal grandmother; Elevated Lipids in her mother; Heart Disease in her father and maternal grandmother; Hypertension in her mother; Kidney Disease in her maternal grandmother; Stroke in her mother. The patients home medications have been reviewed. Allergies: Patient has no known allergies.     -------------------------------------------------- RESULTS -------------------------------------------------  Labs:  Results for orders placed or performed during the hospital encounter of 02/28/23   Urinalysis   Result Value Ref Range    Color, UA Yellow Straw/Yellow    Clarity, UA Clear Clear    Glucose, Ur Negative Negative mg/dL    Bilirubin Urine Negative Negative    Ketones, Urine Negative Negative mg/dL    Specific Gravity, UA 1.020 1.005 - 1.030    Blood, Urine MODERATE (A) Negative    pH, UA 6.5 5.0 - 9.0    Protein, UA 30 (A) Negative mg/dL    Urobilinogen, Urine 0.2 <2.0 E.U./dL    Nitrite, Urine Negative Negative    Leukocyte Esterase, Urine TRACE (A) Negative   Pregnancy, urine   Result Value Ref Range    HCG(Urine) Pregnancy Test NEGATIVE NEGATIVE   Microscopic Urinalysis   Result Value Ref Range    WBC, UA 1-3 0 - 5 /HPF    RBC, UA 2-5 0 - 2 /HPF    Epithelial Cells, UA FEW /HPF    Bacteria, UA FEW (A) None Seen /HPF       Radiology:  No orders to display       ------------------------- NURSING NOTES AND VITALS REVIEWED ---------------------------  Date / Time Roomed:  2/28/2023  6:02 PM  ED Bed Assignment:  04/04    The nursing notes within the ED encounter and vital signs as below have been reviewed. BP (!) 166/110   Pulse 98   Temp 98.6 °F (37 °C) (Oral)   Resp 18   Ht 6' 1\" (1.854 m)   Wt (!) 330 lb (149.7 kg)   LMP 02/05/2023 (Exact Date)   SpO2 98%   BMI 43.54 kg/m²   Oxygen Saturation Interpretation: Normal      --------------------------------- ADDITIONAL PROVIDER NOTES ---------------------------------  At this time the patient is without objective evidence of an acute process requiring hospitalization or inpatient management. They have remained hemodynamically stable throughout their entire ED visit and are stable for discharge with outpatient follow-up. The plan has been discussed in detail and they are aware of the specific conditions for emergent return, as well as the importance of follow-up. Discharge Medication List as of 2/28/2023  6:51 PM        START taking these medications    Details   cefdinir (OMNICEF) 300 MG capsule Take 1 capsule by mouth 2 times daily for 7 days, Disp-14 capsule, R-0Normal      nystatin (MYCOSTATIN) 827465 UNIT/ML suspension Take 5 mLs by mouth 4 times daily for 14 days Retain in mouth as long as possible, Oral, 4 TIMES DAILY Starting Tue 2/28/2023, Until Tue 3/14/2023, For 14 days, Disp-280 mL, R-0, Normal             Diagnosis:  1. Urinary tract infection without hematuria, site unspecified    2. Thrush        Disposition:  Patient's disposition: Discharge to home  Patient's condition is stable.        Adis Sorensen DO  Resident  02/28/23 [id number]

## 2023-03-03 LAB — URINE CULTURE, ROUTINE: NORMAL

## 2023-03-16 DIAGNOSIS — E06.3 HYPOTHYROIDISM DUE TO HASHIMOTO'S THYROIDITIS: ICD-10-CM

## 2023-03-16 DIAGNOSIS — E03.8 HYPOTHYROIDISM DUE TO HASHIMOTO'S THYROIDITIS: ICD-10-CM

## 2023-03-16 RX ORDER — LEVOTHYROXINE SODIUM 0.05 MG/1
TABLET ORAL
Qty: 30 TABLET | Refills: 3 | Status: SHIPPED | OUTPATIENT
Start: 2023-03-16

## 2023-04-19 DIAGNOSIS — F41.9 ANXIETY: ICD-10-CM

## 2023-04-19 RX ORDER — BUSPIRONE HYDROCHLORIDE 15 MG/1
TABLET ORAL
Qty: 60 TABLET | Refills: 0 | Status: SHIPPED | OUTPATIENT
Start: 2023-04-19

## 2023-07-17 DIAGNOSIS — F41.9 ANXIETY: ICD-10-CM

## 2023-07-18 RX ORDER — BUSPIRONE HYDROCHLORIDE 15 MG/1
TABLET ORAL
Qty: 30 TABLET | Refills: 0 | Status: SHIPPED | OUTPATIENT
Start: 2023-07-18

## 2023-07-23 DIAGNOSIS — E06.3 HYPOTHYROIDISM DUE TO HASHIMOTO'S THYROIDITIS: ICD-10-CM

## 2023-07-23 DIAGNOSIS — E03.8 HYPOTHYROIDISM DUE TO HASHIMOTO'S THYROIDITIS: ICD-10-CM

## 2023-07-24 RX ORDER — LEVOTHYROXINE SODIUM 0.05 MG/1
TABLET ORAL
Qty: 30 TABLET | Refills: 0 | Status: SHIPPED | OUTPATIENT
Start: 2023-07-24

## 2023-08-09 ENCOUNTER — HOSPITAL ENCOUNTER (EMERGENCY)
Age: 43
Discharge: HOME | End: 2023-08-09
Attending: EMERGENCY MEDICINE
Payer: COMMERCIAL

## 2023-08-09 VITALS
RESPIRATION RATE: 16 BRPM | OXYGEN SATURATION: 96 % | TEMPERATURE: 97.5 F | SYSTOLIC BLOOD PRESSURE: 148 MMHG | HEART RATE: 96 BPM | DIASTOLIC BLOOD PRESSURE: 100 MMHG

## 2023-08-09 DIAGNOSIS — R30.0 DYSURIA: Primary | ICD-10-CM

## 2023-08-09 LAB
BACTERIA URNS QL MICRO: ABNORMAL
BILIRUB UR QL STRIP: NEGATIVE
CLARITY UR: CLEAR
COLOR UR: YELLOW
EPI CELLS #/AREA URNS HPF: ABNORMAL /HPF
GLUCOSE UR STRIP-MCNC: NEGATIVE MG/DL
HCG UR QL: NEGATIVE
HGB UR QL STRIP.AUTO: ABNORMAL
KETONES UR STRIP-MCNC: NEGATIVE MG/DL
LEUKOCYTE ESTERASE UR QL STRIP: NEGATIVE
NITRITE UR QL STRIP: NEGATIVE
PH UR STRIP: 5.5 [PH] (ref 5–9)
PROT UR STRIP-MCNC: ABNORMAL MG/DL
RBC #/AREA URNS HPF: ABNORMAL /HPF
SP GR UR STRIP: 1.02 (ref 1–1.03)
UROBILINOGEN UR STRIP-ACNC: 0.2 EU/DL (ref 0–1)
WBC #/AREA URNS HPF: ABNORMAL /HPF

## 2023-08-09 PROCEDURE — 84703 CHORIONIC GONADOTROPIN ASSAY: CPT

## 2023-08-09 PROCEDURE — 99283 EMERGENCY DEPT VISIT LOW MDM: CPT

## 2023-08-09 PROCEDURE — 81001 URINALYSIS AUTO W/SCOPE: CPT

## 2023-08-09 RX ORDER — PHENAZOPYRIDINE HYDROCHLORIDE 100 MG/1
100 TABLET, FILM COATED ORAL 3 TIMES DAILY PRN
Qty: 6 TABLET | Refills: 0 | Status: SHIPPED | OUTPATIENT
Start: 2023-08-09 | End: 2023-08-12

## 2023-08-09 ASSESSMENT — ENCOUNTER SYMPTOMS
COUGH: 0
BACK PAIN: 0
NAUSEA: 0
SHORTNESS OF BREATH: 0
ABDOMINAL PAIN: 0
BLOOD IN STOOL: 0
VOMITING: 0

## 2023-08-09 ASSESSMENT — PAIN DESCRIPTION - LOCATION: LOCATION: BACK

## 2023-08-09 ASSESSMENT — PAIN DESCRIPTION - DESCRIPTORS: DESCRIPTORS: PRESSURE

## 2023-08-09 ASSESSMENT — PAIN SCALES - GENERAL
PAINLEVEL_OUTOF10: 5
PAINLEVEL_OUTOF10: 5

## 2023-08-09 ASSESSMENT — PAIN - FUNCTIONAL ASSESSMENT
PAIN_FUNCTIONAL_ASSESSMENT: 0-10
PAIN_FUNCTIONAL_ASSESSMENT: 0-10

## 2023-08-09 ASSESSMENT — PAIN DESCRIPTION - ORIENTATION: ORIENTATION: LOWER

## 2023-08-09 NOTE — ED PROVIDER NOTES
The history is provided by the patient. Dysuria   This is a new problem. The current episode started more than 2 days ago. The problem occurs every urination. The quality of the pain is described as burning. The pain is mild. There has been no fever. Associated symptoms include frequency and urgency. Pertinent negatives include no chills, no nausea, no vomiting, no discharge and no hematuria. Review of Systems   Constitutional:  Negative for chills and fever. Respiratory:  Negative for cough and shortness of breath. Cardiovascular:  Negative for chest pain. Gastrointestinal:  Negative for abdominal pain, blood in stool, nausea and vomiting. Genitourinary:  Positive for dysuria, frequency and urgency. Negative for hematuria. Musculoskeletal:  Negative for back pain. Skin:  Negative for rash. Neurological:  Negative for weakness and headaches. All other systems reviewed and are negative. Physical Exam  Vitals and nursing note reviewed. Constitutional:       Appearance: She is well-developed. HENT:      Head: Normocephalic and atraumatic. Right Ear: Hearing and external ear normal.      Left Ear: Hearing and external ear normal.      Nose: Nose normal.      Mouth/Throat:      Pharynx: Uvula midline. Eyes:      General: Lids are normal.      Conjunctiva/sclera: Conjunctivae normal.      Pupils: Pupils are equal, round, and reactive to light. Cardiovascular:      Rate and Rhythm: Normal rate and regular rhythm. Heart sounds: Normal heart sounds. No murmur heard. Pulmonary:      Effort: Pulmonary effort is normal. No respiratory distress. Breath sounds: Normal breath sounds. No wheezing or rales. Abdominal:      General: Bowel sounds are normal.      Palpations: Abdomen is soft. Abdomen is not rigid. Tenderness: There is no abdominal tenderness. There is no guarding or rebound. Musculoskeletal:      Cervical back: Normal range of motion and neck supple.

## 2023-08-15 DIAGNOSIS — F32.A DEPRESSION, UNSPECIFIED DEPRESSION TYPE: ICD-10-CM

## 2023-08-16 RX ORDER — FLUOXETINE HYDROCHLORIDE 40 MG/1
CAPSULE ORAL
Qty: 30 CAPSULE | Refills: 3 | Status: SHIPPED | OUTPATIENT
Start: 2023-08-16

## 2023-08-24 DIAGNOSIS — F41.9 ANXIETY: ICD-10-CM

## 2023-08-24 RX ORDER — BUSPIRONE HYDROCHLORIDE 15 MG/1
TABLET ORAL
Qty: 30 TABLET | Refills: 2 | Status: SHIPPED | OUTPATIENT
Start: 2023-08-24

## 2023-08-24 NOTE — TELEPHONE ENCOUNTER
Requested Prescriptions     Pending Prescriptions Disp Refills    busPIRone (BUSPAR) 15 MG tablet [Pharmacy Med Name: BUSPIRONE HCL 15 MG TABLET] 30 tablet 2     Sig: take 1 tablet by mouth twice a day       Next appt is Visit date not found  Last appt was 1/23/2023

## 2023-09-04 DIAGNOSIS — E06.3 HYPOTHYROIDISM DUE TO HASHIMOTO'S THYROIDITIS: ICD-10-CM

## 2023-09-04 DIAGNOSIS — E03.8 HYPOTHYROIDISM DUE TO HASHIMOTO'S THYROIDITIS: ICD-10-CM

## 2023-09-05 RX ORDER — LEVOTHYROXINE SODIUM 0.05 MG/1
TABLET ORAL
Qty: 30 TABLET | Refills: 3 | Status: SHIPPED | OUTPATIENT
Start: 2023-09-05

## 2023-09-05 NOTE — TELEPHONE ENCOUNTER
Requested Prescriptions     Pending Prescriptions Disp Refills    levothyroxine (SYNTHROID) 50 MCG tablet [Pharmacy Med Name: LEVOTHYROXINE 50 MCG TABLET] 30 tablet 3     Sig: take 1 tablet by mouth once daily       Next appt is Visit date not found  Last appt was 1/23/2023

## 2023-11-27 ENCOUNTER — HOSPITAL ENCOUNTER (EMERGENCY)
Age: 43
Discharge: HOME | End: 2023-11-27
Attending: EMERGENCY MEDICINE
Payer: COMMERCIAL

## 2023-11-27 VITALS
HEART RATE: 92 BPM | TEMPERATURE: 98.6 F | RESPIRATION RATE: 16 BRPM | OXYGEN SATURATION: 97 % | DIASTOLIC BLOOD PRESSURE: 100 MMHG | HEIGHT: 72 IN | WEIGHT: 293 LBS | BODY MASS INDEX: 39.68 KG/M2 | SYSTOLIC BLOOD PRESSURE: 160 MMHG

## 2023-11-27 DIAGNOSIS — U07.1 COVID-19: Primary | ICD-10-CM

## 2023-11-27 LAB
SARS-COV-2 RDRP RESP QL NAA+PROBE: DETECTED
SPECIMEN DESCRIPTION: ABNORMAL

## 2023-11-27 PROCEDURE — 87635 SARS-COV-2 COVID-19 AMP PRB: CPT

## 2023-11-27 PROCEDURE — 96372 THER/PROPH/DIAG INJ SC/IM: CPT

## 2023-11-27 PROCEDURE — 99284 EMERGENCY DEPT VISIT MOD MDM: CPT

## 2023-11-27 PROCEDURE — 6360000002 HC RX W HCPCS: Performed by: EMERGENCY MEDICINE

## 2023-11-27 RX ORDER — KETOROLAC TROMETHAMINE 30 MG/ML
30 INJECTION, SOLUTION INTRAMUSCULAR; INTRAVENOUS ONCE
Status: COMPLETED | OUTPATIENT
Start: 2023-11-27 | End: 2023-11-27

## 2023-11-27 RX ORDER — DEXTROMETHORPHAN HYDROBROMIDE AND PROMETHAZINE HYDROCHLORIDE 15; 6.25 MG/5ML; MG/5ML
5 SYRUP ORAL 4 TIMES DAILY PRN
Qty: 120 ML | Refills: 0 | Status: SHIPPED | OUTPATIENT
Start: 2023-11-27 | End: 2023-12-04

## 2023-11-27 RX ADMIN — KETOROLAC TROMETHAMINE 30 MG: 30 INJECTION, SOLUTION INTRAMUSCULAR; INTRAVENOUS at 17:45

## 2023-11-27 ASSESSMENT — PAIN DESCRIPTION - LOCATION: LOCATION: HEAD

## 2023-11-27 ASSESSMENT — ENCOUNTER SYMPTOMS
EYE REDNESS: 0
NAUSEA: 0
ABDOMINAL PAIN: 0
EYE DISCHARGE: 0
WHEEZING: 0
ABDOMINAL DISTENTION: 0
BACK PAIN: 0
SINUS PRESSURE: 0
BLOOD IN STOOL: 0
SHORTNESS OF BREATH: 0
EYE PAIN: 0
SORE THROAT: 0
COUGH: 1
VOMITING: 0
DIARRHEA: 0

## 2023-11-27 ASSESSMENT — PAIN - FUNCTIONAL ASSESSMENT: PAIN_FUNCTIONAL_ASSESSMENT: 0-10

## 2023-11-27 ASSESSMENT — PAIN DESCRIPTION - FREQUENCY: FREQUENCY: CONTINUOUS

## 2023-11-27 ASSESSMENT — PAIN DESCRIPTION - DESCRIPTORS: DESCRIPTORS: ACHING

## 2023-11-27 ASSESSMENT — PAIN DESCRIPTION - PAIN TYPE: TYPE: ACUTE PAIN

## 2023-11-27 ASSESSMENT — PAIN SCALES - GENERAL: PAINLEVEL_OUTOF10: 9

## 2023-12-10 DIAGNOSIS — F41.9 ANXIETY: ICD-10-CM

## 2023-12-11 RX ORDER — BUSPIRONE HYDROCHLORIDE 15 MG/1
TABLET ORAL
Qty: 30 TABLET | Refills: 2 | Status: SHIPPED | OUTPATIENT
Start: 2023-12-11

## 2023-12-14 DIAGNOSIS — F32.A DEPRESSION, UNSPECIFIED DEPRESSION TYPE: ICD-10-CM

## 2023-12-14 RX ORDER — FLUOXETINE HYDROCHLORIDE 40 MG/1
CAPSULE ORAL
Qty: 30 CAPSULE | Refills: 0 | Status: SHIPPED | OUTPATIENT
Start: 2023-12-14

## 2023-12-27 ENCOUNTER — OFFICE VISIT (OUTPATIENT)
Dept: FAMILY MEDICINE CLINIC | Age: 43
End: 2023-12-27
Payer: COMMERCIAL

## 2023-12-27 VITALS
DIASTOLIC BLOOD PRESSURE: 102 MMHG | WEIGHT: 293 LBS | RESPIRATION RATE: 16 BRPM | HEART RATE: 85 BPM | SYSTOLIC BLOOD PRESSURE: 140 MMHG | TEMPERATURE: 96.9 F | HEIGHT: 72 IN | OXYGEN SATURATION: 98 % | BODY MASS INDEX: 39.68 KG/M2

## 2023-12-27 DIAGNOSIS — E03.8 HYPOTHYROIDISM DUE TO HASHIMOTO'S THYROIDITIS: ICD-10-CM

## 2023-12-27 DIAGNOSIS — I10 ESSENTIAL HYPERTENSION, BENIGN: Primary | ICD-10-CM

## 2023-12-27 DIAGNOSIS — I10 ESSENTIAL HYPERTENSION, BENIGN: ICD-10-CM

## 2023-12-27 DIAGNOSIS — F32.A DEPRESSION, UNSPECIFIED DEPRESSION TYPE: ICD-10-CM

## 2023-12-27 DIAGNOSIS — E06.3 HYPOTHYROIDISM DUE TO HASHIMOTO'S THYROIDITIS: ICD-10-CM

## 2023-12-27 LAB
ANION GAP SERPL CALCULATED.3IONS-SCNC: 12 MMOL/L (ref 7–16)
BUN BLDV-MCNC: 10 MG/DL (ref 6–20)
CALCIUM SERPL-MCNC: 9.3 MG/DL (ref 8.6–10.2)
CHLORIDE BLD-SCNC: 103 MMOL/L (ref 98–107)
CO2: 23 MMOL/L (ref 22–29)
CREAT SERPL-MCNC: 1 MG/DL (ref 0.5–1)
GFR SERPL CREATININE-BSD FRML MDRD: >60 ML/MIN/1.73M2
GLUCOSE BLD-MCNC: 122 MG/DL (ref 74–99)
POTASSIUM SERPL-SCNC: 4.1 MMOL/L (ref 3.5–5)
SODIUM BLD-SCNC: 138 MMOL/L (ref 132–146)
TSH SERPL DL<=0.05 MIU/L-ACNC: 2.08 UIU/ML (ref 0.27–4.2)

## 2023-12-27 PROCEDURE — 3077F SYST BP >= 140 MM HG: CPT | Performed by: NURSE PRACTITIONER

## 2023-12-27 PROCEDURE — G8427 DOCREV CUR MEDS BY ELIG CLIN: HCPCS | Performed by: NURSE PRACTITIONER

## 2023-12-27 PROCEDURE — 4004F PT TOBACCO SCREEN RCVD TLK: CPT | Performed by: NURSE PRACTITIONER

## 2023-12-27 PROCEDURE — 3080F DIAST BP >= 90 MM HG: CPT | Performed by: NURSE PRACTITIONER

## 2023-12-27 PROCEDURE — G8417 CALC BMI ABV UP PARAM F/U: HCPCS | Performed by: NURSE PRACTITIONER

## 2023-12-27 PROCEDURE — G8484 FLU IMMUNIZE NO ADMIN: HCPCS | Performed by: NURSE PRACTITIONER

## 2023-12-27 PROCEDURE — 99214 OFFICE O/P EST MOD 30 MIN: CPT | Performed by: NURSE PRACTITIONER

## 2023-12-27 RX ORDER — LISINOPRIL 10 MG/1
10 TABLET ORAL DAILY
Qty: 30 TABLET | Refills: 1 | Status: SHIPPED | OUTPATIENT
Start: 2023-12-27

## 2023-12-27 RX ORDER — LEVOTHYROXINE SODIUM 0.05 MG/1
50 TABLET ORAL DAILY
Qty: 30 TABLET | Refills: 3 | Status: SHIPPED | OUTPATIENT
Start: 2023-12-27

## 2023-12-27 NOTE — PROGRESS NOTES
Appearance: Normal appearance. She is well-developed. She is obese. HENT:      Head: Normocephalic and atraumatic. Neck:      Thyroid: No thyromegaly. Trachea: No tracheal deviation. Cardiovascular:      Rate and Rhythm: Normal rate and regular rhythm. Heart sounds: Normal heart sounds. No murmur heard. Pulmonary:      Effort: Pulmonary effort is normal. No respiratory distress. Breath sounds: Normal breath sounds. No wheezing, rhonchi or rales. Chest:      Chest wall: No tenderness. Abdominal:      General: Bowel sounds are normal.      Palpations: Abdomen is soft. Tenderness: There is no abdominal tenderness. Lymphadenopathy:      Cervical: No cervical adenopathy. Skin:     General: Skin is warm and dry. Neurological:      Mental Status: She is alert and oriented to person, place, and time. Psychiatric:         Mood and Affect: Mood normal.         Behavior: Behavior normal.            [facility] moderate      An electronic signature was used to authenticate this note.     --MARY Solitario - CNP

## 2023-12-29 DIAGNOSIS — R73.09 ELEVATED GLUCOSE: Primary | ICD-10-CM

## 2024-02-01 ENCOUNTER — OFFICE VISIT (OUTPATIENT)
Dept: FAMILY MEDICINE CLINIC | Age: 44
End: 2024-02-01
Payer: COMMERCIAL

## 2024-02-01 VITALS — BODY MASS INDEX: 47.96 KG/M2 | SYSTOLIC BLOOD PRESSURE: 138 MMHG | HEIGHT: 72 IN | DIASTOLIC BLOOD PRESSURE: 88 MMHG

## 2024-02-01 DIAGNOSIS — J40 BRONCHITIS: ICD-10-CM

## 2024-02-01 DIAGNOSIS — R05.1 ACUTE COUGH: Primary | ICD-10-CM

## 2024-02-01 DIAGNOSIS — R06.02 SOB (SHORTNESS OF BREATH): ICD-10-CM

## 2024-02-01 DIAGNOSIS — J06.9 VIRAL URI: ICD-10-CM

## 2024-02-01 LAB
INFLUENZA A ANTIBODY: NEGATIVE
INFLUENZA B ANTIBODY: NEGATIVE
Lab: NORMAL
PERFORMING INSTRUMENT: NORMAL
QC PASS/FAIL: NORMAL
SARS-COV-2, POC: NORMAL

## 2024-02-01 PROCEDURE — 87426 SARSCOV CORONAVIRUS AG IA: CPT | Performed by: NURSE PRACTITIONER

## 2024-02-01 PROCEDURE — 4004F PT TOBACCO SCREEN RCVD TLK: CPT | Performed by: NURSE PRACTITIONER

## 2024-02-01 PROCEDURE — G8427 DOCREV CUR MEDS BY ELIG CLIN: HCPCS | Performed by: NURSE PRACTITIONER

## 2024-02-01 PROCEDURE — G8484 FLU IMMUNIZE NO ADMIN: HCPCS | Performed by: NURSE PRACTITIONER

## 2024-02-01 PROCEDURE — 99213 OFFICE O/P EST LOW 20 MIN: CPT | Performed by: NURSE PRACTITIONER

## 2024-02-01 PROCEDURE — 87804 INFLUENZA ASSAY W/OPTIC: CPT | Performed by: NURSE PRACTITIONER

## 2024-02-01 PROCEDURE — G8417 CALC BMI ABV UP PARAM F/U: HCPCS | Performed by: NURSE PRACTITIONER

## 2024-02-01 RX ORDER — FLUTICASONE PROPIONATE AND SALMETEROL XINAFOATE 115; 21 UG/1; UG/1
2 AEROSOL, METERED RESPIRATORY (INHALATION) 2 TIMES DAILY
Qty: 1 EACH | Refills: 1 | Status: SHIPPED | OUTPATIENT
Start: 2024-02-01

## 2024-02-01 RX ORDER — METHYLPREDNISOLONE 4 MG/1
TABLET ORAL
Qty: 1 KIT | Refills: 0 | Status: SHIPPED | OUTPATIENT
Start: 2024-02-01

## 2024-02-01 RX ORDER — DEXTROMETHORPHAN HYDROBROMIDE AND PROMETHAZINE HYDROCHLORIDE 15; 6.25 MG/5ML; MG/5ML
5 SYRUP ORAL 4 TIMES DAILY PRN
Qty: 120 ML | Refills: 0 | Status: SHIPPED | OUTPATIENT
Start: 2024-02-01 | End: 2024-02-11

## 2024-02-01 RX ORDER — ALBUTEROL SULFATE 90 UG/1
2 AEROSOL, METERED RESPIRATORY (INHALATION) EVERY 4 HOURS PRN
Qty: 1 EACH | Refills: 0 | Status: SHIPPED | OUTPATIENT
Start: 2024-02-01

## 2024-02-01 ASSESSMENT — ENCOUNTER SYMPTOMS
DIARRHEA: 0
NAUSEA: 0
CONSTIPATION: 0
BACK PAIN: 0
RHINORRHEA: 0
SINUS PRESSURE: 1
SORE THROAT: 1
SHORTNESS OF BREATH: 0
COUGH: 1
VOMITING: 0
WHEEZING: 1
CHEST TIGHTNESS: 0

## 2024-02-01 ASSESSMENT — PATIENT HEALTH QUESTIONNAIRE - PHQ9
6. FEELING BAD ABOUT YOURSELF - OR THAT YOU ARE A FAILURE OR HAVE LET YOURSELF OR YOUR FAMILY DOWN: 0
4. FEELING TIRED OR HAVING LITTLE ENERGY: 0
SUM OF ALL RESPONSES TO PHQ QUESTIONS 1-9: 0
1. LITTLE INTEREST OR PLEASURE IN DOING THINGS: 0
SUM OF ALL RESPONSES TO PHQ QUESTIONS 1-9: 0
SUM OF ALL RESPONSES TO PHQ QUESTIONS 1-9: 0
5. POOR APPETITE OR OVEREATING: 0
8. MOVING OR SPEAKING SO SLOWLY THAT OTHER PEOPLE COULD HAVE NOTICED. OR THE OPPOSITE, BEING SO FIGETY OR RESTLESS THAT YOU HAVE BEEN MOVING AROUND A LOT MORE THAN USUAL: 0
2. FEELING DOWN, DEPRESSED OR HOPELESS: 0
SUM OF ALL RESPONSES TO PHQ9 QUESTIONS 1 & 2: 0
SUM OF ALL RESPONSES TO PHQ QUESTIONS 1-9: 0
7. TROUBLE CONCENTRATING ON THINGS, SUCH AS READING THE NEWSPAPER OR WATCHING TELEVISION: 0
9. THOUGHTS THAT YOU WOULD BE BETTER OFF DEAD, OR OF HURTING YOURSELF: 0
10. IF YOU CHECKED OFF ANY PROBLEMS, HOW DIFFICULT HAVE THESE PROBLEMS MADE IT FOR YOU TO DO YOUR WORK, TAKE CARE OF THINGS AT HOME, OR GET ALONG WITH OTHER PEOPLE: 0
3. TROUBLE FALLING OR STAYING ASLEEP: 0

## 2024-02-01 NOTE — PROGRESS NOTES
Jimi Schaefer (:  1980) is a 43 y.o. female,Established patient, here for evaluation of the following chief complaint(s):  Cough (Chest and nasal congestion: started Tuesday)         ASSESSMENT/PLAN:  1. Acute cough  -     POCT COVID-19, Antigen  -     POCT Influenza A/B  -     promethazine-dextromethorphan (PROMETHAZINE-DM) 6.25-15 MG/5ML syrup; Take 5 mLs by mouth 4 times daily as needed for Cough, Disp-120 mL, R-0Normal  2. Bronchitis  -     albuterol sulfate HFA (PROVENTIL;VENTOLIN;PROAIR) 108 (90 Base) MCG/ACT inhaler; Inhale 2 puffs into the lungs every 4 hours as needed for Wheezing or Shortness of Breath, Disp-1 each, R-0Normal  -     fluticasone-salmeterol (ADVAIR HFA) 115-21 MCG/ACT inhaler; Inhale 2 puffs into the lungs 2 times daily, Disp-1 each, R-1Normal  3. SOB (shortness of breath)  -     methylPREDNISolone (MEDROL, DEYANIRA,) 4 MG tablet; Take by mouth as directed, Disp-1 kit, R-0Normal  4. Viral URI  -     methylPREDNISolone (MEDROL, DEYANIRA,) 4 MG tablet; Take by mouth as directed, Disp-1 kit, R-0Normal  -  Reviewed side effects of medication and patient verbalizes understanding.   -  Advised to call back directly if there are further questions, or if these symptoms fail to improve as anticipated or worsen.  -  start OTC mucinex  -  Upper respiratory infection, viral  most likely have an upper respiratory infection, which is usually caused by a virus.  Antibiotics are not helpful for viral infections and can actually be harmful if you don't need them.  It is best to treat the symptoms, drink plenty of fluids and rest.  To help relieve your symptoms, I suggest the following over-the-counter treatments:   For fevers or pain: acetaminophen (Tylenol) or ibuprofen (Advil, Motrin) or naproxen (Aleve)  For dry cough: medications containing dextromethorphan, such as Delsym, Robitussin DM or Mucinex DM  For congestion or sinus pressure: medications containing guaifenesin to help break up mucus, such as

## 2024-02-02 ENCOUNTER — TELEPHONE (OUTPATIENT)
Dept: FAMILY MEDICINE CLINIC | Age: 44
End: 2024-02-02

## 2024-02-02 NOTE — TELEPHONE ENCOUNTER
Submitted prior auth for fluticasone-salmeterol  fluticasone-salmeterol (ADVAIR HFA) 115-21 MCG/ACT inhaler

## 2024-02-06 DIAGNOSIS — R06.02 SOB (SHORTNESS OF BREATH): ICD-10-CM

## 2024-02-06 DIAGNOSIS — J40 BRONCHITIS: Primary | ICD-10-CM

## 2024-02-06 RX ORDER — BUDESONIDE AND FORMOTEROL FUMARATE DIHYDRATE 160; 4.5 UG/1; UG/1
2 AEROSOL RESPIRATORY (INHALATION) 2 TIMES DAILY
Qty: 10.2 G | Refills: 3 | Status: SHIPPED | OUTPATIENT
Start: 2024-02-06

## 2024-02-07 ENCOUNTER — TELEPHONE (OUTPATIENT)
Dept: FAMILY MEDICINE CLINIC | Age: 44
End: 2024-02-07

## 2024-02-07 DIAGNOSIS — J22 ACUTE RESPIRATORY INFECTION: Primary | ICD-10-CM

## 2024-02-07 RX ORDER — AMOXICILLIN AND CLAVULANATE POTASSIUM 875; 125 MG/1; MG/1
1 TABLET, FILM COATED ORAL 2 TIMES DAILY
Qty: 14 TABLET | Refills: 0 | Status: SHIPPED | OUTPATIENT
Start: 2024-02-07 | End: 2024-02-14

## 2024-02-07 RX ORDER — FLUCONAZOLE 150 MG/1
150 TABLET ORAL ONCE
Qty: 1 TABLET | Refills: 1 | Status: SHIPPED | OUTPATIENT
Start: 2024-02-07 | End: 2024-02-07

## 2024-02-07 NOTE — TELEPHONE ENCOUNTER
Still not feeling better from when she was in on 2/1/24 when she saw Ary.  Her ears are plugged and hurt.  Ary told her to call back if her symptoms weren't better or worse.

## 2024-02-15 DIAGNOSIS — F32.A DEPRESSION, UNSPECIFIED DEPRESSION TYPE: ICD-10-CM

## 2024-02-15 RX ORDER — FLUOXETINE HYDROCHLORIDE 40 MG/1
CAPSULE ORAL
Qty: 90 CAPSULE | Refills: 1 | Status: SHIPPED | OUTPATIENT
Start: 2024-02-15

## 2024-02-24 DIAGNOSIS — I10 ESSENTIAL HYPERTENSION, BENIGN: ICD-10-CM

## 2024-02-26 RX ORDER — LISINOPRIL 10 MG/1
10 TABLET ORAL DAILY
Qty: 30 TABLET | Refills: 1 | Status: SHIPPED | OUTPATIENT
Start: 2024-02-26

## 2024-03-11 DIAGNOSIS — F41.9 ANXIETY: ICD-10-CM

## 2024-03-11 RX ORDER — BUSPIRONE HYDROCHLORIDE 15 MG/1
TABLET ORAL
Qty: 30 TABLET | Refills: 2 | Status: SHIPPED | OUTPATIENT
Start: 2024-03-11

## 2024-03-13 ENCOUNTER — OFFICE VISIT (OUTPATIENT)
Dept: FAMILY MEDICINE CLINIC | Age: 44
End: 2024-03-13
Payer: COMMERCIAL

## 2024-03-13 VITALS
TEMPERATURE: 96.9 F | DIASTOLIC BLOOD PRESSURE: 92 MMHG | WEIGHT: 293 LBS | SYSTOLIC BLOOD PRESSURE: 136 MMHG | HEART RATE: 80 BPM | BODY MASS INDEX: 39.68 KG/M2 | RESPIRATION RATE: 16 BRPM | HEIGHT: 72 IN | OXYGEN SATURATION: 98 %

## 2024-03-13 DIAGNOSIS — L30.9 ECZEMA, UNSPECIFIED TYPE: ICD-10-CM

## 2024-03-13 DIAGNOSIS — I10 ESSENTIAL HYPERTENSION, BENIGN: Primary | ICD-10-CM

## 2024-03-13 PROCEDURE — G8427 DOCREV CUR MEDS BY ELIG CLIN: HCPCS | Performed by: NURSE PRACTITIONER

## 2024-03-13 PROCEDURE — 3075F SYST BP GE 130 - 139MM HG: CPT | Performed by: NURSE PRACTITIONER

## 2024-03-13 PROCEDURE — G8417 CALC BMI ABV UP PARAM F/U: HCPCS | Performed by: NURSE PRACTITIONER

## 2024-03-13 PROCEDURE — G8484 FLU IMMUNIZE NO ADMIN: HCPCS | Performed by: NURSE PRACTITIONER

## 2024-03-13 PROCEDURE — 4004F PT TOBACCO SCREEN RCVD TLK: CPT | Performed by: NURSE PRACTITIONER

## 2024-03-13 PROCEDURE — 99214 OFFICE O/P EST MOD 30 MIN: CPT | Performed by: NURSE PRACTITIONER

## 2024-03-13 PROCEDURE — 3080F DIAST BP >= 90 MM HG: CPT | Performed by: NURSE PRACTITIONER

## 2024-03-13 RX ORDER — CRISABOROLE 20 MG/G
1 OINTMENT TOPICAL 2 TIMES DAILY
Qty: 1 EACH | Refills: 0 | Status: SHIPPED | OUTPATIENT
Start: 2024-03-13

## 2024-03-13 RX ORDER — AMLODIPINE BESYLATE 5 MG/1
5 TABLET ORAL DAILY
Qty: 30 TABLET | Refills: 3 | Status: SHIPPED | OUTPATIENT
Start: 2024-03-13

## 2024-03-13 ASSESSMENT — ENCOUNTER SYMPTOMS
DIARRHEA: 1
NAUSEA: 0
VOMITING: 0
SHORTNESS OF BREATH: 0
WHEEZING: 1
CONSTIPATION: 0
COUGH: 1

## 2024-03-13 ASSESSMENT — PATIENT HEALTH QUESTIONNAIRE - PHQ9
2. FEELING DOWN, DEPRESSED OR HOPELESS: 0
SUM OF ALL RESPONSES TO PHQ9 QUESTIONS 1 & 2: 0
8. MOVING OR SPEAKING SO SLOWLY THAT OTHER PEOPLE COULD HAVE NOTICED. OR THE OPPOSITE, BEING SO FIGETY OR RESTLESS THAT YOU HAVE BEEN MOVING AROUND A LOT MORE THAN USUAL: 0
6. FEELING BAD ABOUT YOURSELF - OR THAT YOU ARE A FAILURE OR HAVE LET YOURSELF OR YOUR FAMILY DOWN: 0
SUM OF ALL RESPONSES TO PHQ QUESTIONS 1-9: 2
7. TROUBLE CONCENTRATING ON THINGS, SUCH AS READING THE NEWSPAPER OR WATCHING TELEVISION: 0
SUM OF ALL RESPONSES TO PHQ QUESTIONS 1-9: 2
3. TROUBLE FALLING OR STAYING ASLEEP: 1
9. THOUGHTS THAT YOU WOULD BE BETTER OFF DEAD, OR OF HURTING YOURSELF: 0
SUM OF ALL RESPONSES TO PHQ QUESTIONS 1-9: 2
4. FEELING TIRED OR HAVING LITTLE ENERGY: 1
5. POOR APPETITE OR OVEREATING: 0
10. IF YOU CHECKED OFF ANY PROBLEMS, HOW DIFFICULT HAVE THESE PROBLEMS MADE IT FOR YOU TO DO YOUR WORK, TAKE CARE OF THINGS AT HOME, OR GET ALONG WITH OTHER PEOPLE: 1
SUM OF ALL RESPONSES TO PHQ QUESTIONS 1-9: 2
1. LITTLE INTEREST OR PLEASURE IN DOING THINGS: 0

## 2024-03-13 NOTE — PROGRESS NOTES
Jimi Schaefer (:  1980) is a 43 y.o. female,Established patient, here for evaluation of the following chief complaint(s):  Hypertension (Follow up: stopped taking lisinopril making her cough and swell)         ASSESSMENT/PLAN:  1. Essential hypertension, benign  -     amLODIPine (NORVASC) 5 MG tablet; Take 1 tablet by mouth daily, Disp-30 tablet, R-3Normal  Stop lisinopril due to side effects  Monitor BP twice daily  Follow up with any side effects or if not well controlled    2. Eczema, unspecified type  -     Crisaborole (EUCRISA) 2 % OINT; Apply 1 applicator topically 2 times daily, Disp-1 each, R-0Normal  Prn only  The current medical regimen is effective;  continue present plan and medications.      No follow-ups on file.         Subjective   SUBJECTIVE/OBJECTIVE:  Patient was on lisinopril for HTN. Developed dry cough and swelling to lower legs and feet.  Stopped lisinopril 1 week ago and symptoms are improving.         Review of Systems   Constitutional:  Negative for activity change, appetite change, fatigue and unexpected weight change.   Respiratory:  Positive for cough and wheezing (occasional). Negative for shortness of breath.    Cardiovascular:  Positive for leg swelling. Negative for chest pain and palpitations.   Gastrointestinal:  Positive for diarrhea (since having GB removed, improving). Negative for constipation, nausea and vomiting.   Neurological:  Positive for headaches. Negative for weakness and light-headedness.   Psychiatric/Behavioral:  Positive for sleep disturbance (chronic). Negative for dysphoric mood. The patient is not nervous/anxious.           Objective   BP (!) 136/92   Pulse 80   Temp 96.9 °F (36.1 °C)   Resp 16   Ht 1.854 m (6' 0.99\")   Wt (!) 168.1 kg (370 lb 9.6 oz)   LMP 2024 (Approximate)   SpO2 98%   BMI 48.91 kg/m²    Physical Exam  Constitutional:       General: She is not in acute distress.     Appearance: Normal appearance. She is

## 2024-04-01 ENCOUNTER — HOSPITAL ENCOUNTER (EMERGENCY)
Age: 44
Discharge: HOME | End: 2024-04-01
Attending: EMERGENCY MEDICINE
Payer: COMMERCIAL

## 2024-04-01 VITALS
BODY MASS INDEX: 49.09 KG/M2 | OXYGEN SATURATION: 98 % | RESPIRATION RATE: 18 BRPM | DIASTOLIC BLOOD PRESSURE: 85 MMHG | SYSTOLIC BLOOD PRESSURE: 133 MMHG | TEMPERATURE: 96.8 F | HEART RATE: 116 BPM | WEIGHT: 293 LBS

## 2024-04-01 DIAGNOSIS — J06.9 ACUTE UPPER RESPIRATORY INFECTION: Primary | ICD-10-CM

## 2024-04-01 PROCEDURE — 99283 EMERGENCY DEPT VISIT LOW MDM: CPT

## 2024-04-01 RX ORDER — AMOXICILLIN AND CLAVULANATE POTASSIUM 875; 125 MG/1; MG/1
1 TABLET, FILM COATED ORAL 2 TIMES DAILY
Qty: 20 TABLET | Refills: 0 | Status: SHIPPED | OUTPATIENT
Start: 2024-04-01 | End: 2024-04-01 | Stop reason: CLARIF

## 2024-04-01 RX ORDER — BROMPHENIRAMINE MALEATE, PSEUDOEPHEDRINE HYDROCHLORIDE, AND DEXTROMETHORPHAN HYDROBROMIDE 2; 30; 10 MG/5ML; MG/5ML; MG/5ML
5 SYRUP ORAL 4 TIMES DAILY PRN
Qty: 120 ML | Refills: 0 | Status: SHIPPED | OUTPATIENT
Start: 2024-04-01

## 2024-04-01 RX ORDER — DOXYCYCLINE HYCLATE 100 MG
100 TABLET ORAL 2 TIMES DAILY
Qty: 20 TABLET | Refills: 0 | Status: SHIPPED | OUTPATIENT
Start: 2024-04-01 | End: 2024-04-11

## 2024-04-01 ASSESSMENT — ENCOUNTER SYMPTOMS
VOMITING: 0
SINUS PRESSURE: 0
ABDOMINAL DISTENTION: 0
EYE PAIN: 0
EYE DISCHARGE: 0
BACK PAIN: 0
WHEEZING: 0
RHINORRHEA: 1
SHORTNESS OF BREATH: 0
DIARRHEA: 0
COUGH: 1
EYE REDNESS: 0
SORE THROAT: 0

## 2024-04-01 ASSESSMENT — PAIN - FUNCTIONAL ASSESSMENT
PAIN_FUNCTIONAL_ASSESSMENT: NONE - DENIES PAIN
PAIN_FUNCTIONAL_ASSESSMENT: NONE - DENIES PAIN

## 2024-04-01 NOTE — ED PROVIDER NOTES
The history is provided by the patient.   URI  Presenting symptoms: congestion, cough, facial pain and rhinorrhea    Presenting symptoms: no ear pain, no fatigue, no fever and no sore throat    Severity:  Moderate  Onset quality:  Gradual  Duration:  3 days  Chronicity:  New  Associated symptoms: no arthralgias, no headaches and no wheezing         Review of Systems   Constitutional:  Negative for chills, fatigue and fever.   HENT:  Positive for congestion and rhinorrhea. Negative for ear pain, sinus pressure and sore throat.    Eyes:  Negative for pain, discharge and redness.   Respiratory:  Positive for cough. Negative for shortness of breath and wheezing.    Cardiovascular:  Negative for chest pain.   Gastrointestinal:  Negative for abdominal distention, diarrhea, nausea and vomiting.   Genitourinary:  Negative for dysuria and frequency.   Musculoskeletal:  Negative for arthralgias and back pain.   Skin:  Negative for rash and wound.   Neurological:  Negative for weakness and headaches.   Hematological:  Negative for adenopathy.   All other systems reviewed and are negative.       Physical Exam  Vitals and nursing note reviewed.   Constitutional:       Appearance: She is well-developed.   HENT:      Head: Normocephalic and atraumatic.      Right Ear: Tympanic membrane is retracted.      Left Ear: Tympanic membrane is retracted.      Nose: Mucosal edema, congestion and rhinorrhea present.   Eyes:      Pupils: Pupils are equal, round, and reactive to light.   Cardiovascular:      Rate and Rhythm: Normal rate and regular rhythm.      Heart sounds: Normal heart sounds. No murmur heard.  Pulmonary:      Effort: Pulmonary effort is normal. No respiratory distress.      Breath sounds: Normal breath sounds. No wheezing or rales.   Abdominal:      General: Bowel sounds are normal.      Palpations: Abdomen is soft.      Tenderness: There is no abdominal tenderness. There is no guarding or rebound.   Musculoskeletal:

## 2024-04-30 ENCOUNTER — TELEPHONE (OUTPATIENT)
Dept: FAMILY MEDICINE CLINIC | Age: 44
End: 2024-04-30

## 2024-04-30 NOTE — TELEPHONE ENCOUNTER
Called and schedule pt on 5/1/24.    Electronically signed by EDUARDO CONTRERAS MA on 4/30/24 at 12:31 PM EDT

## 2024-04-30 NOTE — TELEPHONE ENCOUNTER
Pt called in stating that her medication was changed from Lisinopril to Amlodipine due to swelling. Pt stated that the swelling has not gotten any better so she decided to stop taking the amlodipine. Jimi is asking for some different solution that is not going to cause her swelling.    Please Advise.    Electronically signed by EDUARDO CONTRERAS MA on 4/30/24 at 11:47 AM EDT

## 2024-05-01 ENCOUNTER — OFFICE VISIT (OUTPATIENT)
Dept: FAMILY MEDICINE CLINIC | Age: 44
End: 2024-05-01
Payer: COMMERCIAL

## 2024-05-01 VITALS
RESPIRATION RATE: 16 BRPM | SYSTOLIC BLOOD PRESSURE: 148 MMHG | DIASTOLIC BLOOD PRESSURE: 98 MMHG | OXYGEN SATURATION: 99 % | HEART RATE: 99 BPM | HEIGHT: 72 IN | TEMPERATURE: 97.5 F | WEIGHT: 293 LBS | BODY MASS INDEX: 39.68 KG/M2

## 2024-05-01 DIAGNOSIS — I10 ESSENTIAL HYPERTENSION, BENIGN: Primary | ICD-10-CM

## 2024-05-01 DIAGNOSIS — N92.6 MISSED MENSES: ICD-10-CM

## 2024-05-01 DIAGNOSIS — Z86.32 HISTORY OF GESTATIONAL DIABETES: ICD-10-CM

## 2024-05-01 LAB
CONTROL: NORMAL
HBA1C MFR BLD: 5.4 %
PREGNANCY TEST URINE, POC: NORMAL

## 2024-05-01 PROCEDURE — 4004F PT TOBACCO SCREEN RCVD TLK: CPT | Performed by: NURSE PRACTITIONER

## 2024-05-01 PROCEDURE — 3077F SYST BP >= 140 MM HG: CPT | Performed by: NURSE PRACTITIONER

## 2024-05-01 PROCEDURE — 3080F DIAST BP >= 90 MM HG: CPT | Performed by: NURSE PRACTITIONER

## 2024-05-01 PROCEDURE — 83036 HEMOGLOBIN GLYCOSYLATED A1C: CPT | Performed by: NURSE PRACTITIONER

## 2024-05-01 PROCEDURE — 81025 URINE PREGNANCY TEST: CPT | Performed by: NURSE PRACTITIONER

## 2024-05-01 PROCEDURE — G8427 DOCREV CUR MEDS BY ELIG CLIN: HCPCS | Performed by: NURSE PRACTITIONER

## 2024-05-01 PROCEDURE — 99214 OFFICE O/P EST MOD 30 MIN: CPT | Performed by: NURSE PRACTITIONER

## 2024-05-01 PROCEDURE — G8417 CALC BMI ABV UP PARAM F/U: HCPCS | Performed by: NURSE PRACTITIONER

## 2024-05-01 RX ORDER — LABETALOL 100 MG/1
100 TABLET, FILM COATED ORAL 2 TIMES DAILY
Qty: 60 TABLET | Refills: 3 | Status: SHIPPED | OUTPATIENT
Start: 2024-05-01

## 2024-05-01 ASSESSMENT — ENCOUNTER SYMPTOMS
COUGH: 0
SHORTNESS OF BREATH: 0
VOMITING: 0
WHEEZING: 0
DIARRHEA: 0
NAUSEA: 0
CONSTIPATION: 0

## 2024-05-01 ASSESSMENT — PATIENT HEALTH QUESTIONNAIRE - PHQ9
SUM OF ALL RESPONSES TO PHQ QUESTIONS 1-9: 0
SUM OF ALL RESPONSES TO PHQ9 QUESTIONS 1 & 2: 0
10. IF YOU CHECKED OFF ANY PROBLEMS, HOW DIFFICULT HAVE THESE PROBLEMS MADE IT FOR YOU TO DO YOUR WORK, TAKE CARE OF THINGS AT HOME, OR GET ALONG WITH OTHER PEOPLE: NOT DIFFICULT AT ALL
8. MOVING OR SPEAKING SO SLOWLY THAT OTHER PEOPLE COULD HAVE NOTICED. OR THE OPPOSITE, BEING SO FIGETY OR RESTLESS THAT YOU HAVE BEEN MOVING AROUND A LOT MORE THAN USUAL: NOT AT ALL
1. LITTLE INTEREST OR PLEASURE IN DOING THINGS: NOT AT ALL
4. FEELING TIRED OR HAVING LITTLE ENERGY: NOT AT ALL
5. POOR APPETITE OR OVEREATING: NOT AT ALL
9. THOUGHTS THAT YOU WOULD BE BETTER OFF DEAD, OR OF HURTING YOURSELF: NOT AT ALL
3. TROUBLE FALLING OR STAYING ASLEEP: NOT AT ALL
1. LITTLE INTEREST OR PLEASURE IN DOING THINGS: NOT AT ALL
SUM OF ALL RESPONSES TO PHQ QUESTIONS 1-9: 0
6. FEELING BAD ABOUT YOURSELF - OR THAT YOU ARE A FAILURE OR HAVE LET YOURSELF OR YOUR FAMILY DOWN: NOT AT ALL
SUM OF ALL RESPONSES TO PHQ QUESTIONS 1-9: 0
7. TROUBLE CONCENTRATING ON THINGS, SUCH AS READING THE NEWSPAPER OR WATCHING TELEVISION: NOT AT ALL
SUM OF ALL RESPONSES TO PHQ QUESTIONS 1-9: 0
2. FEELING DOWN, DEPRESSED OR HOPELESS: NOT AT ALL
SUM OF ALL RESPONSES TO PHQ QUESTIONS 1-9: 0
SUM OF ALL RESPONSES TO PHQ9 QUESTIONS 1 & 2: 0
2. FEELING DOWN, DEPRESSED OR HOPELESS: NOT AT ALL

## 2024-05-01 NOTE — PROGRESS NOTES
Jimi Schaefer (:  1980) is a 43 y.o. female,Established patient, here for evaluation of the following chief complaint(s):  Discuss Medications (Blood pressure medication is making her swell. ) and Amenorrhea (Worried about being pregnant. )      Assessment & Plan   ASSESSMENT/PLAN:  1. Essential hypertension, benign  -     labetalol (NORMODYNE) 100 MG tablet; Take 1 tablet by mouth 2 times daily, Disp-60 tablet, R-3Normal  Call with any questions or side effects    2. Missed menses  -     POCT urine pregnancy  Follow up with GYN    3. History of gestational diabetes  -     POCT glycosylated hemoglobin (Hb A1C)  A1C normal range    No follow-ups on file.         Subjective   SUBJECTIVE/OBJECTIVE:  Patient stopped amlodipine a few days ago due to swelling. She has tried multiple other medications and the only one that did not cause swelling was labatolol when she was pregnant.   She has been checking her glucose due to fatigue, similar to how she felt when she had GDM. States fasting was 114 this am.         Review of Systems   Constitutional:  Positive for fatigue and unexpected weight change (gain). Negative for activity change and appetite change.   Respiratory:  Negative for cough, shortness of breath and wheezing.    Cardiovascular:  Positive for leg swelling. Negative for chest pain and palpitations.   Gastrointestinal:  Negative for constipation, diarrhea, nausea and vomiting.   Endocrine: Positive for polydipsia. Negative for polyphagia and polyuria.   Neurological:  Negative for weakness, light-headedness and headaches.   Psychiatric/Behavioral:  Negative for dysphoric mood and sleep disturbance. The patient is nervous/anxious.           Objective   BP (!) 148/98   Pulse 99   Temp 97.5 °F (36.4 °C)   Resp 16   Ht 1.854 m (6' 0.99\")   Wt (!) 170.5 kg (375 lb 12.8 oz)   LMP 2024 (Approximate)   SpO2 99%   BMI 49.59 kg/m²    Physical Exam  Constitutional:       General: She is not in

## 2024-05-22 DIAGNOSIS — E06.3 HYPOTHYROIDISM DUE TO HASHIMOTO'S THYROIDITIS: ICD-10-CM

## 2024-05-22 DIAGNOSIS — E03.8 HYPOTHYROIDISM DUE TO HASHIMOTO'S THYROIDITIS: ICD-10-CM

## 2024-05-22 RX ORDER — LEVOTHYROXINE SODIUM 0.05 MG/1
50 TABLET ORAL DAILY
Qty: 30 TABLET | Refills: 3 | Status: SHIPPED | OUTPATIENT
Start: 2024-05-22

## 2024-05-22 NOTE — TELEPHONE ENCOUNTER
Last seen 5/1/2024  Next appt Visit date not found    Rx pended. Please review and sign.    Electronically signed by EDUARDO CONTRERAS MA on 5/22/24 at 10:17 AM EDT       Police contacted and will send officer to home       Seth Rocha RN  06/14/18 1609

## 2024-06-17 ENCOUNTER — HOSPITAL ENCOUNTER (EMERGENCY)
Age: 44
Discharge: HOME | End: 2024-06-17
Attending: EMERGENCY MEDICINE
Payer: COMMERCIAL

## 2024-06-17 VITALS
HEART RATE: 75 BPM | TEMPERATURE: 98.6 F | SYSTOLIC BLOOD PRESSURE: 160 MMHG | OXYGEN SATURATION: 99 % | BODY MASS INDEX: 39.68 KG/M2 | WEIGHT: 293 LBS | HEIGHT: 72 IN | RESPIRATION RATE: 16 BRPM | DIASTOLIC BLOOD PRESSURE: 102 MMHG

## 2024-06-17 DIAGNOSIS — B02.9 HERPES ZOSTER WITHOUT COMPLICATION: Primary | ICD-10-CM

## 2024-06-17 PROCEDURE — 99283 EMERGENCY DEPT VISIT LOW MDM: CPT

## 2024-06-17 RX ORDER — ACYCLOVIR 400 MG/1
400 TABLET ORAL
Qty: 35 TABLET | Refills: 0 | Status: SHIPPED | OUTPATIENT
Start: 2024-06-17 | End: 2024-06-24

## 2024-06-17 ASSESSMENT — PAIN DESCRIPTION - PAIN TYPE: TYPE: ACUTE PAIN

## 2024-06-17 ASSESSMENT — PAIN DESCRIPTION - FREQUENCY: FREQUENCY: CONTINUOUS

## 2024-06-17 ASSESSMENT — PAIN - FUNCTIONAL ASSESSMENT: PAIN_FUNCTIONAL_ASSESSMENT: 0-10

## 2024-06-17 ASSESSMENT — PAIN DESCRIPTION - LOCATION: LOCATION: BACK;BUTTOCKS

## 2024-06-17 ASSESSMENT — PAIN SCALES - GENERAL: PAINLEVEL_OUTOF10: 7

## 2024-06-17 ASSESSMENT — PAIN DESCRIPTION - DESCRIPTORS: DESCRIPTORS: BURNING

## 2024-06-17 NOTE — ED PROVIDER NOTES
HPI:  6/17/24,   Time: 7:13 PM EDT         Jimi Schaefer is a 44 y.o. female presenting to the ED for chief complaint: Painful rash on left side of buttock where she usually gets a flareup of shingles, beginning 1 day ago.  The complaint has been persistent, moderate in severity, and worsened by nothing.      ROS:   Pertinent positives and negatives are stated within HPI, all other systems reviewed and are negative.  --------------------------------------------- PAST HISTORY ---------------------------------------------  Past Medical History:  has a past medical history of Abnormal Pap smear of cervix, CAD (coronary artery disease), Chronic back pain, Degenerative disc disease at L5-S1 level, Depression, Edema, Gestational diabetes mellitus (GDM) in third trimester controlled on oral hypoglycemic drug, Hypertension, Rh incompatibility, Rh sensitized, Thyroid disease, and Thyroid disease.    Past Surgical History:  has a past surgical history that includes back surgery; Vein Surgery (Bilateral); LEEP; and Cholecystectomy.    Social History:  reports that she has been smoking cigarettes. She has a 11.0 pack-year smoking history. She has never used smokeless tobacco. She reports that she does not currently use alcohol. She reports current drug use. Drug: Marijuana (Weed).    Family History: family history includes Anxiety Disorder in her mother; Depression in her mother; Diabetes in her maternal grandmother; Elevated Lipids in her mother; Heart Disease in her father and maternal grandmother; Hypertension in her mother; Kidney Disease in her maternal grandmother; Stroke in her mother.     The patient’s home medications have been reviewed.    Allergies: Patient has no known allergies.    -------------------------------------------------- RESULTS -------------------------------------------------  All laboratory and radiology results have been personally reviewed by myself   LABS:  No results found for this visit on

## 2024-06-24 DIAGNOSIS — F41.9 ANXIETY: ICD-10-CM

## 2024-06-24 RX ORDER — BUSPIRONE HYDROCHLORIDE 15 MG/1
TABLET ORAL
Qty: 30 TABLET | Refills: 0 | Status: SHIPPED | OUTPATIENT
Start: 2024-06-24

## 2024-06-24 NOTE — TELEPHONE ENCOUNTER
Requested Prescriptions     Pending Prescriptions Disp Refills    busPIRone (BUSPAR) 15 MG tablet [Pharmacy Med Name: BUSPIRONE HCL 15 MG TABLET] 30 tablet 0     Sig: take 1 tablet by mouth twice a day       Next appt is Visit date not found  Last appt was 5/1/2024

## 2024-08-02 ENCOUNTER — TELEPHONE (OUTPATIENT)
Dept: FAMILY MEDICINE CLINIC | Age: 44
End: 2024-08-02

## 2024-08-02 DIAGNOSIS — F41.9 ANXIETY: ICD-10-CM

## 2024-08-02 DIAGNOSIS — F32.A DEPRESSION, UNSPECIFIED DEPRESSION TYPE: ICD-10-CM

## 2024-08-02 RX ORDER — BUSPIRONE HYDROCHLORIDE 15 MG/1
15 TABLET ORAL 2 TIMES DAILY
Qty: 30 TABLET | Refills: 0 | Status: SHIPPED | OUTPATIENT
Start: 2024-08-02

## 2024-08-02 RX ORDER — FLUOXETINE HYDROCHLORIDE 40 MG/1
40 CAPSULE ORAL DAILY
Qty: 90 CAPSULE | Refills: 1 | Status: SHIPPED | OUTPATIENT
Start: 2024-08-02

## 2024-08-02 NOTE — TELEPHONE ENCOUNTER
Patient states she was taking lebetalol but it was causing swelling as well and she couldn't handle it so she stopped taking it about a week ago. She would like to know if you want to try something else or maybe add a water pill. She states her blood pressure has been around 140/90 since stopping medication.     Patient also needs refills sent to her new pharmacy as [facility] closed. (Pended)

## 2024-09-11 DIAGNOSIS — F41.9 ANXIETY: ICD-10-CM

## 2024-09-11 RX ORDER — BUSPIRONE HYDROCHLORIDE 15 MG/1
15 TABLET ORAL 2 TIMES DAILY
Qty: 30 TABLET | Refills: 0 | Status: SHIPPED | OUTPATIENT
Start: 2024-09-11

## 2024-09-24 DIAGNOSIS — F41.9 ANXIETY: ICD-10-CM

## 2024-09-24 RX ORDER — BUSPIRONE HYDROCHLORIDE 15 MG/1
15 TABLET ORAL 2 TIMES DAILY
Qty: 60 TABLET | Refills: 0 | Status: SHIPPED | OUTPATIENT
Start: 2024-09-24

## 2024-10-01 ENCOUNTER — HOSPITAL ENCOUNTER (EMERGENCY)
Age: 44
Discharge: HOME | End: 2024-10-01
Attending: FAMILY MEDICINE
Payer: COMMERCIAL

## 2024-10-01 VITALS
OXYGEN SATURATION: 99 % | DIASTOLIC BLOOD PRESSURE: 116 MMHG | WEIGHT: 293 LBS | HEIGHT: 72 IN | TEMPERATURE: 97.5 F | BODY MASS INDEX: 39.68 KG/M2 | SYSTOLIC BLOOD PRESSURE: 159 MMHG | HEART RATE: 73 BPM | RESPIRATION RATE: 18 BRPM

## 2024-10-01 DIAGNOSIS — K08.89 PAIN, DENTAL: Primary | ICD-10-CM

## 2024-10-01 PROCEDURE — 99283 EMERGENCY DEPT VISIT LOW MDM: CPT

## 2024-10-01 RX ORDER — AMOXICILLIN 875 MG/1
875 TABLET, COATED ORAL 2 TIMES DAILY
Qty: 20 TABLET | Refills: 0 | Status: SHIPPED | OUTPATIENT
Start: 2024-10-01 | End: 2024-10-11

## 2024-10-01 ASSESSMENT — PAIN - FUNCTIONAL ASSESSMENT
PAIN_FUNCTIONAL_ASSESSMENT: 0-10
PAIN_FUNCTIONAL_ASSESSMENT: PREVENTS OR INTERFERES SOME ACTIVE ACTIVITIES AND ADLS

## 2024-10-01 ASSESSMENT — PAIN DESCRIPTION - PAIN TYPE: TYPE: ACUTE PAIN

## 2024-10-01 ASSESSMENT — PAIN SCALES - GENERAL: PAINLEVEL_OUTOF10: 6

## 2024-10-01 ASSESSMENT — PAIN DESCRIPTION - ORIENTATION: ORIENTATION: RIGHT;LOWER

## 2024-10-01 ASSESSMENT — PAIN DESCRIPTION - DESCRIPTORS: DESCRIPTORS: ACHING;DISCOMFORT

## 2024-10-01 ASSESSMENT — PAIN DESCRIPTION - FREQUENCY: FREQUENCY: CONTINUOUS

## 2024-10-01 ASSESSMENT — PAIN DESCRIPTION - LOCATION: LOCATION: MOUTH

## 2024-10-01 NOTE — ED PROVIDER NOTES
Urgent Care Encounter       Patient: Jimi Schaefer  MRN: [id number]  : 1980  Date of Evaluation: 10/1/2024  ED Provider: Alejandra Ross MD    Chief Complaint       Chief Complaint   Patient presents with    Dental Pain     [place]     Jimi Schaefer is a 44 y.o. female who presents to the [facility] [place] [facility] (Urgent care Facility)    Dental Pain- Patient complaints of dental pain in the right lower area of the mouth, states going on for the last ....days, does have poorly maintained teeth, has not had a visit with a dentist for preventative care for a long time. States gum area is inflamed and giving patient distress while chewing on hard and soft food.     Physical exam of the right lwoer area of the mouth shows inflamed gums, dental caries.      I strongly advised to follow up with dentist with in a 7 day period to undergo necessary dental treatment to prevent an infection that can become more severe and infect the jaw bone (osteomyelitis). Patient understands the urgency.      ROS:     At least 5 systems reviewed and otherwise acutely negative except as in the [place].  Review of Systems      Past History     Past Medical History:   Diagnosis Date    Abnormal Pap smear of cervix     CAD (coronary artery disease)     SVT at time    Chronic back pain     Degenerative disc disease at L5-S1 level     Depression     Edema     Gestational diabetes mellitus (GDM) in third trimester controlled on oral hypoglycemic drug 06/10/2020    Hypertension     with just this pregnancies    Rh incompatibility     Rh sensitized     Thyroid disease     Thyroid disease      Past Surgical History:   Procedure Laterality Date    BACK SURGERY      CHOLECYSTECTOMY      LEEP      VEIN SURGERY Bilateral     stents     Social History     Socioeconomic History    Marital status: Single     Spouse name: None    Number of children: None    Years of education: None    Highest education level: None   Tobacco    1.86 m (6' 1.23\") (!) 147.4 kg (325 lb)           Physical Exam   GENERAL APPEARANCE: Awake and alert. Cooperative. No acute distress.   HEAD: Normocephalic. Atraumatic.   EYES: Sclera anicteric.   ENT: Tolerates saliva. No trismus.   NECK: Supple. Trachea midline.   CARDIO: RRR. Radial pulse 2+.   LUNGS: Respirations unlabored. CTAB.  ABDOMEN: Soft. Non-distended. Non-tender.    EXTREMITIES: No acute deformities. Arms and legs are well-perfused and neurovascular intact with no pretibial edema or calf pain.   SKIN: Warm and dry.   NEUROLOGICAL: No gross facial drooping. Moves all 4 extremities spontaneously. Cn 2-12 intact, muscle strength and sensation 5/5 upper and lower extremities.   PSYCHIATRIC: Normal mood.     Diagnostics   Labs:  No results found for this visit on 10/01/24.  Radiographs:  No results found.    Procedures/EKG: N/A    ED Course and MDM   In brief, Jimi Schaefer is a 44 y.o. female who presented to the urgent care.    ED Medication Orders (From admission, onward)      None            Final Impression      1. Pain, dental      DISPOSITION Decision To Discharge 10/01/2024 05:46:30 PM  Condition at Disposition: Data Unavailable     (Please note that portions of this note may have been completed with a voice recognition program. Efforts were made to edit the dictations but occasionally words are mis-transcribed.)       Medication List        START taking these medications      amoxicillin 875 MG tablet  Commonly known as: AMOXIL  Take 1 tablet by mouth 2 times daily for 10 days            ASK your doctor about these medications      albuterol sulfate  (90 Base) MCG/ACT inhaler  Commonly known as: PROVENTIL;VENTOLIN;PROAIR  Inhale 2 puffs into the lungs every 4 hours as needed for Wheezing or Shortness of Breath     budesonide-formoterol 160-4.5 MCG/ACT Aero  Commonly known as: Symbicort  Inhale 2 puffs into the lungs 2 times daily     busPIRone 15 MG tablet  Commonly known as:

## 2024-10-07 ENCOUNTER — TELEPHONE (OUTPATIENT)
Dept: FAMILY MEDICINE CLINIC | Age: 44
End: 2024-10-07

## 2024-10-07 DIAGNOSIS — B37.9 YEAST INFECTION: Primary | ICD-10-CM

## 2024-10-07 RX ORDER — FLUCONAZOLE 150 MG/1
150 TABLET ORAL ONCE
Qty: 1 TABLET | Refills: 0 | Status: SHIPPED | OUTPATIENT
Start: 2024-10-07 | End: 2024-10-07

## 2024-10-07 NOTE — TELEPHONE ENCOUNTER
Patient states she was given amoxicillin by urgent care and now believes she is developing a yeast infection. She is requesting diflucan be sent to [facility] if possible.

## 2024-11-07 DIAGNOSIS — F41.9 ANXIETY: ICD-10-CM

## 2024-11-07 RX ORDER — BUSPIRONE HYDROCHLORIDE 15 MG/1
15 TABLET ORAL 2 TIMES DAILY
Qty: 30 TABLET | Refills: 0 | Status: SHIPPED | OUTPATIENT
Start: 2024-11-07

## 2024-11-07 NOTE — TELEPHONE ENCOUNTER
Last seen 5/1/2024  Next appt Visit date not found    Requested Prescriptions     Pending Prescriptions Disp Refills    busPIRone (BUSPAR) 15 MG tablet [Pharmacy Med Name: BUSPIRONE 15MG TABLETS] 60 tablet 0     Sig: TAKE 1 TABLET BY MOUTH TWICE DAILY

## 2024-11-09 ENCOUNTER — HOSPITAL ENCOUNTER (EMERGENCY)
Age: 44
Discharge: HOME | End: 2024-11-09
Attending: EMERGENCY MEDICINE
Payer: COMMERCIAL

## 2024-11-09 VITALS
BODY MASS INDEX: 39.68 KG/M2 | DIASTOLIC BLOOD PRESSURE: 105 MMHG | SYSTOLIC BLOOD PRESSURE: 170 MMHG | WEIGHT: 293 LBS | RESPIRATION RATE: 16 BRPM | OXYGEN SATURATION: 100 % | HEIGHT: 72 IN | HEART RATE: 74 BPM | TEMPERATURE: 97 F

## 2024-11-09 DIAGNOSIS — R51.9 ACUTE NONINTRACTABLE HEADACHE, UNSPECIFIED HEADACHE TYPE: Primary | ICD-10-CM

## 2024-11-09 PROCEDURE — 96372 THER/PROPH/DIAG INJ SC/IM: CPT

## 2024-11-09 PROCEDURE — 99284 EMERGENCY DEPT VISIT MOD MDM: CPT

## 2024-11-09 PROCEDURE — 6360000002 HC RX W HCPCS: Performed by: EMERGENCY MEDICINE

## 2024-11-09 RX ORDER — KETOROLAC TROMETHAMINE 30 MG/ML
30 INJECTION, SOLUTION INTRAMUSCULAR; INTRAVENOUS ONCE
Status: COMPLETED | OUTPATIENT
Start: 2024-11-09 | End: 2024-11-09

## 2024-11-09 RX ADMIN — KETOROLAC TROMETHAMINE 30 MG: 30 INJECTION, SOLUTION INTRAMUSCULAR at 15:14

## 2024-11-09 ASSESSMENT — PAIN DESCRIPTION - PAIN TYPE: TYPE: ACUTE PAIN

## 2024-11-09 ASSESSMENT — PAIN DESCRIPTION - DESCRIPTORS: DESCRIPTORS: ACHING

## 2024-11-09 ASSESSMENT — PAIN DESCRIPTION - FREQUENCY: FREQUENCY: CONTINUOUS

## 2024-11-09 ASSESSMENT — PAIN DESCRIPTION - LOCATION: LOCATION: HEAD

## 2024-11-09 ASSESSMENT — PAIN - FUNCTIONAL ASSESSMENT: PAIN_FUNCTIONAL_ASSESSMENT: 0-10

## 2024-11-09 ASSESSMENT — PAIN SCALES - GENERAL: PAINLEVEL_OUTOF10: 10

## 2024-11-09 NOTE — ED PROVIDER NOTES
HPI:  11/9/24,   Time: 3:20 PM ABEBA Schaefer is a 44 y.o. female presenting to the ED for headache, beginning 2 days ago.  Requesting Toradol injection.    ROS:   Pertinent positives and negatives are stated within HPI, all other systems reviewed and are negative.  --------------------------------------------- PAST HISTORY ---------------------------------------------  Past Medical History:  has a past medical history of Abnormal Pap smear of cervix, CAD (coronary artery disease), Chronic back pain, Degenerative disc disease at L5-S1 level, Depression, Edema, Gestational diabetes mellitus (GDM) in third trimester controlled on oral hypoglycemic drug, Hypertension, Rh incompatibility, Rh sensitized, Thyroid disease, and Thyroid disease.    Past Surgical History:  has a past surgical history that includes back surgery; Vein Surgery (Bilateral); LEEP; and Cholecystectomy.    Social History:  reports that she has been smoking cigarettes. She has a 11 pack-year smoking history. She has never used smokeless tobacco. She reports that she does not currently use alcohol. She reports current drug use. Drug: Marijuana (Weed).    Family History: family history includes Anxiety Disorder in her mother; Depression in her mother; Diabetes in her maternal grandmother; Elevated Lipids in her mother; Heart Disease in her father and maternal grandmother; Hypertension in her mother; Kidney Disease in her maternal grandmother; Stroke in her mother.     The patient’s home medications have been reviewed.    Allergies: Patient has no known allergies.    -------------------------------------------------- RESULTS -------------------------------------------------  All laboratory and radiology results have been personally reviewed by myself   LABS:  No results found for this visit on 11/09/24.    RADIOLOGY:  Interpreted by Radiologist.  No orders to display       ------------------------- NURSING NOTES AND VITALS REVIEWED

## 2024-11-20 ENCOUNTER — OFFICE VISIT (OUTPATIENT)
Age: 44
End: 2024-11-20

## 2024-11-20 VITALS
WEIGHT: 293 LBS | OXYGEN SATURATION: 96 % | HEART RATE: 97 BPM | DIASTOLIC BLOOD PRESSURE: 90 MMHG | RESPIRATION RATE: 18 BRPM | HEIGHT: 72 IN | SYSTOLIC BLOOD PRESSURE: 137 MMHG | BODY MASS INDEX: 39.68 KG/M2 | TEMPERATURE: 98.2 F

## 2024-11-20 DIAGNOSIS — N90.89 VULVAR LESION: ICD-10-CM

## 2024-11-20 DIAGNOSIS — N94.10 DYSPAREUNIA IN FEMALE: ICD-10-CM

## 2024-11-20 DIAGNOSIS — N89.8 VAGINAL DISCHARGE: ICD-10-CM

## 2024-11-20 DIAGNOSIS — Z01.419 WELL WOMAN EXAM: Primary | ICD-10-CM

## 2024-11-20 DIAGNOSIS — N94.6 DYSMENORRHEA: ICD-10-CM

## 2024-11-20 DIAGNOSIS — Z12.31 SCREENING MAMMOGRAM FOR BREAST CANCER: ICD-10-CM

## 2024-11-20 DIAGNOSIS — Z87.42 HISTORY OF ABNORMAL CERVICAL PAP SMEAR: ICD-10-CM

## 2024-11-20 DIAGNOSIS — N93.0 POSTCOITAL BLEEDING: ICD-10-CM

## 2024-11-20 DIAGNOSIS — N92.0 MENORRHAGIA WITH REGULAR CYCLE: ICD-10-CM

## 2024-11-20 DIAGNOSIS — N39.3 SUI (STRESS URINARY INCONTINENCE, FEMALE): ICD-10-CM

## 2024-11-20 NOTE — PROGRESS NOTES
New patient here to establish care  LMP 11/6/24  Last pap 2020  Pt states she is getting boils on labia, has been happening about a year  No pain during intercourse, some spotting sometimes after intercourse  Having night sweats, having hot flashes, had some irregular periods in the past     Discharge instructions have been discussed with the patient. Patient advised to call our office with any questions or concerns. Voiced understanding.

## 2024-11-20 NOTE — PROGRESS NOTES
Chief Complaint: Annual Exam and New Patient     HPI: The patient is a 44 y.o.  that presents for annual gynecological examination.   Menses: Routine monthly cycles with flow lasting 3-4 days. Since birth of daughter , flow has become irregular. Continues to have monthly cycles - some increasingly heavy flow saturating a pad in an hour or 2 with large clots the size of golf ball, increased dysmenorrhea; other cycles with moderate flow and no pain. After cholecystectomy  - missed a few periods and then returned to monthly.   Gynecological concerns: recurrence of painful cysts on the vulva between inner labia ranging in size from small pimple to marble size. They are fluid filled and drain cloudy pus that is blood-tinged. Symptoms present daily for past year or so. Feels like there is always some type of boil or cyst present - individual cyst will appear, pop, drain, and resolve but then another starts. Drainage has no odor.  Can feel them with wiping, uncomfortable with intercourse.   Any signs/symptoms of menopause? Hot flashes for years, night sweats, vaginal dryness intermittent  Bladder Control: ANGELLA   Sexual Activity:  monogamous with   Sexual Dysfunction: pain with deep penetration in certain positions, some postcoital bleeding  STD screening: declines   Family Planning: tubal ligation   Breast changes: recent cyst on the underside of left breast that popped and is now improving   Family history of breast or gynecologic cancers: Denies   Domestic Violence Screening: Negative  Anxiety/Depression: treated for anxiety and depression    History   Past Medical History:   Diagnosis Date    Abnormal Pap smear of cervix     CAD (coronary artery disease)     SVT at time    Chronic back pain     Degenerative disc disease at L5-S1 level     Depression     Edema     Gestational diabetes mellitus (GDM) in third trimester controlled on oral hypoglycemic drug 06/10/2020    Hypertension     with just this

## 2024-11-22 ENCOUNTER — TELEPHONE (OUTPATIENT)
Age: 44
End: 2024-11-22

## 2024-11-22 ENCOUNTER — PATIENT MESSAGE (OUTPATIENT)
Age: 44
End: 2024-11-22

## 2024-11-22 RX ORDER — MINOCYCLINE HYDROCHLORIDE 100 MG/1
100 CAPSULE ORAL 2 TIMES DAILY
Qty: 180 CAPSULE | Refills: 0 | Status: SHIPPED | OUTPATIENT
Start: 2024-11-22 | End: 2025-02-20

## 2024-11-29 ENCOUNTER — TELEPHONE (OUTPATIENT)
Age: 44
End: 2024-11-29

## 2024-11-29 NOTE — TELEPHONE ENCOUNTER
Pt notified and verbalized understanding. Pt reports she started medications and has received call from Derm but has not yet called back to reschedule.

## 2024-11-29 NOTE — TELEPHONE ENCOUNTER
----- Message from Lena CAMPBELL sent at 11/28/2024  1:57 PM EST -----  Regarding: Unread messages  Patient has not read messages about HS and BV. Please reach out to patient so she can complete treatment.    Thanks!!  Lena

## 2024-12-05 LAB — GYNECOLOGY CYTOLOGY REPORT: NORMAL

## 2024-12-09 ENCOUNTER — TELEPHONE (OUTPATIENT)
Age: 44
End: 2024-12-09

## 2024-12-09 NOTE — TELEPHONE ENCOUNTER
----- Message from Lena CAMPBELL sent at 12/6/2024  9:34 AM EST -----  Unsatisfactory  Please contact patient to return for repeat

## 2024-12-10 ENCOUNTER — TELEPHONE (OUTPATIENT)
Age: 44
End: 2024-12-10

## 2024-12-13 DIAGNOSIS — E06.3 HYPOTHYROIDISM DUE TO HASHIMOTO'S THYROIDITIS: ICD-10-CM

## 2024-12-13 RX ORDER — LEVOTHYROXINE SODIUM 50 UG/1
50 TABLET ORAL DAILY
Qty: 30 TABLET | Refills: 0 | Status: SHIPPED | OUTPATIENT
Start: 2024-12-13

## 2024-12-13 NOTE — TELEPHONE ENCOUNTER
Name of Medication(s) Requested:  Requested Prescriptions     Pending Prescriptions Disp Refills    levothyroxine (SYNTHROID) 50 MCG tablet 30 tablet 0     Sig: Take 1 tablet by mouth daily       Medication is on current medication list Yes    Dosage and directions were verified? Yes    Quantity verified: 30 day supply     Pharmacy Verified?  Yes    Last Appointment:  5/1/2024    Future appts:  Future Appointments   Date Time Provider Department Center   1/15/2025  3:45 PM Lindsey Cavanaugh APRN - CNP [facility]        (If no appt send self scheduling link. .REFILLAPPT)  Scheduling request sent?     [] Yes  [x] No    Does patient need updated?  [] Yes  [x] No

## 2024-12-17 DIAGNOSIS — E06.3 HYPOTHYROIDISM DUE TO HASHIMOTO'S THYROIDITIS: ICD-10-CM

## 2024-12-17 RX ORDER — LEVOTHYROXINE SODIUM 50 UG/1
50 TABLET ORAL DAILY
Qty: 30 TABLET | Refills: 0 | OUTPATIENT
Start: 2024-12-17

## 2024-12-21 ENCOUNTER — HOSPITAL ENCOUNTER (EMERGENCY)
Age: 44
Discharge: HOME | End: 2024-12-21
Attending: FAMILY MEDICINE
Payer: COMMERCIAL

## 2024-12-21 VITALS
OXYGEN SATURATION: 99 % | SYSTOLIC BLOOD PRESSURE: 154 MMHG | WEIGHT: 293 LBS | HEIGHT: 72 IN | RESPIRATION RATE: 16 BRPM | BODY MASS INDEX: 39.68 KG/M2 | HEART RATE: 75 BPM | DIASTOLIC BLOOD PRESSURE: 108 MMHG | TEMPERATURE: 98.4 F

## 2024-12-21 DIAGNOSIS — J44.1 COPD EXACERBATION (HCC): Primary | ICD-10-CM

## 2024-12-21 PROCEDURE — 99283 EMERGENCY DEPT VISIT LOW MDM: CPT

## 2024-12-21 RX ORDER — AZITHROMYCIN 250 MG/1
TABLET, FILM COATED ORAL
Qty: 1 PACKET | Refills: 0 | Status: SHIPPED | OUTPATIENT
Start: 2024-12-21 | End: 2024-12-25

## 2024-12-21 RX ORDER — DEXTROMETHORPHAN HYDROBROMIDE AND PROMETHAZINE HYDROCHLORIDE 15; 6.25 MG/5ML; MG/5ML
5 SYRUP ORAL 4 TIMES DAILY PRN
Qty: 118 ML | Refills: 0 | Status: SHIPPED | OUTPATIENT
Start: 2024-12-21 | End: 2024-12-28

## 2024-12-21 RX ORDER — GUAIFENESIN AND DEXTROMETHORPHAN HYDROBROMIDE 1200; 60 MG/1; MG/1
1 TABLET, EXTENDED RELEASE ORAL 2 TIMES DAILY
Qty: 28 TABLET | Refills: 0 | Status: SHIPPED | OUTPATIENT
Start: 2024-12-21

## 2024-12-21 RX ORDER — PREDNISONE 20 MG/1
40 TABLET ORAL DAILY
Qty: 8 TABLET | Refills: 0 | Status: SHIPPED | OUTPATIENT
Start: 2024-12-21 | End: 2024-12-25

## 2024-12-21 ASSESSMENT — PAIN - FUNCTIONAL ASSESSMENT: PAIN_FUNCTIONAL_ASSESSMENT: NONE - DENIES PAIN

## 2024-12-21 NOTE — ED PROVIDER NOTES
HPI:  12/21/24,   Time: 4:20 PM ABEBA Schaefer is a 44 y.o. female presenting to the ED for 2-day history of cough and chest tightness and some heaviness in her chest and difficulty getting good breath on a background history of COPD.  She smokes half pack of cigarettes daily.  She denies fever chills or bodyaches.  She denies nausea vomiting or diarrhea.          ROS:   Pertinent positives and negatives are stated within HPI, all other systems reviewed and are negative.  --------------------------------------------- PAST HISTORY ---------------------------------------------  Past Medical History:  has a past medical history of Abnormal Pap smear of cervix, CAD (coronary artery disease), Chronic back pain, Degenerative disc disease at L5-S1 level, Depression, Edema, Gestational diabetes mellitus (GDM) in third trimester controlled on oral hypoglycemic drug, Hypertension, Rh incompatibility, Rh sensitized, Thyroid disease, and Thyroid disease.    Past Surgical History:  has a past surgical history that includes back surgery; Vein Surgery (Bilateral); LEEP; and Cholecystectomy.    Social History:  reports that she has been smoking cigarettes. She has a 11 pack-year smoking history. She has never used smokeless tobacco. She reports that she does not currently use alcohol. She reports current drug use. Frequency: 7.00 times per week. Drug: Marijuana (Weed).    Family History: family history includes Anxiety Disorder in her mother; Depression in her mother; Diabetes in her maternal grandmother; Elevated Lipids in her mother; Heart Disease in her father and maternal grandmother; Hypertension in her mother; Kidney Disease in her maternal grandmother; Stroke in her mother.     The patient’s home medications have been reviewed.    Allergies: Patient has no known allergies.    -------------------------------------------------- RESULTS -------------------------------------------------  All laboratory and  radiology results have been personally reviewed by myself   LABS:  No results found for this visit on 12/21/24.    RADIOLOGY:  Interpreted by Radiologist.  No orders to display       ------------------------- NURSING NOTES AND VITALS REVIEWED ---------------------------   The nursing notes within the ED encounter and vital signs as below have been reviewed.   BP (!) 154/108   Pulse 75   Temp 98.4 °F (36.9 °C) (Temporal)   Resp 16   Ht 1.829 m (6')   Wt (!) 154.2 kg (340 lb)   LMP 12/14/2024 (Approximate)   SpO2 99%   BMI 46.11 kg/m²   Oxygen Saturation Interpretation: Normal      ---------------------------------------------------PHYSICAL EXAM--------------------------------------    Constitutional/General: Alert and oriented x3, well appearing, non toxic in NAD  Head: NC/AT  Eyes: PERRL, EOMI  Mouth: Oropharynx clear, handling secretions, no trismus  Neck: Supple, full ROM, no meningeal signs  Pulmonary: Lungs clear to auscultation bilaterally, no wheezes, rales, or rhonchi. Not in respiratory distress  Cardiovascular:  Regular rate and rhythm, no murmurs, gallops, or rubs. 2+ distal pulses  Abdomen: Soft, non tender, non distended,   Extremities: Moves all extremities x 4. Warm and well perfused  Skin: warm and dry without rash  Neurologic: GCS 15,  Psych: Normal Affect      ------------------------------ ED COURSE/MEDICAL DECISION MAKING----------------------  Medications - No data to display      Medical Decision Making:    Simple    Counseling:   The emergency provider has spoken with the patient and discussed today’s results, in addition to providing specific details for the plan of care and counseling regarding the diagnosis and prognosis.  Questions are answered at this time and they are agreeable with the plan.      --------------------------------- IMPRESSION AND DISPOSITION ---------------------------------    IMPRESSION  1. COPD exacerbation (HCC)        DISPOSITION  Disposition: Discharge to

## 2025-01-14 DIAGNOSIS — E06.3 HYPOTHYROIDISM DUE TO HASHIMOTO'S THYROIDITIS: ICD-10-CM

## 2025-01-14 RX ORDER — LEVOTHYROXINE SODIUM 50 UG/1
50 TABLET ORAL DAILY
Qty: 30 TABLET | Refills: 0 | Status: SHIPPED | OUTPATIENT
Start: 2025-01-14

## 2025-01-14 NOTE — TELEPHONE ENCOUNTER
Name of Medication(s) Requested:  Requested Prescriptions     Pending Prescriptions Disp Refills    levothyroxine (SYNTHROID) 50 MCG tablet [Pharmacy Med Name: LEVOTHYROXINE 0.05MG (50MCG) TAB] 30 tablet 0     Sig: TAKE 1 TABLET BY MOUTH ONCE DAILY       Medication is on current medication list Yes    Dosage and directions were verified? Yes    Quantity verified: 30 day supply     Pharmacy Verified?  Yes    Last Appointment:  5/1/2024    Future appts:  Future Appointments   Date Time Provider Department Center   2/5/2025  3:45 PM Lindsey Cavanaugh APRN - CNP [facility] DEP        (If no appt send self scheduling link. .REFILLAPPT)  Scheduling request sent?     [x] Yes  [] No    Does patient need updated?  [] Yes  [x] No

## 2025-02-11 SDOH — ECONOMIC STABILITY: FOOD INSECURITY: WITHIN THE PAST 12 MONTHS, YOU WORRIED THAT YOUR FOOD WOULD RUN OUT BEFORE YOU GOT MONEY TO BUY MORE.: SOMETIMES TRUE

## 2025-02-11 SDOH — ECONOMIC STABILITY: INCOME INSECURITY: IN THE LAST 12 MONTHS, WAS THERE A TIME WHEN YOU WERE NOT ABLE TO PAY THE MORTGAGE OR RENT ON TIME?: YES

## 2025-02-11 SDOH — ECONOMIC STABILITY: FOOD INSECURITY: WITHIN THE PAST 12 MONTHS, THE FOOD YOU BOUGHT JUST DIDN'T LAST AND YOU DIDN'T HAVE MONEY TO GET MORE.: SOMETIMES TRUE

## 2025-02-11 SDOH — ECONOMIC STABILITY: TRANSPORTATION INSECURITY
IN THE PAST 12 MONTHS, HAS LACK OF TRANSPORTATION KEPT YOU FROM MEETINGS, WORK, OR FROM GETTING THINGS NEEDED FOR DAILY LIVING?: NO

## 2025-02-11 SDOH — ECONOMIC STABILITY: TRANSPORTATION INSECURITY
IN THE PAST 12 MONTHS, HAS THE LACK OF TRANSPORTATION KEPT YOU FROM MEDICAL APPOINTMENTS OR FROM GETTING MEDICATIONS?: NO

## 2025-02-11 ASSESSMENT — PATIENT HEALTH QUESTIONNAIRE - PHQ9
SUM OF ALL RESPONSES TO PHQ QUESTIONS 1-9: 7
8. MOVING OR SPEAKING SO SLOWLY THAT OTHER PEOPLE COULD HAVE NOTICED. OR THE OPPOSITE - BEING SO FIDGETY OR RESTLESS THAT YOU HAVE BEEN MOVING AROUND A LOT MORE THAN USUAL: SEVERAL DAYS
SUM OF ALL RESPONSES TO PHQ9 QUESTIONS 1 & 2: 2
9. THOUGHTS THAT YOU WOULD BE BETTER OFF DEAD, OR OF HURTING YOURSELF: NOT AT ALL
10. IF YOU CHECKED OFF ANY PROBLEMS, HOW DIFFICULT HAVE THESE PROBLEMS MADE IT FOR YOU TO DO YOUR WORK, TAKE CARE OF THINGS AT HOME, OR GET ALONG WITH OTHER PEOPLE: SOMEWHAT DIFFICULT
6. FEELING BAD ABOUT YOURSELF - OR THAT YOU ARE A FAILURE OR HAVE LET YOURSELF OR YOUR FAMILY DOWN: SEVERAL DAYS
SUM OF ALL RESPONSES TO PHQ QUESTIONS 1-9: 7
8. MOVING OR SPEAKING SO SLOWLY THAT OTHER PEOPLE COULD HAVE NOTICED. OR THE OPPOSITE, BEING SO FIGETY OR RESTLESS THAT YOU HAVE BEEN MOVING AROUND A LOT MORE THAN USUAL: SEVERAL DAYS
SUM OF ALL RESPONSES TO PHQ QUESTIONS 1-9: 7
4. FEELING TIRED OR HAVING LITTLE ENERGY: SEVERAL DAYS
2. FEELING DOWN, DEPRESSED OR HOPELESS: SEVERAL DAYS
1. LITTLE INTEREST OR PLEASURE IN DOING THINGS: SEVERAL DAYS
7. TROUBLE CONCENTRATING ON THINGS, SUCH AS READING THE NEWSPAPER OR WATCHING TELEVISION: SEVERAL DAYS
SUM OF ALL RESPONSES TO PHQ QUESTIONS 1-9: 7
6. FEELING BAD ABOUT YOURSELF - OR THAT YOU ARE A FAILURE OR HAVE LET YOURSELF OR YOUR FAMILY DOWN: SEVERAL DAYS
2. FEELING DOWN, DEPRESSED OR HOPELESS: SEVERAL DAYS
SUM OF ALL RESPONSES TO PHQ QUESTIONS 1-9: 7
3. TROUBLE FALLING OR STAYING ASLEEP: SEVERAL DAYS
3. TROUBLE FALLING OR STAYING ASLEEP: SEVERAL DAYS
5. POOR APPETITE OR OVEREATING: NOT AT ALL
7. TROUBLE CONCENTRATING ON THINGS, SUCH AS READING THE NEWSPAPER OR WATCHING TELEVISION: SEVERAL DAYS
9. THOUGHTS THAT YOU WOULD BE BETTER OFF DEAD, OR OF HURTING YOURSELF: NOT AT ALL
5. POOR APPETITE OR OVEREATING: NOT AT ALL
4. FEELING TIRED OR HAVING LITTLE ENERGY: SEVERAL DAYS
1. LITTLE INTEREST OR PLEASURE IN DOING THINGS: SEVERAL DAYS
10. IF YOU CHECKED OFF ANY PROBLEMS, HOW DIFFICULT HAVE THESE PROBLEMS MADE IT FOR YOU TO DO YOUR WORK, TAKE CARE OF THINGS AT HOME, OR GET ALONG WITH OTHER PEOPLE: SOMEWHAT DIFFICULT

## 2025-02-12 ENCOUNTER — OFFICE VISIT (OUTPATIENT)
Dept: FAMILY MEDICINE CLINIC | Age: 45
End: 2025-02-12
Payer: COMMERCIAL

## 2025-02-12 VITALS
DIASTOLIC BLOOD PRESSURE: 94 MMHG | BODY MASS INDEX: 39.68 KG/M2 | WEIGHT: 293 LBS | RESPIRATION RATE: 16 BRPM | HEART RATE: 81 BPM | SYSTOLIC BLOOD PRESSURE: 132 MMHG | HEIGHT: 72 IN | TEMPERATURE: 97.1 F | OXYGEN SATURATION: 92 %

## 2025-02-12 DIAGNOSIS — I10 ESSENTIAL HYPERTENSION, BENIGN: ICD-10-CM

## 2025-02-12 DIAGNOSIS — E06.3 HYPOTHYROIDISM DUE TO HASHIMOTO'S THYROIDITIS: ICD-10-CM

## 2025-02-12 LAB — TSH SERPL DL<=0.05 MIU/L-ACNC: 3.23 UIU/ML (ref 0.27–4.2)

## 2025-02-12 PROCEDURE — 36415 COLL VENOUS BLD VENIPUNCTURE: CPT | Performed by: NURSE PRACTITIONER

## 2025-02-12 PROCEDURE — G8427 DOCREV CUR MEDS BY ELIG CLIN: HCPCS | Performed by: NURSE PRACTITIONER

## 2025-02-12 PROCEDURE — 4004F PT TOBACCO SCREEN RCVD TLK: CPT | Performed by: NURSE PRACTITIONER

## 2025-02-12 PROCEDURE — 3075F SYST BP GE 130 - 139MM HG: CPT | Performed by: NURSE PRACTITIONER

## 2025-02-12 PROCEDURE — 99214 OFFICE O/P EST MOD 30 MIN: CPT | Performed by: NURSE PRACTITIONER

## 2025-02-12 PROCEDURE — G8417 CALC BMI ABV UP PARAM F/U: HCPCS | Performed by: NURSE PRACTITIONER

## 2025-02-12 PROCEDURE — 3080F DIAST BP >= 90 MM HG: CPT | Performed by: NURSE PRACTITIONER

## 2025-02-12 RX ORDER — LABETALOL 100 MG/1
TABLET, FILM COATED ORAL
Qty: 90 TABLET | Refills: 3 | Status: SHIPPED | OUTPATIENT
Start: 2025-02-12

## 2025-02-12 RX ORDER — LEVOTHYROXINE SODIUM 50 UG/1
50 TABLET ORAL DAILY
Qty: 30 TABLET | Refills: 0 | Status: CANCELLED | OUTPATIENT
Start: 2025-02-12

## 2025-02-12 NOTE — PROGRESS NOTES
Jimi Schaefer (:  1980) is a 44 y.o. female,Established patient, here for evaluation of the following chief complaint(s):  Hypothyroidism (Med Refill), Anxiety (Still having a lot of anxiety does not want to take buspar again, feels like it made her irritable. Doesn't feel the prozac is doing enough.), and Hypertension (BP has been running high)      Assessment & Plan   ASSESSMENT/PLAN:  Assessment & Plan  1. Anxiety.  She reports feeling calmer after discontinuing BuSpar but still experiences some anxiety. A referral to a mental health specialist will be made for further evaluation and potential addition of newer medications to her current Prozac regimen. If her anxiety persists despite improved blood pressure control, she is advised to contact the office for further assistance.    2. Hypertension.  Her blood pressure readings have consistently been elevated, with a recent reading of 132/90. The dosage of labetalol will be adjusted to 200 mg in the morning and 100 mg at night. She is advised to monitor her blood pressure twice daily and report any symptoms such as fatigue, lightheadedness, or dizziness. If her blood pressure remains high, the dosage may be increased to 200 mg twice daily.    3. Hypothyroidism.  A thyroid function test will be conducted today. The results will determine if any adjustments to her current levothyroxine dosage are necessary. A prescription for levothyroxine will be sent to [facility], on Friday morning.       1. Hypothyroidism due to Hashimoto's thyroiditis  -     TSH  2. Essential hypertension, benign  -     labetalol (NORMODYNE) 100 MG tablet; Take 200 mg am and 100 mg pm, Disp-90 tablet, R-3Normal      No follow-ups on file.         Subjective   SUBJECTIVE/OBJECTIVE:  History of Present Illness  The patient presents for evaluation of anxiety, hypertension, and hypothyroidism.    She has been off BuSpar for a brief period, during which she experienced

## 2025-02-14 DIAGNOSIS — E06.3 HYPOTHYROIDISM DUE TO HASHIMOTO'S THYROIDITIS: ICD-10-CM

## 2025-02-14 RX ORDER — LEVOTHYROXINE SODIUM 50 UG/1
50 TABLET ORAL DAILY
Qty: 30 TABLET | Refills: 0 | Status: SHIPPED | OUTPATIENT
Start: 2025-02-14

## 2025-03-14 DIAGNOSIS — F32.A DEPRESSION, UNSPECIFIED DEPRESSION TYPE: ICD-10-CM

## 2025-03-14 RX ORDER — FLUOXETINE HYDROCHLORIDE 40 MG/1
40 CAPSULE ORAL DAILY
Qty: 90 CAPSULE | Refills: 1 | Status: SHIPPED | OUTPATIENT
Start: 2025-03-14

## 2025-03-14 NOTE — TELEPHONE ENCOUNTER
Name of Medication(s) Requested:  Requested Prescriptions     Pending Prescriptions Disp Refills    FLUoxetine (PROZAC) 40 MG capsule [Pharmacy Med Name: FLUOXETINE 40MG CAPSULES] 90 capsule 1     Sig: TAKE 1 CAPSULE BY MOUTH DAILY       Medication is on current medication list Yes    Dosage and directions were verified? Yes    Quantity verified: 90 day supply     Pharmacy Verified?  Yes    Last Appointment:  2/12/2025    Future appts:  Future Appointments   Date Time Provider Department Center   8/13/2025  3:30 PM Lindsey Cavanaugh, APRN - CNP [facility] DEP        (If no appt send self scheduling link. .REFILLAPPT)  Scheduling request sent?     [] Yes  [x] No    Does patient need updated?  [] Yes  [x] No

## 2025-03-24 DIAGNOSIS — E06.3 HYPOTHYROIDISM DUE TO HASHIMOTO'S THYROIDITIS: ICD-10-CM

## 2025-03-24 RX ORDER — LEVOTHYROXINE SODIUM 50 UG/1
50 TABLET ORAL DAILY
Qty: 90 TABLET | Refills: 0 | Status: SHIPPED | OUTPATIENT
Start: 2025-03-24

## 2025-03-24 NOTE — TELEPHONE ENCOUNTER
Name of Medication(s) Requested:  Requested Prescriptions     Pending Prescriptions Disp Refills    levothyroxine (SYNTHROID) 50 MCG tablet [Pharmacy Med Name: LEVOTHYROXINE 0.05MG (50MCG) TAB] 90 tablet 0     Sig: TAKE 1 TABLET BY MOUTH DAILY       Medication is on current medication list Yes    Dosage and directions were verified? Yes    Quantity verified: 90 day supply     Pharmacy Verified?  Yes    Last Appointment:  2/12/2025    Future appts:  Future Appointments   Date Time Provider Department Center   8/13/2025  3:30 PM Lindsey Cavanaugh APRN - CNP [facility] DEP        (If no appt send self scheduling link. .REFILLAPPT)  Scheduling request sent?     [] Yes  [x] No    Does patient need updated?  [] Yes  [x] No

## 2025-04-08 ENCOUNTER — TELEMEDICINE (OUTPATIENT)
Dept: FAMILY MEDICINE CLINIC | Age: 45
End: 2025-04-08
Payer: COMMERCIAL

## 2025-04-08 DIAGNOSIS — F32.A DEPRESSION, UNSPECIFIED DEPRESSION TYPE: ICD-10-CM

## 2025-04-08 DIAGNOSIS — F41.9 ANXIETY: Primary | ICD-10-CM

## 2025-04-08 PROCEDURE — 99213 OFFICE O/P EST LOW 20 MIN: CPT | Performed by: NURSE PRACTITIONER

## 2025-04-08 PROCEDURE — G8427 DOCREV CUR MEDS BY ELIG CLIN: HCPCS | Performed by: NURSE PRACTITIONER

## 2025-04-08 RX ORDER — BUSPIRONE HYDROCHLORIDE 15 MG/1
15 TABLET ORAL 2 TIMES DAILY PRN
Qty: 20 TABLET | Refills: 0 | Status: SHIPPED | OUTPATIENT
Start: 2025-04-08 | End: 2025-04-18

## 2025-04-08 ASSESSMENT — PATIENT HEALTH QUESTIONNAIRE - PHQ9
SUM OF ALL RESPONSES TO PHQ QUESTIONS 1-9: 16
1. LITTLE INTEREST OR PLEASURE IN DOING THINGS: NEARLY EVERY DAY
SUM OF ALL RESPONSES TO PHQ QUESTIONS 1-9: 16
3. TROUBLE FALLING OR STAYING ASLEEP: SEVERAL DAYS
SUM OF ALL RESPONSES TO PHQ QUESTIONS 1-9: 16
9. THOUGHTS THAT YOU WOULD BE BETTER OFF DEAD, OR OF HURTING YOURSELF: NOT AT ALL
8. MOVING OR SPEAKING SO SLOWLY THAT OTHER PEOPLE COULD HAVE NOTICED. OR THE OPPOSITE, BEING SO FIGETY OR RESTLESS THAT YOU HAVE BEEN MOVING AROUND A LOT MORE THAN USUAL: SEVERAL DAYS
5. POOR APPETITE OR OVEREATING: MORE THAN HALF THE DAYS
SUM OF ALL RESPONSES TO PHQ QUESTIONS 1-9: 16
2. FEELING DOWN, DEPRESSED OR HOPELESS: MORE THAN HALF THE DAYS
7. TROUBLE CONCENTRATING ON THINGS, SUCH AS READING THE NEWSPAPER OR WATCHING TELEVISION: NEARLY EVERY DAY
10. IF YOU CHECKED OFF ANY PROBLEMS, HOW DIFFICULT HAVE THESE PROBLEMS MADE IT FOR YOU TO DO YOUR WORK, TAKE CARE OF THINGS AT HOME, OR GET ALONG WITH OTHER PEOPLE: EXTREMELY DIFFICULT
4. FEELING TIRED OR HAVING LITTLE ENERGY: MORE THAN HALF THE DAYS
6. FEELING BAD ABOUT YOURSELF - OR THAT YOU ARE A FAILURE OR HAVE LET YOURSELF OR YOUR FAMILY DOWN: MORE THAN HALF THE DAYS

## 2025-04-08 ASSESSMENT — ENCOUNTER SYMPTOMS
DIARRHEA: 0
CONSTIPATION: 0
COUGH: 0
WHEEZING: 0
VOMITING: 0
SHORTNESS OF BREATH: 0
NAUSEA: 1

## 2025-04-08 NOTE — PROGRESS NOTES
(with anxiety). Negative for chest pain.   Gastrointestinal:  Positive for nausea. Negative for constipation, diarrhea and vomiting.   Neurological:  Positive for syncope (last week-has had in past) and headaches. Negative for weakness and light-headedness.   Psychiatric/Behavioral:  Positive for decreased concentration, dysphoric mood and sleep disturbance. Negative for suicidal ideas. The patient is nervous/anxious.           Objective   Patient-Reported Vitals  Patient-Reported Weight: 360  Patient-Reported Height: 6'       Physical Exam      Constitutional: [x] Appears well-developed and well-nourished [x] No apparent distress      [] Abnormal -     Mental status: [x] Alert and awake  [x] Oriented to person/place/time [x] Able to follow commands    [] Abnormal -     Eyes:   EOM    [x]  Normal    [] Abnormal -   Sclera  [x]  Normal    [] Abnormal -          Discharge []  None visible   [] Abnormal -     HENT: [x] Normocephalic, atraumatic  [] Abnormal -   [x] Mouth/Throat: Mucous membranes are moist    External Ears [x] Normal  [] Abnormal -    Neck: [x] No visualized mass [] Abnormal -     Pulmonary/Chest: [x] Respiratory effort normal   [x] No visualized signs of difficulty breathing or respiratory distress        [] Abnormal -     Neurological:        [x] No Facial Asymmetry (Cranial nerve 7 motor function) (limited exam due to video visit)          [x] No gaze palsy        [] Abnormal -          Skin:        [x] No significant exanthematous lesions or discoloration noted on facial skin         [] Abnormal -            Psychiatric:       [x] Normal Affect [] Abnormal -        [] No Hallucinations           MDM low    --Lindsey Cavanaugh, MARY - CNP

## 2025-04-22 ENCOUNTER — HOSPITAL ENCOUNTER (EMERGENCY)
Age: 45
Discharge: HOME | End: 2025-04-22
Attending: FAMILY MEDICINE
Payer: COMMERCIAL

## 2025-04-22 VITALS
HEART RATE: 80 BPM | RESPIRATION RATE: 16 BRPM | DIASTOLIC BLOOD PRESSURE: 98 MMHG | SYSTOLIC BLOOD PRESSURE: 140 MMHG | TEMPERATURE: 98.3 F | OXYGEN SATURATION: 98 %

## 2025-04-22 DIAGNOSIS — J20.9 ACUTE BRONCHITIS, UNSPECIFIED ORGANISM: Primary | ICD-10-CM

## 2025-04-22 PROCEDURE — 99283 EMERGENCY DEPT VISIT LOW MDM: CPT

## 2025-04-22 RX ORDER — DEXTROMETHORPHAN HYDROBROMIDE AND PROMETHAZINE HYDROCHLORIDE 15; 6.25 MG/5ML; MG/5ML
5 SYRUP ORAL 4 TIMES DAILY PRN
Qty: 118 ML | Refills: 0 | Status: SHIPPED | OUTPATIENT
Start: 2025-04-22 | End: 2025-04-29

## 2025-04-22 RX ORDER — GUAIFENESIN AND DEXTROMETHORPHAN HYDROBROMIDE 1200; 60 MG/1; MG/1
1 TABLET, EXTENDED RELEASE ORAL 2 TIMES DAILY
Qty: 28 TABLET | Refills: 0 | Status: SHIPPED | OUTPATIENT
Start: 2025-04-22

## 2025-04-22 RX ORDER — AZITHROMYCIN 250 MG/1
TABLET, FILM COATED ORAL
Qty: 1 PACKET | Refills: 0 | Status: SHIPPED | OUTPATIENT
Start: 2025-04-22 | End: 2025-04-26

## 2025-04-22 RX ORDER — ALBUTEROL SULFATE 90 UG/1
2 INHALANT RESPIRATORY (INHALATION) 4 TIMES DAILY PRN
Qty: 54 G | Refills: 1 | Status: SHIPPED | OUTPATIENT
Start: 2025-04-22

## 2025-04-22 RX ORDER — PREDNISONE 20 MG/1
40 TABLET ORAL DAILY
Qty: 8 TABLET | Refills: 0 | Status: SHIPPED | OUTPATIENT
Start: 2025-04-22 | End: 2025-04-26

## 2025-04-22 ASSESSMENT — PAIN - FUNCTIONAL ASSESSMENT: PAIN_FUNCTIONAL_ASSESSMENT: NONE - DENIES PAIN

## 2025-04-23 NOTE — ED PROVIDER NOTES
HPI:  27,   Time: 8:18 PM EDT         Jimi Schaefer is a 44 y.o. female presenting to the ED for 3-day history of nonproductive cough, chest tightness, body aches and fatigue on a background history of what she describes as being diagnosed with COPD.  She does smoke cigarettes starting at about age 18.  She denies nausea vomiting or diarrhea.  She denies fever or chills.    ROS:   Pertinent positives and negatives are stated within HPI, all other systems reviewed and are negative.  --------------------------------------------- PAST HISTORY ---------------------------------------------  Past Medical History:  has a past medical history of Abnormal Pap smear of cervix, Anxiety, CAD (coronary artery disease), Chronic back pain, COPD (chronic obstructive pulmonary disease) (HCC), Degenerative disc disease at L5-S1 level, Depression, Edema, Gestational diabetes mellitus (GDM) in third trimester controlled on oral hypoglycemic drug, Headache, Hypertension, Hypothyroidism, Restless legs syndrome, Rh incompatibility, Rh sensitized, Thyroid disease, Thyroid disease, and Urinary incontinence.    Past Surgical History:  has a past surgical history that includes back surgery; Vein Surgery (Bilateral); LEEP; Cholecystectomy; Spine surgery;  section; and total colectomy.    Social History:  reports that she has been smoking cigarettes. She has a 11 pack-year smoking history. She has never used smokeless tobacco. She reports that she does not currently use alcohol. She reports current drug use. Frequency: 7.00 times per week. Drug: Marijuana (Weed).    Family History: family history includes Anxiety Disorder in her mother; Cancer in her maternal aunt; Depression in her mother; Diabetes in her maternal grandmother; Early Death in her mother; Elevated Lipids in her mother; Heart Disease in her father and maternal grandmother; Hypertension in her mother; Kidney Disease in her maternal grandmother; Stroke in her

## 2025-06-30 DIAGNOSIS — E06.3 HYPOTHYROIDISM DUE TO HASHIMOTO'S THYROIDITIS: ICD-10-CM

## 2025-06-30 RX ORDER — LEVOTHYROXINE SODIUM 50 UG/1
50 TABLET ORAL DAILY
Qty: 90 TABLET | Refills: 0 | Status: SHIPPED | OUTPATIENT
Start: 2025-06-30

## 2025-06-30 NOTE — TELEPHONE ENCOUNTER
Name of Medication(s) Requested:  Requested Prescriptions     Pending Prescriptions Disp Refills    levothyroxine (SYNTHROID) 50 MCG tablet [Pharmacy Med Name: LEVOTHYROXINE 0.05MG (50MCG) TAB] 90 tablet 0     Sig: TAKE 1 TABLET BY MOUTH DAILY       Medication is on current medication list Yes    Dosage and directions were verified? Yes    Quantity verified: 90 day supply     Pharmacy Verified?  Yes    Last Appointment:  4/8/2025    Future appts:  Future Appointments   Date Time Provider Department Center   8/13/2025  3:30 PM Lindsey Cavanaugh APRN - CNP [facility] DEP        (If no appt send self scheduling link. .REFILLAPPT)  Scheduling request sent?     [] Yes  [] No    Does patient need updated?  [] Yes  [] No

## 2025-07-07 DIAGNOSIS — F32.A DEPRESSION, UNSPECIFIED DEPRESSION TYPE: ICD-10-CM

## 2025-07-07 RX ORDER — FLUOXETINE HYDROCHLORIDE 40 MG/1
40 CAPSULE ORAL DAILY
Qty: 30 CAPSULE | Refills: 1 | Status: SHIPPED | OUTPATIENT
Start: 2025-07-07

## 2025-07-26 DIAGNOSIS — I10 ESSENTIAL HYPERTENSION, BENIGN: ICD-10-CM

## 2025-07-28 RX ORDER — LABETALOL 100 MG/1
TABLET, FILM COATED ORAL
Qty: 270 TABLET | Refills: 0 | Status: SHIPPED | OUTPATIENT
Start: 2025-07-28

## 2025-07-28 NOTE — TELEPHONE ENCOUNTER
Name of Medication(s) Requested:  Requested Prescriptions     Pending Prescriptions Disp Refills    labetalol (NORMODYNE) 100 MG tablet [Pharmacy Med Name: LABETALOL 100MG TABLETS] 90 tablet 0     Sig: TAKE 2 TABLETS BY MOUTH IN THE MORNING AND 1 TABLET AT NIGHT       Medication is on current medication list Yes    Dosage and directions were verified? Yes    Quantity verified: 30 day supply     Pharmacy Verified?  Yes    Last Appointment:  4/8/2025    Future appts:  Future Appointments   Date Time Provider Department Center   8/13/2025  3:30 PM Lindsey Cavanaugh APRN - CNP [facility] DEP        (If no appt send self scheduling link. .REFILLAPPT)  Scheduling request sent?     [] Yes  [] No    Does patient need updated?  [] Yes  [] No

## 2025-08-08 ENCOUNTER — HOSPITAL ENCOUNTER (EMERGENCY)
Age: 45
Discharge: HOME | End: 2025-08-08
Attending: EMERGENCY MEDICINE
Payer: COMMERCIAL

## 2025-08-08 VITALS
HEART RATE: 90 BPM | SYSTOLIC BLOOD PRESSURE: 150 MMHG | OXYGEN SATURATION: 96 % | BODY MASS INDEX: 39.68 KG/M2 | HEIGHT: 72 IN | TEMPERATURE: 97 F | WEIGHT: 293 LBS | DIASTOLIC BLOOD PRESSURE: 90 MMHG | RESPIRATION RATE: 16 BRPM

## 2025-08-08 DIAGNOSIS — R51.9 ACUTE NONINTRACTABLE HEADACHE, UNSPECIFIED HEADACHE TYPE: Primary | ICD-10-CM

## 2025-08-08 PROCEDURE — 96372 THER/PROPH/DIAG INJ SC/IM: CPT

## 2025-08-08 PROCEDURE — 6360000002 HC RX W HCPCS: Performed by: EMERGENCY MEDICINE

## 2025-08-08 PROCEDURE — 99284 EMERGENCY DEPT VISIT MOD MDM: CPT

## 2025-08-08 RX ORDER — DEXAMETHASONE SODIUM PHOSPHATE 10 MG/ML
10 INJECTION, SOLUTION INTRAMUSCULAR; INTRAVENOUS ONCE
Status: COMPLETED | OUTPATIENT
Start: 2025-08-08 | End: 2025-08-08

## 2025-08-08 RX ORDER — BUSPIRONE HYDROCHLORIDE 15 MG/1
15 TABLET ORAL 4 TIMES DAILY
COMMUNITY

## 2025-08-08 RX ORDER — KETOROLAC TROMETHAMINE 30 MG/ML
30 INJECTION, SOLUTION INTRAMUSCULAR; INTRAVENOUS ONCE
Status: COMPLETED | OUTPATIENT
Start: 2025-08-08 | End: 2025-08-08

## 2025-08-08 RX ADMIN — KETOROLAC TROMETHAMINE 30 MG: 30 INJECTION, SOLUTION INTRAMUSCULAR at 19:09

## 2025-08-08 RX ADMIN — DEXAMETHASONE SODIUM PHOSPHATE 10 MG: 10 INJECTION, SOLUTION INTRAMUSCULAR; INTRAVENOUS at 19:11

## 2025-08-08 ASSESSMENT — PAIN DESCRIPTION - LOCATION: LOCATION: HEAD

## 2025-08-08 ASSESSMENT — PAIN DESCRIPTION - DESCRIPTORS: DESCRIPTORS: ACHING

## 2025-08-08 ASSESSMENT — PAIN - FUNCTIONAL ASSESSMENT: PAIN_FUNCTIONAL_ASSESSMENT: 0-10

## 2025-08-08 ASSESSMENT — PAIN SCALES - GENERAL: PAINLEVEL_OUTOF10: 8

## 2025-08-08 ASSESSMENT — PAIN DESCRIPTION - PAIN TYPE: TYPE: ACUTE PAIN

## 2025-08-08 ASSESSMENT — PAIN DESCRIPTION - FREQUENCY: FREQUENCY: CONTINUOUS

## 2025-09-03 ENCOUNTER — HOSPITAL ENCOUNTER (OUTPATIENT)
Age: 45
Discharge: HOME | End: 2025-09-03
Payer: COMMERCIAL

## 2025-09-03 PROCEDURE — 93005 ELECTROCARDIOGRAM TRACING: CPT | Performed by: FAMILY MEDICINE

## 2025-09-04 LAB
EKG ATRIAL RATE: 72 BPM
EKG P AXIS: 60 DEGREES
EKG P-R INTERVAL: 176 MS
EKG Q-T INTERVAL: 428 MS
EKG QRS DURATION: 120 MS
EKG QTC CALCULATION (BAZETT): 468 MS
EKG R AXIS: -47 DEGREES
EKG T AXIS: 35 DEGREES
EKG VENTRICULAR RATE: 72 BPM